# Patient Record
Sex: FEMALE | Race: WHITE | NOT HISPANIC OR LATINO | Employment: FULL TIME | URBAN - NONMETROPOLITAN AREA
[De-identification: names, ages, dates, MRNs, and addresses within clinical notes are randomized per-mention and may not be internally consistent; named-entity substitution may affect disease eponyms.]

---

## 2021-01-11 LAB — PAP RECOMMENDATION EXT: NORMAL

## 2022-08-22 LAB
ABO GROUP: NORMAL
ANTIBODY SCREEN: NEGATIVE
CHLAMYDIA: NEGATIVE
HCG SERPL-ACNC: NORMAL MIU/ML
N. GONORRHOEAE (GC) BY PCR: NEGATIVE
PROGEST SERPL-MCNC: 25.1 NG/ML
RH TYPE: NEGATIVE ML
TRICHOMONAS VAGINALIS: NEGATIVE

## 2022-09-21 LAB
ABO + RH BLD: NORMAL
EXT MATERNIT21: NORMAL
HBV SURFACE AG SERPL QL IA: NEGATIVE
HCT VFR BLD AUTO: 36.3 % (ref 36–46)
HCV AB SERPL QL IA: NEGATIVE
HGB BLD-MCNC: 12.3 G/DL (ref 12–16)
HIV 1+2 AB+HIV1 P24 AG SERPL QL IA: NONREACTIVE
INDIRECT COOMBS: NEGATIVE
MCV RBC AUTO: 88.8 FL (ref 82–108)
PLATELET # BLD AUTO: 230 K/UL (ref 150–399)
RPR: NONREACTIVE
RUBELLA IMMUNE STATUS: NORMAL

## 2022-11-23 LAB
FREE T4: 0.91
HCT VFR BLD AUTO: 35 % (ref 36–46)
HGB BLD-MCNC: 12.1 G/DL (ref 12–16)
MCV RBC AUTO: 88.6 FL (ref 82–108)
PLATELET # BLD AUTO: 230 K/UL (ref 150–399)
TSH SERPL DL<=0.005 MIU/L-ACNC: 0.66 UIU/ML (ref 0.41–5.9)

## 2023-01-04 VITALS — DIASTOLIC BLOOD PRESSURE: 64 MMHG | WEIGHT: 145.5 LBS | SYSTOLIC BLOOD PRESSURE: 118 MMHG

## 2023-01-04 RX ORDER — CITALOPRAM 20 MG/1
20 TABLET, FILM COATED ORAL DAILY
COMMUNITY
End: 2023-08-08 | Stop reason: SDUPTHER

## 2023-01-04 RX ORDER — DOCUSATE SODIUM 100 MG/1
100 CAPSULE, LIQUID FILLED ORAL 2 TIMES DAILY
COMMUNITY
End: 2023-06-06

## 2023-01-04 RX ORDER — ONDANSETRON 4 MG/1
8 TABLET, FILM COATED ORAL EVERY 6 HOURS PRN
COMMUNITY

## 2023-01-04 RX ORDER — CETIRIZINE HYDROCHLORIDE 10 MG/1
10 TABLET ORAL DAILY
COMMUNITY

## 2023-01-04 RX ORDER — HYDROXYZINE PAMOATE 50 MG/1
50 CAPSULE ORAL NIGHTLY PRN
Status: ON HOLD | COMMUNITY
End: 2023-09-21 | Stop reason: SDUPTHER

## 2023-01-04 RX ORDER — BUSPIRONE HYDROCHLORIDE 10 MG/1
10 TABLET ORAL 3 TIMES DAILY PRN
COMMUNITY
End: 2024-02-20 | Stop reason: SDUPTHER

## 2023-01-27 ENCOUNTER — LAB VISIT (OUTPATIENT)
Dept: LAB | Facility: HOSPITAL | Age: 33
End: 2023-01-27
Attending: OBSTETRICS & GYNECOLOGY
Payer: COMMERCIAL

## 2023-01-27 ENCOUNTER — INFUSION (OUTPATIENT)
Dept: INFUSION THERAPY | Facility: HOSPITAL | Age: 33
End: 2023-01-27
Attending: OBSTETRICS & GYNECOLOGY
Payer: COMMERCIAL

## 2023-01-27 DIAGNOSIS — Z3A.28 28 WEEKS GESTATION OF PREGNANCY: Primary | ICD-10-CM

## 2023-01-27 DIAGNOSIS — Z34.90 PREGNANCY, UNSPECIFIED GESTATIONAL AGE: Primary | ICD-10-CM

## 2023-01-27 DIAGNOSIS — Z34.90 PREGNANCY, UNSPECIFIED GESTATIONAL AGE: ICD-10-CM

## 2023-01-27 LAB
ABO AND RH: NORMAL
ANTIBODY SCREEN: NORMAL
BASOPHILS # BLD AUTO: 0.03 X10(3)/MCL (ref 0.01–0.08)
BASOPHILS NFR BLD AUTO: 0.2 % (ref 0.1–1.2)
EOSINOPHIL # BLD AUTO: 0.11 X10(3)/MCL (ref 0.04–0.36)
EOSINOPHIL NFR BLD AUTO: 0.9 % (ref 0.7–7)
ERYTHROCYTE [DISTWIDTH] IN BLOOD BY AUTOMATED COUNT: 12.5 % (ref 11–14.5)
HCT VFR BLD AUTO: 38.8 % (ref 36–48)
HGB BLD-MCNC: 13.2 GM/DL (ref 11.8–16)
IMM GRANULOCYTES # BLD AUTO: 0.1 X10(3)/MCL (ref 0–0.03)
IMM GRANULOCYTES NFR BLD AUTO: 0.8 % (ref 0–0.5)
LYMPHOCYTES # BLD AUTO: 2.06 X10(3)/MCL (ref 1.16–3.74)
LYMPHOCYTES NFR BLD AUTO: 16.7 % (ref 20–55)
MCH RBC QN AUTO: 29.7 PG (ref 27–34)
MCV RBC AUTO: 87.4 FL (ref 79–99)
MEAN CELL HEMOGLOBIN CONCENTRATION (OHS) G/DL: 34 G/DL (ref 31–37)
MONOCYTES # BLD AUTO: 1 X10(3)/MCL (ref 0.24–0.36)
MONOCYTES NFR BLD AUTO: 8.1 % (ref 4.7–12.5)
NEUTROPHILS # BLD AUTO: 9.06 X10(3)/MCL (ref 1.56–6.13)
NEUTROPHILS NFR BLD AUTO: 73.3 % (ref 37–73)
NRBC BLD AUTO-RTO: 0 % (ref 0–1)
PLATELET # BLD AUTO: 209 X10(3)/MCL (ref 140–371)
PMV BLD AUTO: 10.9 FL (ref 9.4–12.4)
RBC # BLD AUTO: 4.44 X10(6)/MCL (ref 4–5.1)
RH IMMUNE GLOBULIN: NORMAL
RH IMMUNE GLOBULIN: NORMAL
WBC # SPEC AUTO: 12.4 X10(3)/MCL (ref 4–11.5)

## 2023-01-27 PROCEDURE — 85025 COMPLETE CBC W/AUTO DIFF WBC: CPT

## 2023-01-27 PROCEDURE — 36415 COLL VENOUS BLD VENIPUNCTURE: CPT

## 2023-01-27 PROCEDURE — 96372 THER/PROPH/DIAG INJ SC/IM: CPT

## 2023-01-27 PROCEDURE — 63600519 RHOGAM PHARM REV CODE 636 ALT 250 W HCPCS: Performed by: OBSTETRICS & GYNECOLOGY

## 2023-01-27 PROCEDURE — 86900 BLOOD TYPING SEROLOGIC ABO: CPT | Performed by: OBSTETRICS & GYNECOLOGY

## 2023-01-27 RX ADMIN — HUMAN RHO(D) IMMUNE GLOBULIN 300 MCG: 50 INJECTION, SOLUTION INTRAMUSCULAR at 04:01

## 2023-01-27 NOTE — PROGRESS NOTES
PT RECEIVED RHOGAM INJECTION IN LEFT DELTOID, PT TOLERATED WELL, INSTRUCTED PATIENT TO CALL MD IF NEEDED

## 2023-02-06 ENCOUNTER — DOCUMENTATION ONLY (OUTPATIENT)
Dept: ADMINISTRATIVE | Facility: HOSPITAL | Age: 33
End: 2023-02-06
Payer: COMMERCIAL

## 2023-02-28 ENCOUNTER — TELEPHONE (OUTPATIENT)
Dept: OBSTETRICS AND GYNECOLOGY | Facility: CLINIC | Age: 33
End: 2023-02-28
Payer: COMMERCIAL

## 2023-02-28 DIAGNOSIS — O35.12X0: Primary | ICD-10-CM

## 2023-02-28 DIAGNOSIS — O26.893 RH NEGATIVE STATE IN ANTEPARTUM PERIOD, THIRD TRIMESTER: ICD-10-CM

## 2023-02-28 DIAGNOSIS — Z67.91 RH NEGATIVE STATE IN ANTEPARTUM PERIOD, THIRD TRIMESTER: ICD-10-CM

## 2023-02-28 DIAGNOSIS — O36.4XX1 FETAL DEMISE, GREATER THAN 22 WEEKS, ANTEPARTUM, FETUS 1: ICD-10-CM

## 2023-02-28 RX ORDER — ONDANSETRON 8 MG/1
8 TABLET, ORALLY DISINTEGRATING ORAL EVERY 6 HOURS PRN
Status: CANCELLED | OUTPATIENT
Start: 2023-03-01

## 2023-02-28 RX ORDER — HYDROMORPHONE HYDROCHLORIDE 2 MG/ML
1 INJECTION, SOLUTION INTRAMUSCULAR; INTRAVENOUS; SUBCUTANEOUS EVERY 6 HOURS PRN
Status: CANCELLED | OUTPATIENT
Start: 2023-03-01

## 2023-02-28 RX ORDER — ACETAMINOPHEN 500 MG
500 TABLET ORAL EVERY 4 HOURS PRN
Status: CANCELLED | OUTPATIENT
Start: 2023-03-01

## 2023-02-28 RX ORDER — MISOPROSTOL 100 MCG
50 TABLET ORAL EVERY 4 HOURS PRN
Status: CANCELLED | OUTPATIENT
Start: 2023-03-01

## 2023-02-28 NOTE — TELEPHONE ENCOUNTER
Returned patient call . Patient currently at [facility] on labor and delivery unit for assessment . Dr. Gibson notified----- Message from Lacy Lejeune, MA sent at 2/28/2023  8:17 AM CST -----  PT IS CURRENTLY PREGNANT AND HAS A NEGATIVE BLOOD TYPE, SHE IS BLEEDING AND WOULD LIKE A NURSE TO CALL HER BACK,

## 2023-03-01 ENCOUNTER — HOSPITAL ENCOUNTER (INPATIENT)
Facility: HOSPITAL | Age: 33
LOS: 1 days | Discharge: HOME | End: 2023-03-02
Attending: OBSTETRICS & GYNECOLOGY | Admitting: OBSTETRICS & GYNECOLOGY
Payer: COMMERCIAL

## 2023-03-01 DIAGNOSIS — O36.4XX1 FETAL DEMISE, GREATER THAN 22 WEEKS, ANTEPARTUM, FETUS 1: ICD-10-CM

## 2023-03-01 DIAGNOSIS — O35.12X0: ICD-10-CM

## 2023-03-01 PROCEDURE — 63600175 PHARM REV CODE 636 W HCPCS: Performed by: OBSTETRICS & GYNECOLOGY

## 2023-03-01 PROCEDURE — 59400 PR FULL ROUT OBSTE CARE,VAGINAL DELIV: ICD-10-PCS | Mod: AT,,, | Performed by: OBSTETRICS & GYNECOLOGY

## 2023-03-01 PROCEDURE — 72200005 HC VAGINAL DELIVERY LEVEL II

## 2023-03-01 PROCEDURE — 36415 COLL VENOUS BLD VENIPUNCTURE: CPT | Performed by: OBSTETRICS & GYNECOLOGY

## 2023-03-01 PROCEDURE — 85461 HEMOGLOBIN FETAL: CPT | Performed by: OBSTETRICS & GYNECOLOGY

## 2023-03-01 PROCEDURE — 25000003 PHARM REV CODE 250: Performed by: OBSTETRICS & GYNECOLOGY

## 2023-03-01 PROCEDURE — 59400 OBSTETRICAL CARE: CPT | Mod: AT,,, | Performed by: OBSTETRICS & GYNECOLOGY

## 2023-03-01 PROCEDURE — 11000001 HC ACUTE MED/SURG PRIVATE ROOM

## 2023-03-01 RX ORDER — OXYTOCIN/RINGER'S LACTATE 30/500 ML
334 PLASTIC BAG, INJECTION (ML) INTRAVENOUS ONCE AS NEEDED
Status: COMPLETED | OUTPATIENT
Start: 2023-03-01 | End: 2023-03-01

## 2023-03-01 RX ORDER — MISOPROSTOL 100 UG/1
800 TABLET ORAL ONCE AS NEEDED
Status: DISCONTINUED | OUTPATIENT
Start: 2023-03-01 | End: 2023-03-02 | Stop reason: HOSPADM

## 2023-03-01 RX ORDER — OXYTOCIN/RINGER'S LACTATE 30/500 ML
PLASTIC BAG, INJECTION (ML) INTRAVENOUS
Status: COMPLETED
Start: 2023-03-01 | End: 2023-03-01

## 2023-03-01 RX ORDER — METHYLERGONOVINE MALEATE 0.2 MG/ML
200 INJECTION INTRAVENOUS
Status: DISCONTINUED | OUTPATIENT
Start: 2023-03-01 | End: 2023-03-02 | Stop reason: HOSPADM

## 2023-03-01 RX ORDER — SODIUM CHLORIDE 0.9 % (FLUSH) 0.9 %
10 SYRINGE (ML) INJECTION
Status: DISCONTINUED | OUTPATIENT
Start: 2023-03-01 | End: 2023-03-02 | Stop reason: HOSPADM

## 2023-03-01 RX ORDER — ACETAMINOPHEN 500 MG
500 TABLET ORAL EVERY 4 HOURS PRN
Status: DISCONTINUED | OUTPATIENT
Start: 2023-03-01 | End: 2023-03-02 | Stop reason: HOSPADM

## 2023-03-01 RX ORDER — MISOPROSTOL 100 MCG
50 TABLET ORAL EVERY 4 HOURS PRN
Status: DISCONTINUED | OUTPATIENT
Start: 2023-03-01 | End: 2023-03-01

## 2023-03-01 RX ORDER — OXYTOCIN 10 [USP'U]/ML
10 INJECTION, SOLUTION INTRAMUSCULAR; INTRAVENOUS ONCE AS NEEDED
Status: DISCONTINUED | OUTPATIENT
Start: 2023-03-01 | End: 2023-03-02 | Stop reason: HOSPADM

## 2023-03-01 RX ORDER — HYDROMORPHONE HYDROCHLORIDE 1 MG/ML
1 INJECTION, SOLUTION INTRAMUSCULAR; INTRAVENOUS; SUBCUTANEOUS EVERY 4 HOURS PRN
Status: DISCONTINUED | OUTPATIENT
Start: 2023-03-01 | End: 2023-03-02 | Stop reason: HOSPADM

## 2023-03-01 RX ORDER — TRANEXAMIC ACID 10 MG/ML
1000 INJECTION, SOLUTION INTRAVENOUS ONCE AS NEEDED
Status: DISCONTINUED | OUTPATIENT
Start: 2023-03-01 | End: 2023-03-02 | Stop reason: HOSPADM

## 2023-03-01 RX ORDER — CARBOPROST TROMETHAMINE 250 UG/ML
250 INJECTION, SOLUTION INTRAMUSCULAR
Status: DISCONTINUED | OUTPATIENT
Start: 2023-03-01 | End: 2023-03-02 | Stop reason: HOSPADM

## 2023-03-01 RX ORDER — PROCHLORPERAZINE EDISYLATE 5 MG/ML
5 INJECTION INTRAMUSCULAR; INTRAVENOUS EVERY 6 HOURS PRN
Status: DISCONTINUED | OUTPATIENT
Start: 2023-03-01 | End: 2023-03-02 | Stop reason: HOSPADM

## 2023-03-01 RX ORDER — ONDANSETRON 4 MG/1
8 TABLET, ORALLY DISINTEGRATING ORAL EVERY 8 HOURS PRN
Status: DISCONTINUED | OUTPATIENT
Start: 2023-03-01 | End: 2023-03-02 | Stop reason: HOSPADM

## 2023-03-01 RX ORDER — ONDANSETRON 4 MG/1
8 TABLET, ORALLY DISINTEGRATING ORAL EVERY 6 HOURS PRN
Status: DISCONTINUED | OUTPATIENT
Start: 2023-03-01 | End: 2023-03-02 | Stop reason: HOSPADM

## 2023-03-01 RX ORDER — OXYTOCIN/RINGER'S LACTATE 30/500 ML
95 PLASTIC BAG, INJECTION (ML) INTRAVENOUS ONCE AS NEEDED
Status: DISCONTINUED | OUTPATIENT
Start: 2023-03-01 | End: 2023-03-02 | Stop reason: HOSPADM

## 2023-03-01 RX ORDER — MISOPROSTOL 100 UG/1
100 TABLET ORAL EVERY 4 HOURS PRN
Status: DISCONTINUED | OUTPATIENT
Start: 2023-03-01 | End: 2023-03-02 | Stop reason: HOSPADM

## 2023-03-01 RX ORDER — HYDROMORPHONE HYDROCHLORIDE 1 MG/ML
1 INJECTION, SOLUTION INTRAMUSCULAR; INTRAVENOUS; SUBCUTANEOUS EVERY 6 HOURS PRN
Status: DISCONTINUED | OUTPATIENT
Start: 2023-03-01 | End: 2023-03-01

## 2023-03-01 RX ADMIN — MISOPROSTOL 50 MCG: 100 TABLET ORAL at 06:03

## 2023-03-01 RX ADMIN — MISOPROSTOL 100 MCG: 100 TABLET ORAL at 07:03

## 2023-03-01 RX ADMIN — Medication 334 MILLI-UNITS/MIN: at 10:03

## 2023-03-01 RX ADMIN — ACETAMINOPHEN 500 MG: 500 TABLET, FILM COATED ORAL at 09:03

## 2023-03-01 RX ADMIN — HYDROMORPHONE HYDROCHLORIDE 1 MG: 1 INJECTION, SOLUTION INTRAMUSCULAR; INTRAVENOUS; SUBCUTANEOUS at 05:03

## 2023-03-01 RX ADMIN — MISOPROSTOL 50 MCG: 100 TABLET ORAL at 10:03

## 2023-03-01 RX ADMIN — MISOPROSTOL 50 MCG: 100 TABLET ORAL at 02:03

## 2023-03-01 RX ADMIN — HYDROMORPHONE HYDROCHLORIDE 1 MG: 1 INJECTION, SOLUTION INTRAMUSCULAR; INTRAVENOUS; SUBCUTANEOUS at 09:03

## 2023-03-01 RX ADMIN — ACETAMINOPHEN 500 MG: 500 TABLET, FILM COATED ORAL at 02:03

## 2023-03-02 VITALS
TEMPERATURE: 98 F | DIASTOLIC BLOOD PRESSURE: 84 MMHG | SYSTOLIC BLOOD PRESSURE: 134 MMHG | RESPIRATION RATE: 16 BRPM | BODY MASS INDEX: 24.84 KG/M2 | HEIGHT: 64 IN | HEART RATE: 62 BPM | OXYGEN SATURATION: 99 % | WEIGHT: 145.5 LBS

## 2023-03-02 LAB
BASOPHILS # BLD AUTO: 0.03 X10(3)/MCL (ref 0.01–0.08)
BASOPHILS NFR BLD AUTO: 0.2 % (ref 0.1–1.2)
EOSINOPHIL # BLD AUTO: 0.09 X10(3)/MCL (ref 0.04–0.36)
EOSINOPHIL NFR BLD AUTO: 0.7 % (ref 0.7–7)
ERYTHROCYTE [DISTWIDTH] IN BLOOD BY AUTOMATED COUNT: 13.4 % (ref 11–14.5)
HCT VFR BLD AUTO: 37.3 % (ref 36–48)
HGB BLD-MCNC: 12.7 G/DL (ref 11.8–16)
IMM GRANULOCYTES # BLD AUTO: 0.09 X10(3)/MCL (ref 0–0.03)
IMM GRANULOCYTES NFR BLD AUTO: 0.7 % (ref 0–0.5)
LYMPHOCYTES # BLD AUTO: 1.81 X10(3)/MCL (ref 1.16–3.74)
LYMPHOCYTES NFR BLD AUTO: 13.5 % (ref 20–55)
MCH RBC QN AUTO: 29.9 PG (ref 27–34)
MCV RBC AUTO: 87.8 FL (ref 79–99)
MEAN CELL HEMOGLOBIN CONCENTRATION (OHS) G/DL: 34 G/DL (ref 31–37)
MONOCYTES # BLD AUTO: 1.2 X10(3)/MCL (ref 0.24–0.36)
MONOCYTES NFR BLD AUTO: 8.9 % (ref 4.7–12.5)
NEUTROPHILS # BLD AUTO: 10.23 X10(3)/MCL (ref 1.56–6.13)
NEUTROPHILS NFR BLD AUTO: 76 % (ref 37–73)
NRBC BLD AUTO-RTO: 0 % (ref 0–1)
PLATELET # BLD AUTO: 217 X10(3)/MCL (ref 140–371)
PMV BLD AUTO: 11.6 FL (ref 9.4–12.4)
RBC # BLD AUTO: 4.25 X10(6)/MCL (ref 4–5.1)
RH IMMUNE GLOBULIN: NORMAL
ROSETTE - FMH (FETAL BLEED SCREEN): NORMAL
WBC # SPEC AUTO: 13.5 X10(3)/MCL (ref 4–11.5)

## 2023-03-02 PROCEDURE — 25000003 PHARM REV CODE 250: Performed by: OBSTETRICS & GYNECOLOGY

## 2023-03-02 PROCEDURE — 63600519 RHOGAM PHARM REV CODE 636 ALT 250 W HCPCS: Performed by: OBSTETRICS & GYNECOLOGY

## 2023-03-02 PROCEDURE — 96374 THER/PROPH/DIAG INJ IV PUSH: CPT

## 2023-03-02 PROCEDURE — 96360 HYDRATION IV INFUSION INIT: CPT

## 2023-03-02 PROCEDURE — 72100003 HC LABOR CARE, EA. ADDL. 8 HRS

## 2023-03-02 PROCEDURE — 85025 COMPLETE CBC W/AUTO DIFF WBC: CPT | Performed by: OBSTETRICS & GYNECOLOGY

## 2023-03-02 PROCEDURE — 36415 COLL VENOUS BLD VENIPUNCTURE: CPT | Performed by: OBSTETRICS & GYNECOLOGY

## 2023-03-02 PROCEDURE — 72100002 HC LABOR CARE, 1ST 8 HOURS

## 2023-03-02 RX ORDER — DIPHENHYDRAMINE HCL 25 MG
25 CAPSULE ORAL EVERY 4 HOURS PRN
Status: DISCONTINUED | OUTPATIENT
Start: 2023-03-02 | End: 2023-03-02 | Stop reason: HOSPADM

## 2023-03-02 RX ORDER — IBUPROFEN 600 MG/1
600 TABLET ORAL EVERY 6 HOURS PRN
Status: DISCONTINUED | OUTPATIENT
Start: 2023-03-02 | End: 2023-03-02 | Stop reason: HOSPADM

## 2023-03-02 RX ORDER — HYDROCODONE BITARTRATE AND ACETAMINOPHEN 10; 325 MG/1; MG/1
1 TABLET ORAL EVERY 4 HOURS PRN
Status: DISCONTINUED | OUTPATIENT
Start: 2023-03-02 | End: 2023-03-02 | Stop reason: HOSPADM

## 2023-03-02 RX ORDER — HYDROCORTISONE 25 MG/G
CREAM TOPICAL 3 TIMES DAILY PRN
Status: DISCONTINUED | OUTPATIENT
Start: 2023-03-02 | End: 2023-03-02 | Stop reason: HOSPADM

## 2023-03-02 RX ORDER — DIPHENHYDRAMINE HYDROCHLORIDE 50 MG/ML
25 INJECTION INTRAMUSCULAR; INTRAVENOUS EVERY 4 HOURS PRN
Status: DISCONTINUED | OUTPATIENT
Start: 2023-03-02 | End: 2023-03-02 | Stop reason: HOSPADM

## 2023-03-02 RX ORDER — DOCUSATE SODIUM 100 MG/1
200 CAPSULE, LIQUID FILLED ORAL 2 TIMES DAILY PRN
Status: DISCONTINUED | OUTPATIENT
Start: 2023-03-02 | End: 2023-03-02 | Stop reason: HOSPADM

## 2023-03-02 RX ORDER — KETOROLAC TROMETHAMINE 10 MG/1
10 TABLET, FILM COATED ORAL EVERY 6 HOURS PRN
Qty: 12 TABLET | Refills: 0 | Status: SHIPPED | OUTPATIENT
Start: 2023-03-02 | End: 2023-06-06

## 2023-03-02 RX ORDER — ACETAMINOPHEN 325 MG/1
650 TABLET ORAL EVERY 6 HOURS PRN
Status: DISCONTINUED | OUTPATIENT
Start: 2023-03-02 | End: 2023-03-02 | Stop reason: HOSPADM

## 2023-03-02 RX ORDER — HYDROCODONE BITARTRATE AND ACETAMINOPHEN 5; 325 MG/1; MG/1
1 TABLET ORAL EVERY 4 HOURS PRN
Qty: 12 TABLET | Refills: 0 | Status: SHIPPED | OUTPATIENT
Start: 2023-03-02 | End: 2023-06-06

## 2023-03-02 RX ORDER — SIMETHICONE 80 MG
1 TABLET,CHEWABLE ORAL EVERY 6 HOURS PRN
Status: DISCONTINUED | OUTPATIENT
Start: 2023-03-02 | End: 2023-03-02 | Stop reason: HOSPADM

## 2023-03-02 RX ORDER — PRENATAL WITH FERROUS FUM AND FOLIC ACID 3080; 920; 120; 400; 22; 1.84; 3; 20; 10; 1; 12; 200; 27; 25; 2 [IU]/1; [IU]/1; MG/1; [IU]/1; MG/1; MG/1; MG/1; MG/1; MG/1; MG/1; UG/1; MG/1; MG/1; MG/1; MG/1
1 TABLET ORAL DAILY
Status: DISCONTINUED | OUTPATIENT
Start: 2023-03-02 | End: 2023-03-02 | Stop reason: HOSPADM

## 2023-03-02 RX ORDER — HYDROCODONE BITARTRATE AND ACETAMINOPHEN 5; 325 MG/1; MG/1
1 TABLET ORAL EVERY 4 HOURS PRN
Status: DISCONTINUED | OUTPATIENT
Start: 2023-03-02 | End: 2023-03-02 | Stop reason: HOSPADM

## 2023-03-02 RX ORDER — OXYTOCIN/RINGER'S LACTATE 30/500 ML
95 PLASTIC BAG, INJECTION (ML) INTRAVENOUS ONCE
Status: DISCONTINUED | OUTPATIENT
Start: 2023-03-02 | End: 2023-03-02 | Stop reason: HOSPADM

## 2023-03-02 RX ADMIN — IBUPROFEN 600 MG: 600 TABLET, FILM COATED ORAL at 02:03

## 2023-03-02 RX ADMIN — HUMAN RHO(D) IMMUNE GLOBULIN 300 MCG: 300 INJECTION, SOLUTION INTRAMUSCULAR at 11:03

## 2023-03-02 RX ADMIN — BENZOCAINE AND LEVOMENTHOL: 200; 5 SPRAY TOPICAL at 09:03

## 2023-03-02 NOTE — PLAN OF CARE
03/02/23 0955   Discharge Planning   Assessment Type Discharge Planning Assessment   Resource/Environmental Concerns none   Support Systems Spouse/significant other;Parent   Assistance Needed none   Equipment Currently Used at Home none   Current Living Arrangements home   Patient/Family Anticipates Transition to home   Patient/Family Anticipated Services at Transition community agency  ([facility])   DME Needed Upon Discharge  none   Discharge Plan A Home with family   Discharge Plan B Home with family   Physical Activity   On average, how many days per week do you engage in moderate to strenuous exercise (like a brisk walk)? 0 days   On average, how many minutes do you engage in exercise at this level? 0 min   Financial Resource Strain   How hard is it for you to pay for the very basics like food, housing, medical care, and heating? Not hard   Housing Stability   In the last 12 months, was there a time when you were not able to pay the mortgage or rent on time? N   In the last 12 months, how many places have you lived? 1   In the last 12 months, was there a time when you did not have a steady place to sleep or slept in a shelter (including now)? N   Transportation Needs   In the past 12 months, has lack of transportation kept you from medical appointments or from getting medications? no   In the past 12 months, has lack of transportation kept you from meetings, work, or from getting things needed for daily living? No   Food Insecurity   Within the past 12 months, you worried that your food would run out before you got the money to buy more. Never true   Within the past 12 months, the food you bought just didn't last and you didn't have money to get more. Never true   Stress   Do you feel stress - tense, restless, nervous, or anxious, or unable to sleep at night because your mind is troubled all the time - these days? To some exte   Social Connections   In a typical week, how many times do you talk on  the phone with family, friends, or neighbors? More than 3   How often do you get together with friends or relatives? More than 3   Do you belong to any clubs or organizations such as Druze groups, unions, fraternal or athletic groups, or school groups? No   How often do you attend meetings of the clubs or organizations you belong to? Never   Are you , , , , never , or living with a partner?    Alcohol Use   Q1: How often do you have a drink containing alcohol? Never   Q2: How many drinks containing alcohol do you have on a typical day when you are drinking? None   Q3: How often do you have six or more drinks on one occasion? Never

## 2023-03-02 NOTE — H&P
[facility]  History & Physical    Patient Name: Rocky Burch  MRN: [id number]  Admission Date: 3/1/2023  Primary Care Provider: Primary Doctor No    Subjective:     Principal Problem: INTRA-UTERINE FETAL DEMISE AT 33W2D    History of Present Illness:      AT 33W2D WITH FETAL DEMISE DIAGNOSED YESTERDAY ON THE LABOR UNIT. SHE INITIALLY PRESENTED WITH SPOTTING AND DECREASED FETAL MOVEMENTS. PATIENT'S FETUS IS KNOWN TO HAVE TRISOMY 18. SHE WAS EVALUATED WITH ULTRASOUND, THE FETAL HEART TONES WERE IN THE 30'S. FETUS WAS VERTEX WITH DIANE OF 7CM. LAB EVALUATION WAS NORMAL, COAGS WERE NORMAL. SHORTLY AFTER THE ULTRASOUND, FETAL HEART TONES COULD NOT AUSCULTATED, ULTRASOUND WAS PERFORMED AGAIN AND VERIFIED THE DEMISE. THE PATIENT DESIRED TO BE DISCHARGED HOME AFTER AND PRESENT TO TODAY FOR INDUCTION.     This pregnancy has been complicated by FETAL TRISOMY 18 AND MATERNAL RH NEGATIVE STATUS    OB History    Para Term  AB Living   1 0 0 0 0 0   SAB IAB Ectopic Multiple Live Births   0 0 0 0 0      # Outcome Date GA Lbr José Miguel/2nd Weight Sex Delivery Anes PTL Lv   1 Current              History reviewed. No pertinent past medical history.  Past Surgical History:   Procedure Laterality Date    DENTAL SURGERY         PTA Medications   Medication Sig    busPIRone (BUSPAR) 10 MG tablet Take 10 mg by mouth 3 (three) times daily as needed.    cetirizine (ZYRTEC) 10 MG tablet Take 10 mg by mouth once daily.    citalopram (CELEXA) 20 MG tablet Take 20 mg by mouth once daily.    docusate sodium (COLACE) 100 MG capsule Take 100 mg by mouth 2 (two) times daily.    hydrOXYzine pamoate (VISTARIL) 50 MG Cap Take 50 mg by mouth nightly as needed.    ondansetron (ZOFRAN) 4 MG tablet Take 8 mg by mouth every 6 (six) hours as needed for Nausea.    prenatal vit no.124/iron/folic (PRENATAL VITAMIN ORAL) Take 1 tablet by mouth once daily.       Review of patient's allergies indicates:  No Known  Allergies     Family History       Problem Relation (Age of Onset)    Depression Father    Diabetes type II Mother    Gestational diabetes Mother    Hypertension Father          Tobacco Use    Smoking status: Never    Smokeless tobacco: Never   Substance and Sexual Activity    Alcohol use: Not Currently    Drug use: Never    Sexual activity: Yes     Review of Systems   Objective:     Vital Signs (Most Recent):  Temp: 99.4 °F (37.4 °C) (23 1637)  Pulse: 83 (23 1802)  Resp: 18 (23 1745)  BP: 131/64 (23 1802)  SpO2: 99 % (23 0930) Vital Signs (24h Range):  Temp:  [97.5 °F (36.4 °C)-99.4 °F (37.4 °C)] 99.4 °F (37.4 °C)  Pulse:  [74-86] 83  Resp:  [18-20] 18  SpO2:  [99 %] 99 %  BP: (122-131)/(63-78) 131/64     Weight: 66 kg (145 lb 8.1 oz)  Body mass index is 24.98 kg/m².    Physical Exam    Cervix:  Dilation:  0  Effacement:  0%  Station: -3  Presentation: Vertex     Significant Labs:  Lab Results   Component Value Date    GROUPTRH O NEG 2022    HEPBSAG Negative 2022       Assessment/Plan:     32 y.o. female  at 33w2d for:    Active Diagnoses:    Diagnosis Date Noted POA    Maternal care for (suspected) chromosomal abnormality in fetus, trisomy 18, not applicable or unspecified [O35.12X0] 2023 Yes    Fetal demise, greater than 22 weeks, antepartum, fetus 1 [O36.4XX1] 2023 Yes    Rh negative state in antepartum period, third trimester [O26.893, Z67.91] 2023 Yes      Problems Resolved During this Admission:     PLAN FOR CYTOTEC INDUCTION, CONSENTS SIGNED      SURAJ COWAN MD  Obstetrics  [facility]

## 2023-03-02 NOTE — PLAN OF CARE
PT DELIVERED STILLBORN INFANT, DISPLAYING POSITIVE GRIEVING, FAMILY SUPPORT THROUGHOUT THE NIGHT.

## 2023-03-02 NOTE — L&D DELIVERY NOTE
"[facility]  Vaginal Delivery   Operative Note    SUMMARY     Normal spontaneous vaginal delivery of demised infant, infant placed on mother's abdomen.  Infant delivered position ARUNA over intact perineum.  Nuchal cord: No.  Spontaneous delivery of placenta with 25% placental abruption noted and IV pitocin given noting good uterine tone.  1st degree laceration noted, right periurethral and vaginal introitus.  Patient tolerated delivery well.   Sponge needle and lap counted correctly x2.    Indications: Cytotec induction for fetal demise    Pregnancy complicated by:   Patient Active Problem List   Diagnosis    Maternal care for (suspected) chromosomal abnormality in fetus, trisomy 18, not applicable or unspecified    Fetal demise, greater than 22 weeks, antepartum, fetus 1    Rh negative state in antepartum period, third trimester    Spontaneous vaginal delivery     Admitting GA: 33w2d    Delivery Information for Unknown Rocky Burch    Birth information:  YOB: 2021   Time of birth: 9:58 PM   Sex: female   Head Delivery Date/Time: 3/1/2023  9:58 PM   Delivery type: Vaginal, Spontaneous   Gestational Age: 33w2d    Delivery Providers    Delivering clinician: Fidel Gibson MD           Measurements    Weight: 959 g  Weight (lbs): 2 lb 1.8 oz  Length: 33 cm  Length (in): 13"  Head circumference: 22.9 cm         Apgars    Living status: Fetal Demise  Apgars:  1 min.:  5 min.:  10 min.:  15 min.:  20 min.:    Skin color:  0  0  0  0  0    Heart rate:  0  0  0  0  0    Reflex irritability:  0  0  0  0  0    Muscle tone:  0  0  0  0  0    Respiratory effort:  0  0  0  0  0    Total:  0  0  0  0  0             Operative Delivery    Forceps attempted?: No  Vacuum extractor attempted?: No         Shoulder Dystocia    Shoulder dystocia present?: No           Presentation    Presentation: Vertex  Position: Left Occiput Anterior           Interventions/Resuscitation    Method: None   "     Cord    Vessels: 3 vessels  Complications: None  Delayed Cord Clamping?: No  Cord Clamped Date/Time: 3/1/2023  9:58 PM  Cord Blood Disposition: Lab  Gases Sent?: No  Stem Cell Collection (by MD): No       Placenta    Placenta delivery date/time: 3/1/2023 2204  Placenta removal: Spontaneous  Placenta appearance: Intact  Placenta disposition: Pathology           Labor Events:       labor: No     Labor Onset Date/Time: 2023 19:15     Dilation Complete Date/Time: 2023 21:40     Start Pushing Date/Time: 2023 21:55     Rupture Date/Time: 23         Rupture type: ARM (Artificial Rupture)           Fluid Amount:         Fluid Color: Green, Meconium Moderate         steroids: None     Antibiotics given for GBS: No     Induction: misoprostol     Indications for induction:  Fetal Demise     Augmentation:       Indications for augmentation:       Labor complications: None     Additional complications:          Cervical ripening:                     Delivery:      Episiotomy: None     Indication for Episiotomy:       Perineal Lacerations: None Repaired:      Periurethral Laceration:   Repaired:     Labial Laceration:   Repaired:     Sulcus Laceration:   Repaired:     Vaginal Laceration:   Repaired:     Cervical Laceration:   Repaired:     Repair suture: None     Repair # of packets:       Last Value - EBL - Nursing (mL):       Sum - EBL - Nursing (mL): 0     Last Value - EBL - Anesthesia (mL):        Calculated QBL (mL):         Vaginal Sweep Performed:       Surgicount Correct:         Other providers:       Anesthesia    Method: None

## 2023-03-02 NOTE — DISCHARGE SUMMARY
[facility]  Obstetrics  Discharge Summary      Patient Name: Rocky Burch  MRN: [id number]  Admission Date: 3/1/2023  Hospital Length of Stay: 1 days  Discharge Date and Time: 3/2/2023  1:09 PM  Attending Physician: Suraj Gibson MD   Discharging Provider: SURAJ GIBSON MD  Primary Care Provider: Primary Doctor No    HPI: G1 NOW P1, DELIVERED OF FEMALE FETAL DEMISE 3/1/23    * No surgery found *     Hospital Course: PATIENT HAS DONE WELL SINCE DELIVERY, AMBULATING, VOIDING WITHOUT DIFFICULTY, PAIN WELL CONTROLLED WITH ORAL PAIN MEDS. SHE HAS RESUMED HER ANXIETY MEDICATIONS.     Consults (From admission, onward)          Status Ordering Provider     Inpatient consult to Social Work/Case Management  Once        Provider:  (Not yet assigned)    SURAJ White            Final Active Diagnoses:    Diagnosis Date Noted POA    Spontaneous vaginal delivery [O80] 03/01/2023 Not Applicable    Maternal care for (suspected) chromosomal abnormality in fetus, trisomy 18, not applicable or unspecified [O35.12X0] 02/28/2023 Yes    Fetal demise, greater than 22 weeks, antepartum, fetus 1 [O36.4XX1] 02/28/2023 Yes    Rh negative state in antepartum period, third trimester [O26.893, Z67.91] 02/28/2023 Yes      Problems Resolved During this Admission:        Labs: CBC   Recent Labs   Lab 02/28/23  1054 03/02/23  0603   WBC 10.9 13.5*   HGB 14.0 12.7   HCT 41.0 37.3    217       Immunizations       Date Immunization Status Dose Route/Site Given by    03/01/23 2222 Rho (D) Immune Globulin - IM Incomplete 300 mcg Intramuscular/             Delivery:    Episiotomy: None   Lacerations: None   Repair suture: None   Repair # of packets:     Blood loss (ml):       Birth information:  YOB: 2021   Time of birth: 9:58 PM   Sex: female   Delivery type: Vaginal, Spontaneous   Gestational Age: 33w2d    Delivery Clinician:  SURAJ GIBSON MD         Living?: NO          APGARS  One  minute Five minutes Ten minutes   Skin color:         Heart rate:         Grimace:         Muscle tone:         Breathing:         Totals: 0  0  0      Placenta: Delivered:  SPONTANEOUSLY  appearance: SMALL WITH PARTIAL ABRUPTION (25%)  Pending Diagnostic Studies:       Procedure Component Value Units Date/Time    Specimen to Pathology Gynecology and Obstetrics [[id number]] Collected: 03/02/23 0108    Order Status: Sent Lab Status: No result     Specimen: Tissue             Discharged Condition: good    Disposition:     Follow Up:   Follow-up Information       SURAJ COWAN MD Follow up in 4 week(s).    Specialty: Obstetrics and Gynecology  Contact information:  [address] [place] [id number]  [phone]                           Patient Instructions:   No discharge procedures on file.  Medications:  Current Discharge Medication List        START taking these medications    Details   HYDROcodone-acetaminophen (NORCO) 5-325 mg per tablet Take 1 tablet by mouth every 4 (four) hours as needed for Pain.  Qty: 12 tablet, Refills: 0    Comments: Quantity prescribed more than 7 day supply? No      ketorolac (TORADOL) 10 mg tablet Take 1 tablet (10 mg total) by mouth every 6 (six) hours as needed for Pain.  Qty: 12 tablet, Refills: 0           CONTINUE these medications which have NOT CHANGED    Details   busPIRone (BUSPAR) 10 MG tablet Take 10 mg by mouth 3 (three) times daily as needed.      cetirizine (ZYRTEC) 10 MG tablet Take 10 mg by mouth once daily.      citalopram (CELEXA) 20 MG tablet Take 20 mg by mouth once daily.      docusate sodium (COLACE) 100 MG capsule Take 100 mg by mouth 2 (two) times daily.      hydrOXYzine pamoate (VISTARIL) 50 MG Cap Take 50 mg by mouth nightly as needed.      ondansetron (ZOFRAN) 4 MG tablet Take 8 mg by mouth every 6 (six) hours as needed for Nausea.      prenatal vit no.124/iron/folic (PRENATAL VITAMIN ORAL) Take 1 tablet by mouth once daily.             SURAJ COWAN  MD  Obstetrics  [facility]

## 2023-03-02 NOTE — PLAN OF CARE
received consult for fetal demise. Spoke with patient  family regarding [facility]. They have already been in contact with [facility] and on line application has been completed. They have chosen [facility] who will be contacted when family is ready to release infant. Patient and family state they do not have any other needs at this time

## 2023-03-13 ENCOUNTER — DOCUMENTATION ONLY (OUTPATIENT)
Dept: OBSTETRICS AND GYNECOLOGY | Facility: CLINIC | Age: 33
End: 2023-03-13
Payer: COMMERCIAL

## 2023-03-30 ENCOUNTER — POSTPARTUM VISIT (OUTPATIENT)
Dept: OBSTETRICS AND GYNECOLOGY | Facility: CLINIC | Age: 33
End: 2023-03-30
Payer: COMMERCIAL

## 2023-03-30 ENCOUNTER — DOCUMENTATION ONLY (OUTPATIENT)
Dept: OBSTETRICS AND GYNECOLOGY | Facility: CLINIC | Age: 33
End: 2023-03-30

## 2023-03-30 VITALS
HEART RATE: 74 BPM | WEIGHT: 152.31 LBS | DIASTOLIC BLOOD PRESSURE: 68 MMHG | OXYGEN SATURATION: 99 % | SYSTOLIC BLOOD PRESSURE: 108 MMHG | BODY MASS INDEX: 29.9 KG/M2 | HEIGHT: 60 IN

## 2023-03-30 PROCEDURE — 0503F POSTPARTUM CARE VISIT: CPT | Mod: CPTII,,, | Performed by: OBSTETRICS & GYNECOLOGY

## 2023-03-30 PROCEDURE — 0503F PR POSTPARTUM CARE VISIT: ICD-10-PCS | Mod: CPTII,,, | Performed by: OBSTETRICS & GYNECOLOGY

## 2023-03-30 NOTE — PROGRESS NOTES
Subjective:      Rocky Burch is a 32 y.o.  who presents for a postpartum visit.  She is status post  uncomplicated vaginal delivery 4 weeks ago.  Her hospitalization was not complicated. She desires no method for contraception.  She denies signs and symptoms of postpartum depression. Her last pap was on 23. PATIENT STATES NO NEW CONCERNS FOR TODAY.      Past Medical History:   Diagnosis Date    Anxiety disorder, unspecified      Past Surgical History:   Procedure Laterality Date    DENTAL SURGERY       Social History     Tobacco Use    Smoking status: Never    Smokeless tobacco: Never   Substance Use Topics    Alcohol use: Not Currently    Drug use: Never     Family History   Problem Relation Age of Onset    Depression Father     Hypertension Father     Diabetes type II Mother     Gestational diabetes Mother      OB History    Para Term  AB Living   1 1   1       SAB IAB Ectopic Multiple Live Births         0        # Outcome Date GA Lbr José Miguel/2nd Weight Sex Delivery Anes PTL Lv   1  23 33w2d 02:25 / 00:18 0.959 kg (2 lb 1.8 oz) F Vag-Spont None N FD        Review the Delivery Report for details.     ROS:   Review of Systems  A comprehensive review of symptoms was completed and negative except as noted above.      Objective:     /68 (BP Location: Left arm)   Pulse 74   Ht 5' (1.524 m)   Wt 69.1 kg (152 lb 4.8 oz)   LMP 2022   SpO2 99%   Breastfeeding No   BMI 29.74 kg/m²   Constitutional:  General Appearance : alert, in no acute distress, normal, well nourished.  Genitourinary:  External Genitalia: normal, no lesions.  Vagina: normal appearance, no abnormal discharge, no lesions.  Bladder: no mass, nontender.  Urethra: no erythema or lesions present.  Cervix: no lesions, non tender. Pap  PATIENT DUE MAY 30, 2023  (HAD LAST PAP DONE BY DR. ALEXANDRE DICKSON @ [facility] IN [place].)   Uterus: nontender, normal contour, normal mobility, normal  size.   Adnexa: no masses, no tenderness.  Anus and Perineum: visually normal.   Chaperone Present  Assessment:     Postpartum exam        Plan:     Return to clinic For annual exam. In addition, the patient has also been instructed to follow up on as needed basis. All questions were answered and the patient voiced understanding of the above issues.

## 2023-04-03 ENCOUNTER — DOCUMENTATION ONLY (OUTPATIENT)
Dept: OBSTETRICS AND GYNECOLOGY | Facility: CLINIC | Age: 33
End: 2023-04-03
Payer: COMMERCIAL

## 2023-04-03 LAB — PAP SMEAR: NORMAL

## 2023-06-06 ENCOUNTER — OFFICE VISIT (OUTPATIENT)
Dept: OBSTETRICS AND GYNECOLOGY | Facility: CLINIC | Age: 33
End: 2023-06-06
Payer: COMMERCIAL

## 2023-06-06 VITALS
SYSTOLIC BLOOD PRESSURE: 118 MMHG | HEART RATE: 90 BPM | HEIGHT: 60 IN | BODY MASS INDEX: 29.58 KG/M2 | WEIGHT: 150.69 LBS | DIASTOLIC BLOOD PRESSURE: 60 MMHG

## 2023-06-06 DIAGNOSIS — Z01.419 ROUTINE GYNECOLOGICAL EXAMINATION: Primary | ICD-10-CM

## 2023-06-06 DIAGNOSIS — N97.0 ANOVULATION: ICD-10-CM

## 2023-06-06 PROCEDURE — 3078F PR MOST RECENT DIASTOLIC BLOOD PRESSURE < 80 MM HG: ICD-10-PCS | Mod: CPTII,,, | Performed by: OBSTETRICS & GYNECOLOGY

## 2023-06-06 PROCEDURE — 1159F MED LIST DOCD IN RCRD: CPT | Mod: CPTII,,, | Performed by: OBSTETRICS & GYNECOLOGY

## 2023-06-06 PROCEDURE — 3008F PR BODY MASS INDEX (BMI) DOCUMENTED: ICD-10-PCS | Mod: CPTII,,, | Performed by: OBSTETRICS & GYNECOLOGY

## 2023-06-06 PROCEDURE — 3008F BODY MASS INDEX DOCD: CPT | Mod: CPTII,,, | Performed by: OBSTETRICS & GYNECOLOGY

## 2023-06-06 PROCEDURE — 99395 PR PREVENTIVE VISIT,EST,18-39: ICD-10-PCS | Mod: ,,, | Performed by: OBSTETRICS & GYNECOLOGY

## 2023-06-06 PROCEDURE — 1160F RVW MEDS BY RX/DR IN RCRD: CPT | Mod: CPTII,,, | Performed by: OBSTETRICS & GYNECOLOGY

## 2023-06-06 PROCEDURE — 3074F PR MOST RECENT SYSTOLIC BLOOD PRESSURE < 130 MM HG: ICD-10-PCS | Mod: CPTII,,, | Performed by: OBSTETRICS & GYNECOLOGY

## 2023-06-06 PROCEDURE — 1159F PR MEDICATION LIST DOCUMENTED IN MEDICAL RECORD: ICD-10-PCS | Mod: CPTII,,, | Performed by: OBSTETRICS & GYNECOLOGY

## 2023-06-06 PROCEDURE — 99395 PREV VISIT EST AGE 18-39: CPT | Mod: ,,, | Performed by: OBSTETRICS & GYNECOLOGY

## 2023-06-06 PROCEDURE — 3078F DIAST BP <80 MM HG: CPT | Mod: CPTII,,, | Performed by: OBSTETRICS & GYNECOLOGY

## 2023-06-06 PROCEDURE — 3074F SYST BP LT 130 MM HG: CPT | Mod: CPTII,,, | Performed by: OBSTETRICS & GYNECOLOGY

## 2023-06-06 PROCEDURE — 1160F PR REVIEW ALL MEDS BY PRESCRIBER/CLIN PHARMACIST DOCUMENTED: ICD-10-PCS | Mod: CPTII,,, | Performed by: OBSTETRICS & GYNECOLOGY

## 2023-06-06 NOTE — PROGRESS NOTES
Patient ID: [id number]   Chief Complaint: Annual exam  Chief Complaint   Patient presents with    Annual Exam     PT PRESENTS TO CLINIC FOR ANNUAL EXAM, OTHERWISE NO COMPLAINTS.     HPI:   Rocky Burch is a 32 y.o. year old  here for her Annual Exam. Patient's last menstrual period was 2023. She is doing well. Denies any health changes. NO COMPLAINTS. WOULD LIKE TO CHECK LABS TO SEE IF OVULATING, ACCORDING TO TEM CHART, SHE HASN'T ALTHOUGH SHE HAS HAD TWO CYCLES.     Subjective:     Past Medical History:   Diagnosis Date    Anxiety disorder, unspecified      Past Surgical History:   Procedure Laterality Date    DENTAL SURGERY       Social History     Tobacco Use    Smoking status: Never    Smokeless tobacco: Never   Substance Use Topics    Alcohol use: Not Currently    Drug use: Never     Family History   Problem Relation Age of Onset    Depression Father     Hypertension Father     Diabetes type II Mother     Gestational diabetes Mother      OB History    Para Term  AB Living   1 1   1       SAB IAB Ectopic Multiple Live Births         0        # Outcome Date GA Lbr José Miguel/2nd Weight Sex Delivery Anes PTL Lv   1  23 33w2d 02:25 / 00:18 0.959 kg (2 lb 1.8 oz) F Vag-Spont None N FD       Current Outpatient Medications:     busPIRone (BUSPAR) 10 MG tablet, Take 10 mg by mouth 3 (three) times daily as needed., Disp: , Rfl:     cetirizine (ZYRTEC) 10 MG tablet, Take 10 mg by mouth once daily., Disp: , Rfl:     citalopram (CELEXA) 20 MG tablet, Take 20 mg by mouth once daily., Disp: , Rfl:     hydrOXYzine pamoate (VISTARIL) 50 MG Cap, Take 50 mg by mouth nightly as needed., Disp: , Rfl:     ondansetron (ZOFRAN) 4 MG tablet, Take 8 mg by mouth every 6 (six) hours as needed for Nausea., Disp: , Rfl:     prenatal vit no.124/iron/folic (PRENATAL VITAMIN ORAL), Take 1 tablet by mouth once daily., Disp: , Rfl:     MENARCHEAL:  Cycle Length: 5 days   Flow: normal  Dysmenorrhea: No  If yes:  Mild  Intermenstrual Bleeding: No  PAP:  Last PAP:01/11/2021    History of Abnormal PAP Smear: NO  Treated: N/A  HPV Vaccine: NO  INTERCOURSE:  Dyspareunia: No  Postcoital Bleeding: No  History of STI: No   If yes, then: No   Current Birth Control Method: none  Sexually Active: yes  Review of Systems 12 point review of systems conducted, negative except as stated in the history of present illness. See HPI for details.  Objective:   Visit Vitals  /60   Pulse 90   Ht 5' (1.524 m)   Wt 68.3 kg (150 lb 11 oz)   LMP 05/16/2023   BMI 29.43 kg/m²     Physical Exam:  Physical Exam  Constitutional:  General Appearance : alert, in no acute distress, normal, well nourished.  Respiratory:  Respiratory Effort: normal.  Breast:  Right: Inspection/palpation: no discharge, no masses present, no nipple retraction, no skin changes, no skin dimpling, no tenderness, no lymphadenopathy, no axillary mass, no axillary tenderness.  Left: Inspection/palpation: no discharge, no masses present, no nipple retraction, no skin changes, no skin dimpling, no tenderness, no lymphadenopathy, no axillary mass, no axillary tenderness.  Gastrointestinal:  Abdomen: no masses. no tender, nondistended.  Liver and spleen: normal  Hernias: no hernias present, no inguinal adenopathy.  Genitourinary:  External Genitalia: normal, no lesions.  Vagina: normal appearance, no abnormal discharge, no lesions.  Bladder: no mass, nontender.  Urethra: no erythema or lesions present.  Cervix: no lesions, non tender. Pap Done/CXS  Uterus: nontender, normal contour, normal mobility, normal size.   Adnexa: no masses, no tenderness.  Anus and Perineum: visually normal.   Chaperone Present  No results found for this or any previous visit (from the past 24 hour(s)).    Assessment/Plan:   Assessment:   Routine gynecological examination  -     Cancel: Liquid-Based Pap Smear, Screening Screening  -     Liquid-Based Pap Smear, Screening Screening    Anovulation  -      Progesterone; Future; Expected date: 07/06/2023      Follow up in about 1 year (around 6/6/2024) for [employer]. In addition to their scheduled FU, the patient has also been instructed to follow up on as needed basis. All questions were answered and the patient voiced understanding of the above issues.

## 2023-06-12 ENCOUNTER — DOCUMENTATION ONLY (OUTPATIENT)
Dept: OBSTETRICS AND GYNECOLOGY | Facility: CLINIC | Age: 33
End: 2023-06-12
Payer: COMMERCIAL

## 2023-06-12 LAB — PSYCHE PATHOLOGY RESULT: NORMAL

## 2023-08-08 DIAGNOSIS — F32.A DEPRESSION, UNSPECIFIED DEPRESSION TYPE: Primary | ICD-10-CM

## 2023-08-08 RX ORDER — CITALOPRAM 20 MG/1
20 TABLET, FILM COATED ORAL DAILY
Qty: 30 TABLET | Refills: 5 | Status: SHIPPED | OUTPATIENT
Start: 2023-08-08 | End: 2024-03-25 | Stop reason: SDUPTHER

## 2023-09-18 ENCOUNTER — TELEPHONE (OUTPATIENT)
Dept: OBSTETRICS AND GYNECOLOGY | Facility: CLINIC | Age: 33
End: 2023-09-18
Payer: COMMERCIAL

## 2023-09-18 NOTE — TELEPHONE ENCOUNTER
----- Message from Bety Ireland sent at 9/18/2023  2:37 PM CDT -----  Regarding: CALL BACK  Pt needs a nurse to call her back. She only said that it's in regards to the rhogam shot. Pt has an appt for her new ob visit next week.

## 2023-09-18 NOTE — TELEPHONE ENCOUNTER
RETURNED PT. CALL.  CONFIRMED.  HAS HAD POSITIVE PREG TEST WITH LMP 23. STATES STARTED SPOTTING BROWNISH DISCHARGE FRIDAY AFTER WIPING UPON URINATION. STATES HAS BEEN HAPPENING APROX. TWICE A DAY SINCE FRIDAY AND I AM RH NEG AND LAST RHOGAM WAS 3-23-23 AND DON'T KNOW IF I NEED ANOTHER RHOGAM INJECTION. STATES LAST INTERCOURSE ONE WEEK AGO. I SPOKE WITH DR. COWAN. APPOINT. SCHEDULED FOR TOMORROW 23. PT. AWARE.

## 2023-09-19 ENCOUNTER — INITIAL PRENATAL (OUTPATIENT)
Dept: OBSTETRICS AND GYNECOLOGY | Facility: CLINIC | Age: 33
End: 2023-09-19
Payer: COMMERCIAL

## 2023-09-19 ENCOUNTER — HOSPITAL ENCOUNTER (OUTPATIENT)
Facility: HOSPITAL | Age: 33
Discharge: HOME | End: 2023-09-19
Attending: OBSTETRICS & GYNECOLOGY | Admitting: OBSTETRICS & GYNECOLOGY
Payer: COMMERCIAL

## 2023-09-19 ENCOUNTER — TELEPHONE (OUTPATIENT)
Dept: OBSTETRICS AND GYNECOLOGY | Facility: CLINIC | Age: 33
End: 2023-09-19

## 2023-09-19 ENCOUNTER — HOSPITAL ENCOUNTER (OUTPATIENT)
Dept: RADIOLOGY | Facility: HOSPITAL | Age: 33
Discharge: HOME | End: 2023-09-19
Attending: OBSTETRICS & GYNECOLOGY
Payer: COMMERCIAL

## 2023-09-19 VITALS
DIASTOLIC BLOOD PRESSURE: 64 MMHG | WEIGHT: 143.75 LBS | HEART RATE: 86 BPM | BODY MASS INDEX: 28.07 KG/M2 | SYSTOLIC BLOOD PRESSURE: 120 MMHG

## 2023-09-19 DIAGNOSIS — O26.891 RH NEGATIVE STATUS DURING PREGNANCY IN FIRST TRIMESTER: ICD-10-CM

## 2023-09-19 DIAGNOSIS — O20.0 THREATENED ABORTION: Primary | ICD-10-CM

## 2023-09-19 DIAGNOSIS — G47.00 INSOMNIA, UNSPECIFIED TYPE: Primary | ICD-10-CM

## 2023-09-19 DIAGNOSIS — Z34.90 PREGNANCY, UNSPECIFIED GESTATIONAL AGE: ICD-10-CM

## 2023-09-19 DIAGNOSIS — Z11.3 ENCOUNTER FOR SCREENING FOR INFECTIONS WITH PREDOMINANTLY SEXUAL MODE OF TRANSMISSION: ICD-10-CM

## 2023-09-19 DIAGNOSIS — O20.0 THREATENED ABORTION: ICD-10-CM

## 2023-09-19 DIAGNOSIS — Z67.91 RH NEGATIVE STATUS DURING PREGNANCY IN FIRST TRIMESTER: ICD-10-CM

## 2023-09-19 DIAGNOSIS — N91.2 AMENORRHEA: ICD-10-CM

## 2023-09-19 DIAGNOSIS — O20.9 VAGINAL BLEEDING AFFECTING EARLY PREGNANCY: ICD-10-CM

## 2023-09-19 DIAGNOSIS — O20.9 VAGINAL BLEEDING AFFECTING EARLY PREGNANCY: Primary | ICD-10-CM

## 2023-09-19 DIAGNOSIS — O20.0 MISCARRIAGE, THREATENED, EARLY PREGNANCY: Primary | ICD-10-CM

## 2023-09-19 LAB
AMPHET UR QL SCN: NEGATIVE
B-HCG FREE SERPL-ACNC: NORMAL MIU/ML
B-HCG UR QL: POSITIVE
BARBITURATE SCN PRESENT UR: NEGATIVE
BENZODIAZ UR QL SCN: NEGATIVE
BILIRUB UR QL STRIP: NEGATIVE
CANNABINOIDS UR QL SCN: NEGATIVE
COCAINE UR QL SCN: NEGATIVE
CTP QC/QA: YES
GLUCOSE UR QL STRIP: NEGATIVE
KETONES UR QL STRIP: NEGATIVE
LEUKOCYTE ESTERASE UR QL STRIP: NEGATIVE
METHADONE UR QL SCN: NEGATIVE
OPIATES UR QL SCN: NEGATIVE
PCP UR QL: NEGATIVE
PH UR: 6 [PH] (ref 3–11)
PH, POC UA: 6.5
POC BLOOD, URINE: NEGATIVE
POC NITRATES, URINE: NEGATIVE
PROT UR QL STRIP: NEGATIVE
RH IMMUNE GLOBULIN: NORMAL
SP GR UR STRIP: 1.02 (ref 1–1.03)
UROBILINOGEN UR STRIP-ACNC: 0.2 (ref 0.1–1.1)

## 2023-09-19 PROCEDURE — 76817 TRANSVAGINAL US OBSTETRIC: CPT | Mod: 26,,, | Performed by: OBSTETRICS & GYNECOLOGY

## 2023-09-19 PROCEDURE — 76817 US OB/GYN EXTENDED PROCEDURE (VIEWPOINT): ICD-10-PCS | Mod: 26,,, | Performed by: OBSTETRICS & GYNECOLOGY

## 2023-09-19 PROCEDURE — 84702 CHORIONIC GONADOTROPIN TEST: CPT | Performed by: OBSTETRICS & GYNECOLOGY

## 2023-09-19 PROCEDURE — 81025 POCT URINE PREGNANCY: ICD-10-PCS | Mod: ,,, | Performed by: OBSTETRICS & GYNECOLOGY

## 2023-09-19 PROCEDURE — 0500F INITIAL PRENATAL CARE VISIT: CPT | Mod: ,,, | Performed by: OBSTETRICS & GYNECOLOGY

## 2023-09-19 PROCEDURE — 63600519 RHOGAM PHARM REV CODE 636 ALT 250 W HCPCS: Performed by: OBSTETRICS & GYNECOLOGY

## 2023-09-19 PROCEDURE — 0500F PR INITIAL PRENATAL CARE VISIT: ICD-10-PCS | Mod: ,,, | Performed by: OBSTETRICS & GYNECOLOGY

## 2023-09-19 PROCEDURE — 84144 ASSAY OF PROGESTERONE: CPT | Performed by: OBSTETRICS & GYNECOLOGY

## 2023-09-19 PROCEDURE — 36415 COLL VENOUS BLD VENIPUNCTURE: CPT | Performed by: OBSTETRICS & GYNECOLOGY

## 2023-09-19 PROCEDURE — 81025 URINE PREGNANCY TEST: CPT | Mod: ,,, | Performed by: OBSTETRICS & GYNECOLOGY

## 2023-09-19 PROCEDURE — 76817 TRANSVAGINAL US OBSTETRIC: CPT | Mod: TC

## 2023-09-19 RX ADMIN — HUMAN RHO(D) IMMUNE GLOBULIN 300 MCG: 300 INJECTION, SOLUTION INTRAMUSCULAR at 05:09

## 2023-09-19 NOTE — TELEPHONE ENCOUNTER
----- Message from Bety Ireland sent at 9/19/2023  3:41 PM CDT -----  Regarding: CALL BACK  Pt called to see if we got any results from her us and labs that she did today.

## 2023-09-19 NOTE — NURSING
1755--PT TO OB FOR RHOGAM INJECTION, LABS VERIFIED DRAWN PER DR COWAN ORDERS. VS WNL. PT GIVEN OPPORTUNITY TO ANSWER QUESTIONS. ORDERS TO FOLLOWUP AT 0800 AM AT OFFICE .1812-PT DC TO HOME WITH INSTRUCTIONS TO RETURN FOR INCREASE IN PAIN OR BLEEDING .

## 2023-09-19 NOTE — PROGRESS NOTES
Chief Complaint: New OB     HPI:      Rocky Burch is a 33 y.o.  who presents to clinic for new ob. Initial Prenatal Visit   C/O SPOTTING SINCE LAST FRIDAY, ONLY NOTICED WITH WIPING. DENIES INTERCOURSE PRIOR TO THIS OR ANY HEAVY LIFTING.     Patient's last menstrual period was 2023.   Last pap 2023    Past Medical History:   Diagnosis Date    Anxiety disorder, unspecified     Stillborn, abnormal      Past Surgical History:   Procedure Laterality Date    DENTAL SURGERY       Social History     Tobacco Use    Smoking status: Never    Smokeless tobacco: Never   Substance Use Topics    Alcohol use: Not Currently    Drug use: Never     Family History   Problem Relation Age of Onset    Depression Father     Hypertension Father     Diabetes type II Mother     Gestational diabetes Mother      OB History    Para Term  AB Living   2 1   1       SAB IAB Ectopic Multiple Live Births         0        # Outcome Date GA Lbr José Miguel/2nd Weight Sex Delivery Anes PTL Lv   2 Current            1  23 33w2d 02:25 / 00:18 0.959 kg (2 lb 1.8 oz) F Vag-Spont None N FD       Current Outpatient Medications:     citalopram (CELEXA) 20 MG tablet, Take 1 tablet (20 mg total) by mouth once daily., Disp: 30 tablet, Rfl: 5    hydrOXYzine pamoate (VISTARIL) 50 MG Cap, Take 50 mg by mouth nightly as needed., Disp: , Rfl:     prenatal vit no.124/iron/folic (PRENATAL VITAMIN ORAL), Take 1 tablet by mouth once daily., Disp: , Rfl:     busPIRone (BUSPAR) 10 MG tablet, Take 10 mg by mouth 3 (three) times daily as needed., Disp: , Rfl:     cetirizine (ZYRTEC) 10 MG tablet, Take 10 mg by mouth once daily., Disp: , Rfl:     ondansetron (ZOFRAN) 4 MG tablet, Take 8 mg by mouth every 6 (six) hours as needed for Nausea., Disp: , Rfl:       ROS:     Review of Systems   General/Constitutional:  Hot flash denies . Chills denies. Fatigue/weakness denies . Fever denies . Night sweats denies  .  Respiratory:  Cough denies . Hemoptysis denies . SOB denies . Sputum production denies . Wheezing denies .  Breast:  Changes in skin of nipple or breast denies . Breast lump denies. Breast pain denies. Nipple discharge denies .  Cardiovascular:  Chest pain denies . Dizziness denies . Palpitations denies . Swelling in hands/feet denies .   Gastrointestinal:  Abdominal pain denies . Blood in stool denies . Constipation denies . Diarrhea denies . Heartburn denies . Nausea denies . Vomiting denies .  Women Only:  Vaginal bleeding denies . Vaginal discharge/ Odor denies . Vaginal lesion/pain denies Breakthrough bleeding denies . Pelvic pain denies . Decreased libido denies. Vulvar lesion/ulcer denies . Prolapse of genital organs denies . Heavy bleeding during menses denies. Irregular menses denies . Painful intercourse denies . Painful menses denies .  Genitourinary:  Incontinence denies . Blood in urine denies. Frequest urination denies . Painful urination denies.  Musculoskeletal:  Joint/harrison pain denies. Decreased ROM denies. Muscle aches denies. Swollen joints denies . Weakness denies.  Neurologic:  Confusion denies. Trouble walking denies. Trouble with balance denies Balance difficulty denies. Gait abnormalities denies. Headache denies. Tingling/Numbness denies.  Psychiatric:  Brinda denies. Homicidal thoughts denies. Mood lability denies. Personality changes denies. Anxiety or panic attacks denies. Auditory/visual hallucinations denies. Delusions denies. Depressed mood denies. Suicidal thoughts denies.    Physical Exam:   /64   Pulse 86   Wt 65.2 kg (143 lb 11.8 oz)   [facility] 07/25/2023   BMI 28.07 kg/m²   Body mass index is 28.07 kg/m².   PHYSICAL EXAM:  Constitutional:  General Appearance : alert, in no acute distress, normal, well nourished.  Neck/Thyroid:  Inspection/Palpation: normal. Thyroid: normal size and shape.  Respiratory:  Auscultation: clear to auscultation bilaterally. Respiratory Effort:  normal.  Gastrointestinal:  Abdomen: no masses. no tender, nondistended.  Liver and spleen: normal  Hernias: no hernias present, no inguinal adenopathy.  Genitourinary:  External Genitalia: normal, no lesions.  Vagina: normal appearance, no abnormal discharge, no lesions.  Bladder: no mass, nontender.  Urethra: no erythema or lesions present.  Cervix: no lesions, non tender. CLOSED, ONE SWAB COLLECTED.  Uterus: nontender, normal contour, normal mobility, normal size.  Adnexa: no masses, no tenderness.  Anus and Perineum: visually normal.   Chaperone Present    Assessment/Plan:       Vaginal bleeding affecting early pregnancy  -     US OB Transvaginal; Future  -     CBC Without Differential; Future  -     Comprehensive Metabolic Panel; Future  -     Progesterone; Future  -     Type & Screen; Future  -     HCG, Quantitative; Future  -     Prepare Rh Immune Globulin; Future    Pregnancy, unspecified gestational age  -     US OB/GYN Procedure ([facility]) - Extended List - Today  -     POCT Urinalysis, Dipstick, Automated, W/O Scope  -     Cancel: POCT RAPID DRUG SCREEN  -     MDL Sendout Test  -     Urine Culture High Risk  -     Drug Screen, Urine    Amenorrhea  -     POCT Urine Pregnancy    Encounter for screening for infections with predominantly sexual mode of transmission  -     MDL Sendout Test    Rh negative status during pregnancy in first trimester      No follow-ups on file.    Counselin. Encouraged compliance with prenatal vitamins.  2. Discussed PNL and genetic testing.   3. Safety of exercise discussed with patient, and continued active lifestyle encouraged.  4. COVID-19 virus precautions for both patient and her spouse discussed, and available data on COVID vaccination reviewed.  5. Normal course of prenatal care reviewed.  6. Medications safe in pregnancy discussed and list provided.    Use of the [facility] Patient Portal discussed and encouraged during today's visit.

## 2023-09-20 ENCOUNTER — HOSPITAL ENCOUNTER (OUTPATIENT)
Dept: PREADMISSION TESTING | Facility: HOSPITAL | Age: 33
Discharge: HOME | End: 2023-09-20
Attending: OBSTETRICS & GYNECOLOGY
Payer: COMMERCIAL

## 2023-09-20 ENCOUNTER — ROUTINE PRENATAL (OUTPATIENT)
Dept: OBSTETRICS AND GYNECOLOGY | Facility: CLINIC | Age: 33
End: 2023-09-20
Payer: COMMERCIAL

## 2023-09-20 ENCOUNTER — ANESTHESIA EVENT (OUTPATIENT)
Dept: SURGERY | Facility: HOSPITAL | Age: 33
End: 2023-09-20
Payer: COMMERCIAL

## 2023-09-20 VITALS
SYSTOLIC BLOOD PRESSURE: 122 MMHG | WEIGHT: 140.56 LBS | HEART RATE: 90 BPM | BODY MASS INDEX: 27.45 KG/M2 | DIASTOLIC BLOOD PRESSURE: 60 MMHG

## 2023-09-20 VITALS — HEIGHT: 60 IN | BODY MASS INDEX: 27.48 KG/M2 | WEIGHT: 140 LBS

## 2023-09-20 DIAGNOSIS — O02.0 ANEMBRYONIC PREGNANCY: Primary | ICD-10-CM

## 2023-09-20 DIAGNOSIS — O02.0 ANEMBRYONIC PREGNANCY: ICD-10-CM

## 2023-09-20 DIAGNOSIS — O20.9 VAGINAL BLEEDING AFFECTING EARLY PREGNANCY: ICD-10-CM

## 2023-09-20 LAB
APPEARANCE UR: CLEAR
APTT PPP: 28.5 SECONDS (ref 23–29.4)
B-HCG FREE SERPL-ACNC: NORMAL MIU/ML
BILIRUB UR QL STRIP.AUTO: NEGATIVE
COLOR UR: YELLOW
GLUCOSE UR QL STRIP.AUTO: NEGATIVE
INR PPP: 1
KETONES UR QL STRIP.AUTO: NEGATIVE
LEUKOCYTE ESTERASE UR QL STRIP.AUTO: NEGATIVE
NITRITE UR QL STRIP.AUTO: NEGATIVE
PH UR STRIP.AUTO: 7 [PH]
PROGEST SERPL-MCNC: 14.1 NG/ML
PROT UR QL STRIP.AUTO: NEGATIVE
PROTHROMBIN TIME: 10.2 SECONDS (ref 9.3–11.9)
RBC UR QL AUTO: NEGATIVE
SP GR UR STRIP.AUTO: <=1.005 (ref 1–1.03)
UROBILINOGEN UR STRIP-ACNC: 0.2

## 2023-09-20 PROCEDURE — 99213 OFFICE O/P EST LOW 20 MIN: CPT | Mod: ,,, | Performed by: OBSTETRICS & GYNECOLOGY

## 2023-09-20 PROCEDURE — 99213 PR OFFICE/OUTPT VISIT, EST, LEVL III, 20-29 MIN: ICD-10-PCS | Mod: ,,, | Performed by: OBSTETRICS & GYNECOLOGY

## 2023-09-20 PROCEDURE — 36415 COLL VENOUS BLD VENIPUNCTURE: CPT | Performed by: OBSTETRICS & GYNECOLOGY

## 2023-09-20 PROCEDURE — 81003 URINALYSIS AUTO W/O SCOPE: CPT | Performed by: OBSTETRICS & GYNECOLOGY

## 2023-09-20 RX ORDER — SODIUM CHLORIDE, SODIUM LACTATE, POTASSIUM CHLORIDE, CALCIUM CHLORIDE 600; 310; 30; 20 MG/100ML; MG/100ML; MG/100ML; MG/100ML
INJECTION, SOLUTION INTRAVENOUS CONTINUOUS
Status: CANCELLED | OUTPATIENT
Start: 2023-09-21

## 2023-09-20 RX ORDER — FAMOTIDINE 20 MG/1
20 TABLET, FILM COATED ORAL
Status: SHIPPED | OUTPATIENT
Start: 2023-09-20

## 2023-09-20 NOTE — DISCHARGE INSTRUCTIONS

## 2023-09-20 NOTE — PROGRESS NOTES
HPI  Rocky Burch is a 33 y.o.  8w1d here for Routine Prenatal Visit   C/O VAGINAL BLEEDING, INCREASED FROM YESTERDAY.  LABS AND U/S REVIEWED WITH PATIENT.  U/S REVEALS ANEMBRYONIC PREGNANCY WITH APPEARANCE OF THE ENDOMETRIUM SUSPICIOUS FOR MOLAR PREGNANCY.  QBETA GREATER THAN 150K  PT DID RECEIVE RHOGAM YESTERDAY.    Rocky's allergies, medications, history, and problem list were updated as appropriate.    ROS:  A comprehensive review of symptoms was completed and negative except as noted above.    Physical Exam:  /60   Pulse 90   Wt 63.7 kg (140 lb 8.7 oz)   LMP 2023   BMI 27.45 kg/m²   Gen: No distress  Abdomen: Gravid, non-tender  Extremities: No edema     Recent Results (from the past 24 hour(s))   POCT Urine Pregnancy    Collection Time: 23 11:45 AM   Result Value Ref Range    POC Preg Test, Ur Positive (A) Negative     Acceptable Yes    POCT Urinalysis, Dipstick, Automated, W/O Scope    Collection Time: 23 11:46 AM   Result Value Ref Range    POC Blood, Urine Negative Negative    POC Bilirubin, Urine Negative Negative    POC Urobilinogen, Urine 0.2 0.1 - 1.1    POC Ketones, Urine Negative Negative    POC Protein, Urine Negative Negative    POC Nitrates, Urine Negative Negative    POC Glucose, Urine Negative Negative    pH, UA 6.5     POC Specific Gravity, Urine 1.025 1.003 - 1.029    POC Leukocytes, Urine Negative Negative   Drug Screen, Urine    Collection Time: 23 11:54 AM   Result Value Ref Range    Amphetamines, Urine Negative Negative    Barbituates, Urine Negative Negative    Benzodiazepine, Urine Negative Negative    Cannabinoids, Urine Negative Negative    Cocaine, Urine Negative Negative    Opiates, Urine Negative Negative    Phencyclidine, Urine Negative Negative    pH, Urine 6.0 3.0 - 11.0    Methadone, Urine Negative Negative   CBC Without Differential    Collection Time: 23 12:50 PM   Result Value Ref Range    WBC 9.71 4.00 - 11.50  x10(3)/mcL    RBC 4.41 4.00 - 5.10 x10(6)/mcL    Hgb 13.3 11.8 - 16.0 g/dL    Hct 38.3 36.0 - 48.0 %    MCV 86.8 79.0 - 99.0 fL    MCH 30.2 27.0 - 34.0 pg    MCHC 34.7 31.0 - 37.0 g/dL    RDW 12.8 11.0 - 14.5 %    Platelet 288 140 - 371 x10(3)/mcL    MPV 11.1 9.4 - 12.4 fL    NRBC% 0.0 <=1 %   Comprehensive Metabolic Panel    Collection Time: 09/19/23 12:50 PM   Result Value Ref Range    Sodium Level 135 135 - 145 mmol/L    Potassium Level 3.9 3.5 - 5.1 mmol/L    Chloride 103 98 - 110 mmol/L    Carbon Dioxide 21 21 - 32 mmol/L    Glucose Level 99 70 - 115 mg/dL    Blood Urea Nitrogen 10.0 7.0 - 20.0 mg/dL    Creatinine 0.65 (L) 0.66 - 1.25 mg/dL    Calcium Level Total 9.8 8.4 - 10.2 mg/dL    Protein Total 7.9 6.3 - 8.2 gm/dL    Albumin Level 4.6 3.4 - 5.0 g/dL    Globulin 3.3 2.0 - 3.9 gm/dL    Albumin/Globulin Ratio 1.4 ratio    Bilirubin Total 0.5 0.0 - 1.0 mg/dL    Alkaline Phosphatase 55 50 - 144 unit/L    Alanine Aminotransferase 19 1 - 45 unit/L    Aspartate Aminotransferase 29 14 - 36 unit/L    eGFR >90 mls/min/1.73/m2    Anion Gap 11.0 2.0 - 13.0 mEq/L    BUN/Creatinine Ratio 15 12 - 20   Type & Screen    Collection Time: 09/19/23 12:50 PM   Result Value Ref Range    Specimen Outdate 09/22/2023 23:59     ABO and RH O NEG     Antibody Screen NEG    Progesterone    Collection Time: 09/19/23  4:47 PM   Result Value Ref Range    Progesterone Level 14.1 ng/mL   HCG, Quantitative    Collection Time: 09/19/23  4:47 PM   Result Value Ref Range    Beta Human Chorionic Gonadotropin Quantitative 153,780.00 mIU/mL   Prepare Rh Immune Globulin    Collection Time: 09/19/23  4:59 PM   Result Value Ref Range    Rh Immune Glogulin HQ02A42 Issued      Chaperone Present   ASSESSMENT/PLAN:  1. Vaginal bleeding affecting early pregnancy    2. Anembryonic pregnancy    OPTIONS FOR THERAPY DISCUSSED WITH PATIENT. WITH HIGH SUSPICION OF MOLE, D&C IS INDICATED.  DISCUSSED DOING MICROARRAY, GIVEN THE PATIENT'S PAST HISTORY OF TRISOMIC  FETUS.    Prec given    Follow Up:  Follow up in about 2 weeks (around 10/4/2023) for POST OP.

## 2023-09-20 NOTE — ANESTHESIA PREPROCEDURE EVALUATION
09/20/2023  Rocky Burch is a 33 y.o., female.      Pre-op Assessment    I have reviewed the Patient Summary Reports.     I have reviewed the Nursing Notes. I have reviewed the NPO Status.   I have reviewed the Medications.     Review of Systems  Anesthesia Hx:  No problems with previous Anesthesia  Denies Family Hx of Anesthesia complications.   Denies Personal Hx of Anesthesia complications.   Hematology/Oncology:  Hematology Normal   Oncology Normal     EENT/Dental:EENT/Dental Normal   Cardiovascular:  Cardiovascular Normal Exercise tolerance: good     Pulmonary:  Pulmonary Normal    Renal/:  Renal/ Normal     Hepatic/GI:  Hepatic/GI Normal    Musculoskeletal:  Musculoskeletal Normal    Neurological:  Neurology Normal    Endocrine:  Endocrine Normal    Dermatological:  Skin Normal    Psych:   Psychiatric History          Physical Exam  General: Well nourished, Cooperative, Alert and Oriented    Airway:  Mallampati: II / II  Mouth Opening: Normal  TM Distance: Normal  Tongue: Normal  Neck ROM: Normal ROM    Dental:  Intact        Anesthesia Plan  Type of Anesthesia, risks & benefits discussed:    Anesthesia Type: Gen ETT, Gen Supraglottic Airway  Intra-op Monitoring Plan: Standard ASA Monitors  Post Op Pain Control Plan: multimodal analgesia  Induction:  IV  Airway Plan: Direct  Informed Consent: Informed consent signed with the Patient and all parties understand the risks and agree with anesthesia plan.  All questions answered. Patient consented to blood products? Yes  ASA Score: 2  Day of Surgery Review of History & Physical: H&P Update referred to the surgeon/provider.I have interviewed and examined the patient. I have reviewed the patient's H&P dated: There are no significant changes.     Ready For Surgery From Anesthesia Perspective.     .

## 2023-09-21 ENCOUNTER — ANESTHESIA (OUTPATIENT)
Dept: SURGERY | Facility: HOSPITAL | Age: 33
End: 2023-09-21
Payer: COMMERCIAL

## 2023-09-21 ENCOUNTER — HOSPITAL ENCOUNTER (OUTPATIENT)
Facility: HOSPITAL | Age: 33
Discharge: HOME | End: 2023-09-21
Attending: OBSTETRICS & GYNECOLOGY | Admitting: OBSTETRICS & GYNECOLOGY
Payer: COMMERCIAL

## 2023-09-21 VITALS
HEIGHT: 60 IN | TEMPERATURE: 98 F | RESPIRATION RATE: 18 BRPM | OXYGEN SATURATION: 100 % | WEIGHT: 140 LBS | DIASTOLIC BLOOD PRESSURE: 53 MMHG | SYSTOLIC BLOOD PRESSURE: 106 MMHG | HEART RATE: 71 BPM | BODY MASS INDEX: 27.48 KG/M2

## 2023-09-21 DIAGNOSIS — O20.9 VAGINAL BLEEDING AFFECTING EARLY PREGNANCY: ICD-10-CM

## 2023-09-21 DIAGNOSIS — O02.0 ANEMBRYONIC PREGNANCY: ICD-10-CM

## 2023-09-21 PROBLEM — O36.4XX1 FETAL DEMISE, GREATER THAN 22 WEEKS, ANTEPARTUM, FETUS 1: Status: RESOLVED | Noted: 2023-02-28 | Resolved: 2023-09-21

## 2023-09-21 PROBLEM — O35.12X0: Status: RESOLVED | Noted: 2023-02-28 | Resolved: 2023-09-21

## 2023-09-21 PROBLEM — Z67.91 RH NEGATIVE STATE IN ANTEPARTUM PERIOD, THIRD TRIMESTER: Status: RESOLVED | Noted: 2023-02-28 | Resolved: 2023-09-21

## 2023-09-21 PROBLEM — O26.893 RH NEGATIVE STATE IN ANTEPARTUM PERIOD, THIRD TRIMESTER: Status: RESOLVED | Noted: 2023-02-28 | Resolved: 2023-09-21

## 2023-09-21 PROCEDURE — 36000704 HC OR TIME LEV I 1ST 15 MIN: Performed by: OBSTETRICS & GYNECOLOGY

## 2023-09-21 PROCEDURE — 71000015 HC POSTOP RECOV 1ST HR: Performed by: OBSTETRICS & GYNECOLOGY

## 2023-09-21 PROCEDURE — 71000016 HC POSTOP RECOV ADDL HR: Performed by: OBSTETRICS & GYNECOLOGY

## 2023-09-21 PROCEDURE — 25000003 PHARM REV CODE 250: Performed by: OBSTETRICS & GYNECOLOGY

## 2023-09-21 PROCEDURE — 37000008 HC ANESTHESIA 1ST 15 MINUTES: Performed by: OBSTETRICS & GYNECOLOGY

## 2023-09-21 PROCEDURE — 36000705 HC OR TIME LEV I EA ADD 15 MIN: Performed by: OBSTETRICS & GYNECOLOGY

## 2023-09-21 PROCEDURE — 63600175 PHARM REV CODE 636 W HCPCS: Performed by: OBSTETRICS & GYNECOLOGY

## 2023-09-21 PROCEDURE — 25000003 PHARM REV CODE 250: Performed by: NURSE ANESTHETIST, CERTIFIED REGISTERED

## 2023-09-21 PROCEDURE — 59840 PR INDUCED ABORTN BY D&C: ICD-10-PCS | Mod: ,,, | Performed by: OBSTETRICS & GYNECOLOGY

## 2023-09-21 PROCEDURE — 37000009 HC ANESTHESIA EA ADD 15 MINS: Performed by: OBSTETRICS & GYNECOLOGY

## 2023-09-21 PROCEDURE — 63600175 PHARM REV CODE 636 W HCPCS: Performed by: NURSE ANESTHETIST, CERTIFIED REGISTERED

## 2023-09-21 PROCEDURE — 71000033 HC RECOVERY, INTIAL HOUR: Performed by: OBSTETRICS & GYNECOLOGY

## 2023-09-21 PROCEDURE — D9220A PRA ANESTHESIA: ICD-10-PCS | Mod: ,,, | Performed by: NURSE ANESTHETIST, CERTIFIED REGISTERED

## 2023-09-21 PROCEDURE — D9220A PRA ANESTHESIA: Mod: ,,, | Performed by: NURSE ANESTHETIST, CERTIFIED REGISTERED

## 2023-09-21 PROCEDURE — 59840 INDUCED ABORTION D&C: CPT | Mod: ,,, | Performed by: OBSTETRICS & GYNECOLOGY

## 2023-09-21 RX ORDER — ONDANSETRON 4 MG/1
8 TABLET, ORALLY DISINTEGRATING ORAL EVERY 8 HOURS PRN
Status: DISCONTINUED | OUTPATIENT
Start: 2023-09-21 | End: 2023-09-21 | Stop reason: HOSPADM

## 2023-09-21 RX ORDER — DIPHENHYDRAMINE HCL 25 MG
25 CAPSULE ORAL EVERY 4 HOURS PRN
Status: DISCONTINUED | OUTPATIENT
Start: 2023-09-21 | End: 2023-09-21 | Stop reason: HOSPADM

## 2023-09-21 RX ORDER — HYDROCODONE BITARTRATE AND ACETAMINOPHEN 5; 325 MG/1; MG/1
1 TABLET ORAL EVERY 4 HOURS PRN
Status: DISCONTINUED | OUTPATIENT
Start: 2023-09-21 | End: 2023-09-21 | Stop reason: HOSPADM

## 2023-09-21 RX ORDER — KETOROLAC TROMETHAMINE 30 MG/ML
INJECTION, SOLUTION INTRAMUSCULAR; INTRAVENOUS
Status: DISCONTINUED | OUTPATIENT
Start: 2023-09-21 | End: 2023-09-21

## 2023-09-21 RX ORDER — SODIUM CHLORIDE, SODIUM LACTATE, POTASSIUM CHLORIDE, CALCIUM CHLORIDE 600; 310; 30; 20 MG/100ML; MG/100ML; MG/100ML; MG/100ML
INJECTION, SOLUTION INTRAVENOUS CONTINUOUS
Status: DISCONTINUED | OUTPATIENT
Start: 2023-09-21 | End: 2023-09-21 | Stop reason: HOSPADM

## 2023-09-21 RX ORDER — MIDAZOLAM HYDROCHLORIDE 1 MG/ML
2 INJECTION INTRAMUSCULAR; INTRAVENOUS
Status: COMPLETED | OUTPATIENT
Start: 2023-09-21 | End: 2023-09-21

## 2023-09-21 RX ORDER — PROPOFOL 10 MG/ML
INJECTION, EMULSION INTRAVENOUS
Status: DISCONTINUED | OUTPATIENT
Start: 2023-09-21 | End: 2023-09-21

## 2023-09-21 RX ORDER — FAMOTIDINE 20 MG/1
20 TABLET, FILM COATED ORAL
Status: COMPLETED | OUTPATIENT
Start: 2023-09-21 | End: 2023-09-21

## 2023-09-21 RX ORDER — DEXAMETHASONE SODIUM PHOSPHATE 4 MG/ML
INJECTION, SOLUTION INTRA-ARTICULAR; INTRALESIONAL; INTRAMUSCULAR; INTRAVENOUS; SOFT TISSUE
Status: DISCONTINUED | OUTPATIENT
Start: 2023-09-21 | End: 2023-09-21

## 2023-09-21 RX ORDER — FENTANYL CITRATE 50 UG/ML
INJECTION, SOLUTION INTRAMUSCULAR; INTRAVENOUS
Status: DISCONTINUED | OUTPATIENT
Start: 2023-09-21 | End: 2023-09-21

## 2023-09-21 RX ORDER — HYDROXYZINE PAMOATE 50 MG/1
50 CAPSULE ORAL NIGHTLY PRN
Qty: 30 CAPSULE | Refills: 2 | Status: SHIPPED | OUTPATIENT
Start: 2023-09-21 | End: 2024-01-11 | Stop reason: SDUPTHER

## 2023-09-21 RX ORDER — HYDROCODONE BITARTRATE AND ACETAMINOPHEN 5; 325 MG/1; MG/1
1 TABLET ORAL EVERY 6 HOURS PRN
Qty: 10 TABLET | Refills: 0 | Status: SHIPPED | OUTPATIENT
Start: 2023-09-21 | End: 2023-11-15

## 2023-09-21 RX ORDER — DIPHENHYDRAMINE HYDROCHLORIDE 50 MG/ML
25 INJECTION INTRAMUSCULAR; INTRAVENOUS EVERY 4 HOURS PRN
Status: DISCONTINUED | OUTPATIENT
Start: 2023-09-21 | End: 2023-09-21 | Stop reason: HOSPADM

## 2023-09-21 RX ORDER — HYDROMORPHONE HYDROCHLORIDE 1 MG/ML
1 INJECTION, SOLUTION INTRAMUSCULAR; INTRAVENOUS; SUBCUTANEOUS EVERY 4 HOURS PRN
Status: DISCONTINUED | OUTPATIENT
Start: 2023-09-21 | End: 2023-09-21 | Stop reason: HOSPADM

## 2023-09-21 RX ORDER — LIDOCAINE HYDROCHLORIDE 20 MG/ML
INJECTION INTRAVENOUS
Status: DISCONTINUED | OUTPATIENT
Start: 2023-09-21 | End: 2023-09-21

## 2023-09-21 RX ORDER — ONDANSETRON 2 MG/ML
INJECTION INTRAMUSCULAR; INTRAVENOUS
Status: DISCONTINUED | OUTPATIENT
Start: 2023-09-21 | End: 2023-09-21

## 2023-09-21 RX ORDER — KETOROLAC TROMETHAMINE 10 MG/1
10 TABLET, FILM COATED ORAL EVERY 6 HOURS PRN
Qty: 10 TABLET | Refills: 0 | Status: SHIPPED | OUTPATIENT
Start: 2023-09-21 | End: 2023-11-15

## 2023-09-21 RX ADMIN — FENTANYL CITRATE 50 MCG: 50 INJECTION, SOLUTION INTRAMUSCULAR; INTRAVENOUS at 12:09

## 2023-09-21 RX ADMIN — CEFAZOLIN 2 G: 1 INJECTION, POWDER, FOR SOLUTION INTRAMUSCULAR; INTRAVENOUS at 12:09

## 2023-09-21 RX ADMIN — PROPOFOL 140 MG: 10 INJECTION, EMULSION INTRAVENOUS at 12:09

## 2023-09-21 RX ADMIN — LIDOCAINE HYDROCHLORIDE 50 MG: 20 INJECTION, SOLUTION INTRAVENOUS at 12:09

## 2023-09-21 RX ADMIN — GLYCOPYRROLATE 0.2 MG: 0.2 INJECTION, SOLUTION INTRAMUSCULAR; INTRAVITREAL at 10:09

## 2023-09-21 RX ADMIN — FENTANYL CITRATE 100 MCG: 50 INJECTION, SOLUTION INTRAMUSCULAR; INTRAVENOUS at 12:09

## 2023-09-21 RX ADMIN — ONDANSETRON 4 MG: 2 INJECTION INTRAMUSCULAR; INTRAVENOUS at 12:09

## 2023-09-21 RX ADMIN — SODIUM CHLORIDE, POTASSIUM CHLORIDE, SODIUM LACTATE AND CALCIUM CHLORIDE: 600; 310; 30; 20 INJECTION, SOLUTION INTRAVENOUS at 10:09

## 2023-09-21 RX ADMIN — DEXAMETHASONE SODIUM PHOSPHATE 4 MG: 4 INJECTION, SOLUTION INTRA-ARTICULAR; INTRALESIONAL; INTRAMUSCULAR; INTRAVENOUS; SOFT TISSUE at 12:09

## 2023-09-21 RX ADMIN — KETOROLAC TROMETHAMINE 30 MG: 30 INJECTION, SOLUTION INTRAMUSCULAR; INTRAVENOUS at 01:09

## 2023-09-21 RX ADMIN — FAMOTIDINE 20 MG: 20 TABLET ORAL at 10:09

## 2023-09-21 RX ADMIN — MIDAZOLAM HYDROCHLORIDE 2 MG: 1 INJECTION, SOLUTION INTRAMUSCULAR; INTRAVENOUS at 12:09

## 2023-09-21 NOTE — H&P (VIEW-ONLY)
HPI  Rocky Burch is a 33 y.o.  8w1d here for Routine Prenatal Visit   FOR RESULTS OF U/S AND QBETA.  PT DID ULTIMATELY RECEIVE RHOGAM YESTERDAY  C/O VAGINAL BLEEDING.  U/S IS HIGHLY SUSPICIOUS FOR MOLAR PREGNANCY WITH NO EMBRYO NOTED. QBETA IS OVER 150K.  DISCUSSED THERAPY OPTIONS AND THAT D&C IS NECESSARY TO EVACUATED THIS ABNORMAL PREGNANCY.  DISCUSSED DOING MICROARRY TO BETTER UNDERSTAND THE GENETICS OF THIS PREGNANCY, GIVEN THE PATIENT HAS HAD A TRISOMIC FETUS.     Rocky's allergies, medications, history, and problem list were updated as appropriate.    ROS:  A comprehensive review of symptoms was completed and negative except as noted above.    Physical Exam:  /60   Pulse 90   Wt 63.7 kg (140 lb 8.7 oz)   LMP 2023   BMI 27.45 kg/m²   Gen: No distress  Abdomen: Gravid, non-tender  Extremities: No edema     Chaperone Present   ASSESSMENT/PLAN:  1. Anembryonic pregnancy  U/S APPEARANCE AND MARKEDLY ELEVATED BETA HCG VERY SUSPICIOUS FOR MOLAR PREGNANCY  PLAN FOR D&C TOMORROW AT [facility]    -     Case Request Operating Room: DILATION AND CURETTAGE, UTERUS, USING SUCTION  -     Place in Outpatient; Standing  -     Full code; Standing  -     Vital signs; Standing  -     Pulse Oximetry Q Shift; Standing  -     Bed rest with bathroom privileges; Standing  -     Insert peripheral IV; Standing  -     Murphy to Gravity; Standing  -     Notify Physician/Vital Signs Parameters Urine output less than 0.5mL/kg/hr (with indwelling catheter) or 30 mL/hr (without indwelling catheter) or blood glucose greater than 200 mg/dL; Standing  -     Notify physician; Standing  -     Notify Physician - Potential Need of Opioid Reversal; Standing  -     Cancel: Urinalysis, Reflex to Urine Culture; Standing  -     Diet NPO; Standing  -     Place sequential compression device; Standing  -     HCG, Quantitative; Standing  -     Protime-INR; Standing  -     APTT; Standing  -     Cancel: Type & Screen Pre Op; Standing    2.  Vaginal bleeding affecting early pregnancy  -     Case Request Operating Room: DILATION AND CURETTAGE, UTERUS, USING SUCTION  -     Place in Outpatient; Standing  -     Full code; Standing  -     Vital signs; Standing  -     Pulse Oximetry Q Shift; Standing  -     Bed rest with bathroom privileges; Standing  -     Insert peripheral IV; Standing  -     Murphy to Gravity; Standing  -     Notify Physician/Vital Signs Parameters Urine output less than 0.5mL/kg/hr (with indwelling catheter) or 30 mL/hr (without indwelling catheter) or blood glucose greater than 200 mg/dL; Standing  -     Notify physician; Standing  -     Notify Physician - Potential Need of Opioid Reversal; Standing  -     Cancel: Urinalysis, Reflex to Urine Culture; Standing  -     Diet NPO; Standing  -     Place sequential compression device; Standing  -     HCG, Quantitative; Standing  -     Protime-INR; Standing  -     APTT; Standing  -     Cancel: Type & Screen Pre Op; Standing    Other orders  -     lactated ringers infusion  -     IP VTE LOW RISK PATIENT; Standing  -     famotidine tablet 20 mg  -     ceFAZolin (ANCEF) 2 g in dextrose 5 % (D5W) 50 mL IVPB      Prec given    Follow Up:  Follow up in about 2 weeks (around 10/4/2023) for POST OP.

## 2023-09-21 NOTE — DISCHARGE INSTRUCTIONS
DECREASED ACTIVITY UNTIL AFTER APPOINTMENT   NO HEAVY LIFTING OR STRAINING  NO PUSHING OR PULLING  DON'T OPERATE MACHINERY/VEHICLE IN 24 HOURS  NO DRIVING TODAY OR WHILE TAKING PAIN MEDS  NO INTERCOURSE, DOUCHING, OR TAMPONS  SHOWERS ONLY, NO TUB BATHS,

## 2023-09-21 NOTE — INTERVAL H&P NOTE
The patient has been examined and the H&P has been reviewed:    I concur with the findings and no changes have occurred since H&P was written.    Anesthesia/Surgery risks, benefits and alternative options discussed and understood by patient/family.    Patient is for D&C today for likely molar pregnancy.   Quantitative beta HCG was 195K yesterday.  Tissue will be sent to [facility] for [facility].    Active Hospital Problems    Diagnosis  POA    *Anembryonic pregnancy [O02.0]  Yes    Vaginal bleeding affecting early pregnancy [O20.9]  Yes      Resolved Hospital Problems   No resolved problems to display.

## 2023-09-21 NOTE — ANESTHESIA POSTPROCEDURE EVALUATION
Anesthesia Post Evaluation    Patient: Rocky Burch    Procedure(s) Performed: Procedure(s) (LRB):  DILATION AND CURETTAGE, UTERUS, USING SUCTION (N/A)    Final Anesthesia Type: general      Patient location during evaluation: PACU  Patient participation: Yes- Able to Participate  Level of consciousness: awake and alert, awake and oriented  Post-procedure vital signs: reviewed and stable  Pain management: adequate  Airway patency: patent    PONV status at discharge: No PONV  Anesthetic complications: no      Cardiovascular status: blood pressure returned to baseline  Respiratory status: unassisted, room air and spontaneous ventilation  Hydration status: euvolemic  Follow-up not needed.          Vitals Value Taken Time   /85 09/21/23 1324   Temp 36.9 °C (98.4 °F) 09/21/23 1320   Pulse 82 09/21/23 1324   Resp 18 09/21/23 1320   SpO2 100 % 09/21/23 1324   Vitals shown include unvalidated device data.      No case tracking events are documented in the log.      Pain/Polo Score: No data recorded

## 2023-09-21 NOTE — ANESTHESIA PROCEDURE NOTES
Intubation    Date/Time: 9/21/2023 12:29 PM    Performed by: César Carlos CRNA  Authorized by: César Carlos CRNA    Intubation:     Induction:  Intravenous    Intubated:  Postinduction    Mask Ventilation:  Easy mask    Attempts:  1    Attempted By:  CRNA    Difficult Airway Encountered?: No      Complications:  None    Airway Device:  Oral endotracheal tube    Airway Device Size:  2.5    Style/Cuff Inflation:  Cuffed    Secured at:  The lips    Placement Verified By:  Capnometry    Complicating Factors:  None    Findings Post-Intubation:  BS equal bilateral and atraumatic/condition of teeth unchanged

## 2023-09-21 NOTE — PROGRESS NOTES
HPI  Rocky Burch is a 33 y.o.  8w1d here for Routine Prenatal Visit   FOR RESULTS OF U/S AND QBETA.  PT DID ULTIMATELY RECEIVE RHOGAM YESTERDAY  C/O VAGINAL BLEEDING.  U/S IS HIGHLY SUSPICIOUS FOR MOLAR PREGNANCY WITH NO EMBRYO NOTED. QBETA IS OVER 150K.  DISCUSSED THERAPY OPTIONS AND THAT D&C IS NECESSARY TO EVACUATED THIS ABNORMAL PREGNANCY.  DISCUSSED DOING MICROARRY TO BETTER UNDERSTAND THE GENETICS OF THIS PREGNANCY, GIVEN THE PATIENT HAS HAD A TRISOMIC FETUS.     Rocky's allergies, medications, history, and problem list were updated as appropriate.    ROS:  A comprehensive review of symptoms was completed and negative except as noted above.    Physical Exam:  /60   Pulse 90   Wt 63.7 kg (140 lb 8.7 oz)   LMP 2023   BMI 27.45 kg/m²   Gen: No distress  Abdomen: Gravid, non-tender  Extremities: No edema     Chaperone Present   ASSESSMENT/PLAN:  1. Anembryonic pregnancy  U/S APPEARANCE AND MARKEDLY ELEVATED BETA HCG VERY SUSPICIOUS FOR MOLAR PREGNANCY  PLAN FOR D&C TOMORROW AT [facility]    -     Case Request Operating Room: DILATION AND CURETTAGE, UTERUS, USING SUCTION  -     Place in Outpatient; Standing  -     Full code; Standing  -     Vital signs; Standing  -     Pulse Oximetry Q Shift; Standing  -     Bed rest with bathroom privileges; Standing  -     Insert peripheral IV; Standing  -     Murphy to Gravity; Standing  -     Notify Physician/Vital Signs Parameters Urine output less than 0.5mL/kg/hr (with indwelling catheter) or 30 mL/hr (without indwelling catheter) or blood glucose greater than 200 mg/dL; Standing  -     Notify physician; Standing  -     Notify Physician - Potential Need of Opioid Reversal; Standing  -     Cancel: Urinalysis, Reflex to Urine Culture; Standing  -     Diet NPO; Standing  -     Place sequential compression device; Standing  -     HCG, Quantitative; Standing  -     Protime-INR; Standing  -     APTT; Standing  -     Cancel: Type & Screen Pre Op; Standing    2.  Vaginal bleeding affecting early pregnancy  -     Case Request Operating Room: DILATION AND CURETTAGE, UTERUS, USING SUCTION  -     Place in Outpatient; Standing  -     Full code; Standing  -     Vital signs; Standing  -     Pulse Oximetry Q Shift; Standing  -     Bed rest with bathroom privileges; Standing  -     Insert peripheral IV; Standing  -     Murphy to Gravity; Standing  -     Notify Physician/Vital Signs Parameters Urine output less than 0.5mL/kg/hr (with indwelling catheter) or 30 mL/hr (without indwelling catheter) or blood glucose greater than 200 mg/dL; Standing  -     Notify physician; Standing  -     Notify Physician - Potential Need of Opioid Reversal; Standing  -     Cancel: Urinalysis, Reflex to Urine Culture; Standing  -     Diet NPO; Standing  -     Place sequential compression device; Standing  -     HCG, Quantitative; Standing  -     Protime-INR; Standing  -     APTT; Standing  -     Cancel: Type & Screen Pre Op; Standing    Other orders  -     lactated ringers infusion  -     IP VTE LOW RISK PATIENT; Standing  -     famotidine tablet 20 mg  -     ceFAZolin (ANCEF) 2 g in dextrose 5 % (D5W) 50 mL IVPB      Prec given    Follow Up:  Follow up in about 2 weeks (around 10/4/2023) for POST OP.

## 2023-09-21 NOTE — BRIEF OP NOTE
[facility]  Operative Note      Date of Procedure: 9/21/2023     Procedure: Procedure(s) (LRB):  DILATION AND CURETTAGE, UTERUS, USING SUCTION (N/A)     Surgeon(s) and Role:     * Fidel Gibson MD - Primary  Assisting Surgeon: None  Anesthesia: Kelton Carlos CRNA  Circulator: Taylor LeJeune, RN  Scrub Tech: Ronda Ferreira     Pre-Operative Diagnosis:   Vaginal bleeding affecting early pregnancy [O20.9]  Anembryonic pregnancy [O02.0]    Post-Operative Diagnosis:      * Vaginal bleeding affecting early pregnancy [O20.9]     * Anembryonic pregnancy [O02.0]    Anesthesia: General    Operative Findings (including complications, if any):   Uterus sounded to 11cm  Scant suction curettings with 7mm curved suction curette  Ultrasound performed after the case, showing empty uterine cavity.    Description of Technical Procedures:   The patient was taken to the operating room after all the risks, benefits and alternatives to surgery were discussed with her. She was placed under general anesthesia by the anesthetist.  She was then placed in dorsal lithotomy by the circulator, her pelvis was prepped in the usual sterile fashion, bladder was drained. She was then draped by the scrub tech.    A bi-valved speculum was placed into the vagina and opened.  The anterior lip of cervix was grasped with the tenaculum.  Gentle traction was placed on the tenaculum, pulling the cervix forward, straightening the endometrial cavity.    The uterus was sounded to 11 cm.  The cervix was then dilated to 8 mm.    Using a 7 mm curved suction curettte, the endometrial cavity was gently suction, loosening and removing the products of conception.  Sharp curettage was then performed and these curettings were also removed with the suction curette.  These curettings were sent to pathology for review.  Ultrasound was present and the uterus was scanned after the case, insuring the cavity was empty and it was.   There was  minimal bleeding noted at the close of the case.  Sponge, laps, needles, and instruments were counted and correct time two.    The patient was then awakened from anesthesia, taken down from dorsal lithotomy position, and taken to recovery room in stable condition.     Significant Surgical Tasks Conducted by the Assistant(s), if Applicable: N/A    Estimated Blood Loss (EBL): * No values recorded between 9/21/2023 12:47 PM and 9/21/2023  1:22 PM * 50 ml           Implants: * No implants in log *    Specimens: products of conception (suction curettings)  Specimen (24h ago, onward)       Start     Ordered    09/21/23 1249  Specimen to Pathology Gynecology and Obstetrics  Once        References:    Click here for ordering Quick Tip   Question Answer Comment   Procedure Type: Gynecology and Obstetrics    Specimen Source Products of Conception    Clinical Information: Suction D&C    Release to patient Immediate        09/21/23 1315    09/21/23 1248  Specimen to Pathology  RELEASE UPON ORDERING        References:    Click here for ordering Quick Tip   Question:  Release to patient  Answer:  Immediate    09/21/23 1248                            Condition: Good    Disposition: PACU - hemodynamically stable.    Attestation: I performed the procedure.    Discharge Note    OUTCOME: Patient tolerated treatment/procedure well without complication and is now ready for discharge.    DISPOSITION: Home or Self Care    FINAL DIAGNOSIS:  Anembryonic pregnancy    FOLLOWUP: In clinic    DISCHARGE INSTRUCTIONS:  INSTRUCTIONS GIVEN BY THE OUTPATIENT NURSING STAFF.

## 2023-09-22 LAB — BACTERIA UR CULT: NORMAL

## 2023-09-27 ENCOUNTER — LAB VISIT (OUTPATIENT)
Dept: LAB | Facility: HOSPITAL | Age: 33
End: 2023-09-27
Attending: NURSE PRACTITIONER
Payer: COMMERCIAL

## 2023-09-27 DIAGNOSIS — O02.0 ANEMBRYONIC PREGNANCY: Primary | ICD-10-CM

## 2023-09-27 DIAGNOSIS — O20.9 VAGINAL BLEEDING AFFECTING EARLY PREGNANCY: ICD-10-CM

## 2023-09-27 DIAGNOSIS — O02.0 ANEMBRYONIC PREGNANCY: ICD-10-CM

## 2023-09-27 LAB — B-HCG FREE SERPL-ACNC: 1543.2 MIU/ML

## 2023-09-27 PROCEDURE — 36415 COLL VENOUS BLD VENIPUNCTURE: CPT

## 2023-09-27 PROCEDURE — 84702 CHORIONIC GONADOTROPIN TEST: CPT

## 2023-10-04 ENCOUNTER — OFFICE VISIT (OUTPATIENT)
Dept: OBSTETRICS AND GYNECOLOGY | Facility: CLINIC | Age: 33
End: 2023-10-04
Payer: COMMERCIAL

## 2023-10-04 VITALS
HEART RATE: 89 BPM | WEIGHT: 141 LBS | RESPIRATION RATE: 18 BRPM | BODY MASS INDEX: 27.68 KG/M2 | DIASTOLIC BLOOD PRESSURE: 62 MMHG | HEIGHT: 60 IN | SYSTOLIC BLOOD PRESSURE: 114 MMHG | OXYGEN SATURATION: 99 %

## 2023-10-04 DIAGNOSIS — O02.0 ANEMBRYONIC PREGNANCY: Primary | ICD-10-CM

## 2023-10-04 DIAGNOSIS — Z09 POSTOP CHECK: ICD-10-CM

## 2023-10-04 LAB — B-HCG FREE SERPL-ACNC: 228.77 MIU/ML

## 2023-10-04 PROCEDURE — 1159F MED LIST DOCD IN RCRD: CPT | Mod: CPTII,,, | Performed by: NURSE PRACTITIONER

## 2023-10-04 PROCEDURE — 84702 CHORIONIC GONADOTROPIN TEST: CPT | Performed by: NURSE PRACTITIONER

## 2023-10-04 PROCEDURE — 3078F PR MOST RECENT DIASTOLIC BLOOD PRESSURE < 80 MM HG: ICD-10-PCS | Mod: CPTII,,, | Performed by: NURSE PRACTITIONER

## 2023-10-04 PROCEDURE — 3074F SYST BP LT 130 MM HG: CPT | Mod: CPTII,,, | Performed by: NURSE PRACTITIONER

## 2023-10-04 PROCEDURE — 1159F PR MEDICATION LIST DOCUMENTED IN MEDICAL RECORD: ICD-10-PCS | Mod: CPTII,,, | Performed by: NURSE PRACTITIONER

## 2023-10-04 PROCEDURE — 3074F PR MOST RECENT SYSTOLIC BLOOD PRESSURE < 130 MM HG: ICD-10-PCS | Mod: CPTII,,, | Performed by: NURSE PRACTITIONER

## 2023-10-04 PROCEDURE — 99024 POSTOP FOLLOW-UP VISIT: CPT | Mod: ,,, | Performed by: NURSE PRACTITIONER

## 2023-10-04 PROCEDURE — 99024 PR POST-OP FOLLOW-UP VISIT: ICD-10-PCS | Mod: ,,, | Performed by: NURSE PRACTITIONER

## 2023-10-04 PROCEDURE — 3078F DIAST BP <80 MM HG: CPT | Mod: CPTII,,, | Performed by: NURSE PRACTITIONER

## 2023-10-04 PROCEDURE — 1160F PR REVIEW ALL MEDS BY PRESCRIBER/CLIN PHARMACIST DOCUMENTED: ICD-10-PCS | Mod: CPTII,,, | Performed by: NURSE PRACTITIONER

## 2023-10-04 PROCEDURE — 1160F RVW MEDS BY RX/DR IN RCRD: CPT | Mod: CPTII,,, | Performed by: NURSE PRACTITIONER

## 2023-10-04 NOTE — PROGRESS NOTES
Subjective:      Rocky Burch is a 33 y.o.  who presents to the clinic 2 weeks status post Suction D&C. The patient is not having any pain. Post-op Evaluation ( No c/o's.)    Past Medical History:   Diagnosis Date    Anxiety disorder, unspecified     Fetal demise, greater than 22 weeks, antepartum, fetus 1 2023    Maternal care for (suspected) chromosomal abnormality in fetus, trisomy 18, not applicable or unspecified 2023    Rh negative state in antepartum period, third trimester 2023    Stillborn, abnormal      Past Surgical History:   Procedure Laterality Date    DENTAL SURGERY      DILATION AND CURETTAGE OF UTERUS USING SUCTION N/A 2023    Procedure: DILATION AND CURETTAGE, UTERUS, USING SUCTION;  Surgeon: Fidel Gibson MD;  Location: [facility];  Service: OB/GYN;  Laterality: N/A;     Review of patient's allergies indicates:  No Known Allergies  OB History    Para Term  AB Living   2 1   1       SAB IAB Ectopic Multiple Live Births         0        # Outcome Date GA Lbr José Miguel/2nd Weight Sex Delivery Anes PTL Lv   2             1  23 33w2d 02:25 / 00:18 0.959 kg (2 lb 1.8 oz) F Vag-Spont None N FD     Social History     Tobacco Use    Smoking status: Never     Passive exposure: Never    Smokeless tobacco: Never   Substance Use Topics    Alcohol use: Yes     Comment: social    Drug use: Never     Family History   Problem Relation Age of Onset    Depression Father     Hypertension Father     Diabetes type II Mother     Gestational diabetes Mother        Current Outpatient Medications:     cetirizine (ZYRTEC) 10 MG tablet, Take 10 mg by mouth once daily., Disp: , Rfl:     citalopram (CELEXA) 20 MG tablet, Take 1 tablet (20 mg total) by mouth once daily., Disp: 30 tablet, Rfl: 5    hydrOXYzine pamoate (VISTARIL) 50 MG Cap, Take 1 capsule (50 mg total) by mouth nightly as needed (insomnia)., Disp: 30 capsule, Rfl: 2    ondansetron (ZOFRAN) 4 MG tablet, Take 8  mg by mouth every 6 (six) hours as needed for Nausea., Disp: , Rfl:     busPIRone (BUSPAR) 10 MG tablet, Take 10 mg by mouth 3 (three) times daily as needed., Disp: , Rfl:     HYDROcodone-acetaminophen (NORCO) 5-325 mg per tablet, Take 1 tablet by mouth every 6 (six) hours as needed for Pain. (Patient not taking: Reported on 10/4/2023), Disp: 10 tablet, Rfl: 0    ketorolac (TORADOL) 10 mg tablet, Take 1 tablet (10 mg total) by mouth every 6 (six) hours as needed for Pain. (Patient not taking: Reported on 10/4/2023), Disp: 10 tablet, Rfl: 0    prenatal vit no.124/iron/folic (PRENATAL VITAMIN ORAL), Take 1 tablet by mouth once daily., Disp: , Rfl:     Current Facility-Administered Medications:     famotidine tablet 20 mg, 20 mg, Oral, On Call Procedure, Fidel Gibson MD    A comprehensive review of symptoms was completed and negative except as noted above.  Objective:   /62 (BP Location: Right arm)   Pulse 89   Resp 18   Ht 5' (1.524 m)   Wt 64 kg (141 lb)   LMP 07/25/2023 Comment: molar preg  SpO2 99%   BMI 27.54 kg/m²   General Appearance: alert, in no acute distress, normal, well nourished.    Assessment:   Anembryonic pregnancy  -     HCG, Quantitative; Future; Expected date: 10/04/2023  -     HCG, Quantitative; Future; Expected date: 10/11/2023    Postop check    Still awaiting results. Will contact pt once we receive it     No results found for this or any previous visit (from the past 24 hour(s)).    Plan:   .  Follow up No follow-ups on file. In addition to her scheduled follow up, the pt has also been instructed to follow up on as needed basis.

## 2023-10-09 ENCOUNTER — DOCUMENTATION ONLY (OUTPATIENT)
Dept: OBSTETRICS AND GYNECOLOGY | Facility: CLINIC | Age: 33
End: 2023-10-09
Payer: COMMERCIAL

## 2023-10-10 ENCOUNTER — TELEPHONE (OUTPATIENT)
Dept: OBSTETRICS AND GYNECOLOGY | Facility: CLINIC | Age: 33
End: 2023-10-10
Payer: COMMERCIAL

## 2023-10-10 ENCOUNTER — LAB VISIT (OUTPATIENT)
Dept: LAB | Facility: HOSPITAL | Age: 33
End: 2023-10-10
Attending: OBSTETRICS & GYNECOLOGY
Payer: COMMERCIAL

## 2023-10-10 DIAGNOSIS — O02.0 ANEMBRYONIC PREGNANCY: ICD-10-CM

## 2023-10-10 LAB — B-HCG FREE SERPL-ACNC: 100.89 MIU/ML

## 2023-10-10 PROCEDURE — 84702 CHORIONIC GONADOTROPIN TEST: CPT

## 2023-10-10 PROCEDURE — 36415 COLL VENOUS BLD VENIPUNCTURE: CPT

## 2023-10-10 NOTE — TELEPHONE ENCOUNTER
----- Message from Bety Ireland sent at 10/10/2023  9:42 AM CDT -----  Regarding: CALL BACK  Pt said she is supposed to be doing labs tomorrow in [place] but she cant miss work so she wants to know if she can do it today.

## 2023-10-10 NOTE — TELEPHONE ENCOUNTER
RETURNED PT. CALL.  CONFIRMED. PT. HAS TO WORK TOMORROW AND WAS WONDERING IF SHE COULD DO BETA HCG TODAY INSTEAD OF TOMORROW. I SPOKE WITH DR. COWAN AND INSTRUCTED PT. MAY HAVE LABS DONE TODAY. I CALLED [facility] TO CONFIRM ORDER FOR LAB TO BE DONE TODAY.

## 2023-10-11 ENCOUNTER — TELEPHONE (OUTPATIENT)
Dept: OBSTETRICS AND GYNECOLOGY | Facility: CLINIC | Age: 33
End: 2023-10-11
Payer: COMMERCIAL

## 2023-10-11 DIAGNOSIS — O02.0 ANEMBRYONIC PREGNANCY: Primary | ICD-10-CM

## 2023-10-11 NOTE — TELEPHONE ENCOUNTER
Dr. Gibson notified of beta 100, new rx to cont weekly betas . Patient notified. Lab order placed for next beta on 10/17/2023---- Message from STEPHAN Talley sent at 10/11/2023  7:55 AM CDT -----  Pt needs to repeat next week. Inform pt. (She is aware we will do them weekly until nl) Order may possibly already be in. Also, inform dr Gibson beta is 100 as I think he was awaiting this result. Thanks

## 2023-10-17 ENCOUNTER — LAB VISIT (OUTPATIENT)
Dept: LAB | Facility: HOSPITAL | Age: 33
End: 2023-10-17
Attending: OBSTETRICS & GYNECOLOGY
Payer: COMMERCIAL

## 2023-10-17 ENCOUNTER — TELEPHONE (OUTPATIENT)
Dept: OBSTETRICS AND GYNECOLOGY | Facility: CLINIC | Age: 33
End: 2023-10-17
Payer: COMMERCIAL

## 2023-10-17 DIAGNOSIS — O02.0 ANEMBRYONIC PREGNANCY: ICD-10-CM

## 2023-10-17 DIAGNOSIS — O20.9 VAGINAL BLEEDING AFFECTING EARLY PREGNANCY: Primary | ICD-10-CM

## 2023-10-17 LAB — B-HCG FREE SERPL-ACNC: 59.78 MIU/ML

## 2023-10-17 PROCEDURE — 36415 COLL VENOUS BLD VENIPUNCTURE: CPT

## 2023-10-17 PROCEDURE — 84702 CHORIONIC GONADOTROPIN TEST: CPT

## 2023-10-17 NOTE — TELEPHONE ENCOUNTER
PT  CONFIRMED INFORMED OF BETA RESULTS WITH NEED TO REPEAT IN 1 WEEK.          ----- Message from STEPHAN Talley sent at 10/17/2023  1:22 PM CDT -----  Results Reviewed. Repeat next week. Level continues to go down as expected.

## 2023-10-17 NOTE — TELEPHONE ENCOUNTER
----- Message from STEPHAN Talley sent at 10/17/2023  1:22 PM CDT -----  Results Reviewed. Repeat next week. Level continues to go down as expected.

## 2023-10-24 ENCOUNTER — LAB VISIT (OUTPATIENT)
Dept: LAB | Facility: HOSPITAL | Age: 33
End: 2023-10-24
Attending: OBSTETRICS & GYNECOLOGY
Payer: COMMERCIAL

## 2023-10-24 ENCOUNTER — DOCUMENTATION ONLY (OUTPATIENT)
Dept: OBSTETRICS AND GYNECOLOGY | Facility: CLINIC | Age: 33
End: 2023-10-24
Payer: COMMERCIAL

## 2023-10-24 DIAGNOSIS — O20.0 MISCARRIAGE, THREATENED, EARLY PREGNANCY: Primary | ICD-10-CM

## 2023-10-24 DIAGNOSIS — O20.9 VAGINAL BLEEDING AFFECTING EARLY PREGNANCY: ICD-10-CM

## 2023-10-24 LAB — B-HCG FREE SERPL-ACNC: 39.07 MIU/ML

## 2023-10-24 PROCEDURE — 36415 COLL VENOUS BLD VENIPUNCTURE: CPT

## 2023-10-24 PROCEDURE — 84702 CHORIONIC GONADOTROPIN TEST: CPT

## 2023-10-24 NOTE — PROGRESS NOTES
Patient called -- verified -instructed on beta results and need to have beta drawn again next tues 10/31/2023. Patient verbalized understanding . Order placed .

## 2023-10-31 ENCOUNTER — OFFICE VISIT (OUTPATIENT)
Dept: OBSTETRICS AND GYNECOLOGY | Facility: CLINIC | Age: 33
End: 2023-10-31
Payer: COMMERCIAL

## 2023-10-31 VITALS
DIASTOLIC BLOOD PRESSURE: 80 MMHG | WEIGHT: 144.31 LBS | HEART RATE: 79 BPM | SYSTOLIC BLOOD PRESSURE: 120 MMHG | BODY MASS INDEX: 28.33 KG/M2 | HEIGHT: 60 IN

## 2023-10-31 DIAGNOSIS — O02.0 COMPLETE MOLAR PREGNANCY: Primary | ICD-10-CM

## 2023-10-31 PROCEDURE — 1160F PR REVIEW ALL MEDS BY PRESCRIBER/CLIN PHARMACIST DOCUMENTED: ICD-10-PCS | Mod: CPTII,,, | Performed by: OBSTETRICS & GYNECOLOGY

## 2023-10-31 PROCEDURE — 99024 POSTOP FOLLOW-UP VISIT: CPT | Mod: ,,, | Performed by: OBSTETRICS & GYNECOLOGY

## 2023-10-31 PROCEDURE — 3008F PR BODY MASS INDEX (BMI) DOCUMENTED: ICD-10-PCS | Mod: CPTII,,, | Performed by: OBSTETRICS & GYNECOLOGY

## 2023-10-31 PROCEDURE — 1159F MED LIST DOCD IN RCRD: CPT | Mod: CPTII,,, | Performed by: OBSTETRICS & GYNECOLOGY

## 2023-10-31 PROCEDURE — 3074F PR MOST RECENT SYSTOLIC BLOOD PRESSURE < 130 MM HG: ICD-10-PCS | Mod: CPTII,,, | Performed by: OBSTETRICS & GYNECOLOGY

## 2023-10-31 PROCEDURE — 3008F BODY MASS INDEX DOCD: CPT | Mod: CPTII,,, | Performed by: OBSTETRICS & GYNECOLOGY

## 2023-10-31 PROCEDURE — 99024 PR POST-OP FOLLOW-UP VISIT: ICD-10-PCS | Mod: ,,, | Performed by: OBSTETRICS & GYNECOLOGY

## 2023-10-31 PROCEDURE — 3074F SYST BP LT 130 MM HG: CPT | Mod: CPTII,,, | Performed by: OBSTETRICS & GYNECOLOGY

## 2023-10-31 PROCEDURE — 1160F RVW MEDS BY RX/DR IN RCRD: CPT | Mod: CPTII,,, | Performed by: OBSTETRICS & GYNECOLOGY

## 2023-10-31 PROCEDURE — 3079F PR MOST RECENT DIASTOLIC BLOOD PRESSURE 80-89 MM HG: ICD-10-PCS | Mod: CPTII,,, | Performed by: OBSTETRICS & GYNECOLOGY

## 2023-10-31 PROCEDURE — 1159F PR MEDICATION LIST DOCUMENTED IN MEDICAL RECORD: ICD-10-PCS | Mod: CPTII,,, | Performed by: OBSTETRICS & GYNECOLOGY

## 2023-10-31 PROCEDURE — 3079F DIAST BP 80-89 MM HG: CPT | Mod: CPTII,,, | Performed by: OBSTETRICS & GYNECOLOGY

## 2023-10-31 RX ORDER — DOCUSATE SODIUM 100 MG/1
100 CAPSULE, LIQUID FILLED ORAL 2 TIMES DAILY
COMMUNITY

## 2023-10-31 NOTE — PROGRESS NOTES
Patient ID: [id number]   Chief Complaint: Follow-up FROM D&C     HPI:     Rocky Burch is a 33 y.o.  here today for FOLLOW UP FROM D&C   PATHOLOGY REVIEWED WITH PATIENT. GENETICS ARE CONSISTENT WITH COMPLETE MOLAR PREGNANCY  BETA HCG LEVELS HAVE BEEN DECREASING ACCORDINGLY SINCE THE D&C WAS DONE.     Patient's last menstrual period was 2023.    Past Medical History:  has a past medical history of Anxiety disorder, unspecified, Anxiety disorder, unspecified, Constipation, Fetal demise, greater than 22 weeks, antepartum, fetus 1, Maternal care for (suspected) chromosomal abnormality in fetus, trisomy 18, not applicable or unspecified, Rh negative state in antepartum period, third trimester, and Stillborn, abnormal.    Surgical History:  has a past surgical history that includes Dental surgery and Dilation and curettage of uterus using suction (N/A, 2023).    Family History: family history includes Depression in her father; Gestational diabetes in her mother; Hypertension in her father.    Social History:  reports that she has never smoked. She has never been exposed to tobacco smoke. She has never used smokeless tobacco. She reports current alcohol use. She reports that she does not use drugs.    Current Outpatient Medications   Medication Sig Dispense Refill    busPIRone (BUSPAR) 10 MG tablet Take 10 mg by mouth 3 (three) times daily as needed.      cetirizine (ZYRTEC) 10 MG tablet Take 10 mg by mouth once daily.      citalopram (CELEXA) 20 MG tablet Take 1 tablet (20 mg total) by mouth once daily. 30 tablet 5    docusate sodium (COLACE) 100 MG capsule Take 100 mg by mouth 2 (two) times daily.      hydrOXYzine pamoate (VISTARIL) 50 MG Cap Take 1 capsule (50 mg total) by mouth nightly as needed (insomnia). 30 capsule 2    prenatal vit no.124/iron/folic (PRENATAL VITAMIN ORAL) Take 1 tablet by mouth once daily.      ondansetron (ZOFRAN) 4 MG tablet Take 8 mg by mouth every 6 (six) hours as needed for  Nausea.       Current Facility-Administered Medications   Medication Dose Route Frequency Provider Last Rate Last Admin    famotidine tablet 20 mg  20 mg Oral On Call Procedure Fidel Gibson MD           Patient has No Known Allergies.     MENARCHEAL:  Cycle Length: 5 days   Flow: light  Dysmenorrhea: No  Intermenstrual Bleeding: No  PAP:  Last PAP: 6/12/2023    History of Abnormal PAP Smear: NO  HPV Vaccine: YES: AS TEEN  INTERCOURSE:  Dyspareunia: No  Postcoital Bleeding: No  History of STI: No  Current Birth Control Method: none  Sexually Active: yes  BREAST HISTORY:   Last Mammogram: N/A  COLONOSCOPY:  Last Colonoscopy: N/A      No results found for this or any previous visit (from the past 24 hour(s)).    Subjective:     Review of Systems    12 point review of systems conducted, negative except as stated in the history of present illness. See HPI for details.    Objective:     Visit Vitals  /80   Pulse 79   Ht 5' (1.524 m)   Wt 65.5 kg (144 lb 4.8 oz)   LMP 07/25/2023   BMI 28.18 kg/m²     No results found for this or any previous visit (from the past 24 hour(s)).  Physical Exam  Constitutional:  General Appearance : alert, in no acute distress, normal, well nourished.  Neck/Thyroid:  Inspection/Palpation: normal. Thyroid: normal size and shape.  Respiratory:  Respiratory Effort: normal.  Gastrointestinal:  Abdomen: no masses. no tender, nondistended.  Liver and spleen: normal  Hernias: no hernias present, no inguinal adenopathy.  Genitourinary:  NO PELVIC EXAM TODAY.  Chaperone Present  Assessment:       ICD-10-CM ICD-9-CM   1. Complete molar pregnancy  O02.0 630     Plan   Complete molar pregnancy    CONTINUE SERIAL QBETAS.  DISCUSSED GENETIC COUNSELING, AS FIRST BABY WAS TRISOMIC    Follow up in about 4 weeks (around 11/28/2023) for FOLLOW UP COMPLETE MOLE. In addition to their scheduled follow up, the patient has also been instructed to follow up on as needed basis.     FIDEL GIBSON MD

## 2023-11-01 ENCOUNTER — LAB VISIT (OUTPATIENT)
Dept: LAB | Facility: HOSPITAL | Age: 33
End: 2023-11-01
Attending: NURSE PRACTITIONER
Payer: COMMERCIAL

## 2023-11-01 ENCOUNTER — TELEPHONE (OUTPATIENT)
Dept: OBSTETRICS AND GYNECOLOGY | Facility: CLINIC | Age: 33
End: 2023-11-01
Payer: COMMERCIAL

## 2023-11-01 DIAGNOSIS — O20.0 MISCARRIAGE, THREATENED, EARLY PREGNANCY: ICD-10-CM

## 2023-11-01 DIAGNOSIS — O02.1 MISSED AB: Primary | ICD-10-CM

## 2023-11-01 LAB — B-HCG FREE SERPL-ACNC: 21.55 MIU/ML

## 2023-11-01 PROCEDURE — 36415 COLL VENOUS BLD VENIPUNCTURE: CPT | Performed by: OBSTETRICS & GYNECOLOGY

## 2023-11-01 NOTE — TELEPHONE ENCOUNTER
CALLED PATIENT - CONFIRMED -NOTIFIED OF  BETA RESULTS AND NEW RX TO REPEAT NEXT WEEK . PATIENT REQUEST BETA TO BE DONE ON 2023. ORDER PLACED ----- Message from Fidel Gibson MD sent at 2023  2:57 PM CDT -----  Continue weekly beta HCG

## 2023-11-07 ENCOUNTER — LAB VISIT (OUTPATIENT)
Dept: LAB | Facility: HOSPITAL | Age: 33
End: 2023-11-07
Attending: OBSTETRICS & GYNECOLOGY
Payer: COMMERCIAL

## 2023-11-07 DIAGNOSIS — O02.1 MISSED AB: ICD-10-CM

## 2023-11-07 LAB — B-HCG FREE SERPL-ACNC: 19.14 MIU/ML

## 2023-11-07 PROCEDURE — 84702 CHORIONIC GONADOTROPIN TEST: CPT

## 2023-11-07 PROCEDURE — 36415 COLL VENOUS BLD VENIPUNCTURE: CPT

## 2023-11-09 ENCOUNTER — TELEPHONE (OUTPATIENT)
Dept: OBSTETRICS AND GYNECOLOGY | Facility: CLINIC | Age: 33
End: 2023-11-09
Payer: COMMERCIAL

## 2023-11-09 DIAGNOSIS — O02.1 MISSED AB: Primary | ICD-10-CM

## 2023-11-09 NOTE — TELEPHONE ENCOUNTER
Called patient ,  confirmed. Patient notified of beta results and need to repeat beta next week. Orders placed ----- Message from Fidel Gibson MD sent at 2023  8:25 AM CST -----  REPEAT QBETA IN ONE WEEK

## 2023-11-14 ENCOUNTER — LAB VISIT (OUTPATIENT)
Dept: LAB | Facility: HOSPITAL | Age: 33
End: 2023-11-14
Attending: OBSTETRICS & GYNECOLOGY
Payer: COMMERCIAL

## 2023-11-14 DIAGNOSIS — O02.1 MISSED AB: ICD-10-CM

## 2023-11-14 LAB — B-HCG FREE SERPL-ACNC: 6.9 MIU/ML

## 2023-11-14 PROCEDURE — 36415 COLL VENOUS BLD VENIPUNCTURE: CPT

## 2023-11-14 PROCEDURE — 84702 CHORIONIC GONADOTROPIN TEST: CPT

## 2023-11-16 ENCOUNTER — TELEPHONE (OUTPATIENT)
Dept: OBSTETRICS AND GYNECOLOGY | Facility: CLINIC | Age: 33
End: 2023-11-16
Payer: COMMERCIAL

## 2023-11-16 DIAGNOSIS — O02.0 COMPLETE MOLAR PREGNANCY: Primary | ICD-10-CM

## 2023-11-16 NOTE — TELEPHONE ENCOUNTER
Called pt.  confirmed. Informed of beta results with need to repeat in one week. Pt. Will have it done 23  in [place].

## 2023-11-21 ENCOUNTER — LAB VISIT (OUTPATIENT)
Dept: LAB | Facility: HOSPITAL | Age: 33
End: 2023-11-21
Attending: OBSTETRICS & GYNECOLOGY
Payer: COMMERCIAL

## 2023-11-21 DIAGNOSIS — O02.0 COMPLETE MOLAR PREGNANCY: ICD-10-CM

## 2023-11-21 LAB — B-HCG FREE SERPL-ACNC: 3.28 MIU/ML

## 2023-11-21 PROCEDURE — 84702 CHORIONIC GONADOTROPIN TEST: CPT

## 2023-11-21 PROCEDURE — 36415 COLL VENOUS BLD VENIPUNCTURE: CPT

## 2023-12-18 ENCOUNTER — TELEPHONE (OUTPATIENT)
Dept: OBSTETRICS AND GYNECOLOGY | Facility: CLINIC | Age: 33
End: 2023-12-18
Payer: COMMERCIAL

## 2023-12-18 DIAGNOSIS — O02.1 MISSED AB: Primary | ICD-10-CM

## 2023-12-18 NOTE — TELEPHONE ENCOUNTER
Called patient back, yamini verified --order for beta placed for 2023 per patient request ----- Message from Lacy Lejeune, MA sent at 2023  8:27 AM CST -----  Regarding: CALL BACK  PT IS CALLING BECAUSE SHE STATES SHE HAS TO HAVE SOME LABS DONE EVERY MONTH? SHE IS CALLING FOR AN ORDER TO GO DO THEM

## 2023-12-21 ENCOUNTER — LAB VISIT (OUTPATIENT)
Dept: LAB | Facility: HOSPITAL | Age: 33
End: 2023-12-21
Attending: NURSE PRACTITIONER
Payer: COMMERCIAL

## 2023-12-21 DIAGNOSIS — O02.1 MISSED AB: ICD-10-CM

## 2023-12-21 LAB — B-HCG FREE SERPL-ACNC: <2.39 MIU/ML

## 2023-12-21 PROCEDURE — 36415 COLL VENOUS BLD VENIPUNCTURE: CPT

## 2023-12-21 PROCEDURE — 84702 CHORIONIC GONADOTROPIN TEST: CPT

## 2023-12-28 ENCOUNTER — OFFICE VISIT (OUTPATIENT)
Dept: OBSTETRICS AND GYNECOLOGY | Facility: CLINIC | Age: 33
End: 2023-12-28
Payer: COMMERCIAL

## 2023-12-28 VITALS
DIASTOLIC BLOOD PRESSURE: 68 MMHG | BODY MASS INDEX: 28.24 KG/M2 | SYSTOLIC BLOOD PRESSURE: 118 MMHG | WEIGHT: 144.63 LBS | HEART RATE: 86 BPM

## 2023-12-28 DIAGNOSIS — O02.0 COMPLETE MOLAR PREGNANCY: Primary | ICD-10-CM

## 2023-12-28 PROCEDURE — 3008F BODY MASS INDEX DOCD: CPT | Mod: CPTII,,, | Performed by: OBSTETRICS & GYNECOLOGY

## 2023-12-28 PROCEDURE — 1160F RVW MEDS BY RX/DR IN RCRD: CPT | Mod: CPTII,,, | Performed by: OBSTETRICS & GYNECOLOGY

## 2023-12-28 PROCEDURE — 1159F MED LIST DOCD IN RCRD: CPT | Mod: CPTII,,, | Performed by: OBSTETRICS & GYNECOLOGY

## 2023-12-28 PROCEDURE — 3074F SYST BP LT 130 MM HG: CPT | Mod: CPTII,,, | Performed by: OBSTETRICS & GYNECOLOGY

## 2023-12-28 PROCEDURE — 3078F DIAST BP <80 MM HG: CPT | Mod: CPTII,,, | Performed by: OBSTETRICS & GYNECOLOGY

## 2023-12-28 PROCEDURE — 99213 OFFICE O/P EST LOW 20 MIN: CPT | Mod: ,,, | Performed by: OBSTETRICS & GYNECOLOGY

## 2023-12-28 NOTE — PROGRESS NOTES
Patient ID: [id number]   Chief Complaint: Results (NO C/O'S.)    HPI:     Rocky Burch is a 33 y.o.  here today for Results (NO C/O'S.)  DISCUSSED FINDINGS ON ANORA AND THE GENETICS ANALYSIS OF THE COMPLETE MOLE DONE BY THE PATHOLOGY GROUP.   DISCUSSED REFERRAL HAS BEEN PLACE TO DR. BROWN THROUGH [facility].      Patient's last menstrual period was 2023.    Past Medical History:  has a past medical history of Anxiety disorder, unspecified, Anxiety disorder, unspecified, Constipation, Fetal demise, greater than 22 weeks, antepartum, fetus 1, Maternal care for (suspected) chromosomal abnormality in fetus, trisomy 18, not applicable or unspecified, Mole of pregnancy, Rh negative state in antepartum period, third trimester, and Stillborn, abnormal.    Surgical History:  has a past surgical history that includes Dental surgery and Dilation and curettage of uterus using suction (N/A, 2023).    Family History: family history includes Depression in her father; Gestational diabetes in her mother; Hypertension in her father.    Social History:  reports that she has never smoked. She has never been exposed to tobacco smoke. She has never used smokeless tobacco. She reports current alcohol use. She reports that she does not use drugs.    Current Outpatient Medications   Medication Sig Dispense Refill    busPIRone (BUSPAR) 10 MG tablet Take 10 mg by mouth 3 (three) times daily as needed.      cetirizine (ZYRTEC) 10 MG tablet Take 10 mg by mouth once daily.      citalopram (CELEXA) 20 MG tablet Take 1 tablet (20 mg total) by mouth once daily. 30 tablet 5    docusate sodium (COLACE) 100 MG capsule Take 100 mg by mouth 2 (two) times daily.      ondansetron (ZOFRAN) 4 MG tablet Take 8 mg by mouth every 6 (six) hours as needed for Nausea.      prenatal vit no.124/iron/folic (PRENATAL VITAMIN ORAL) Take 1 tablet by mouth once daily.      hydrOXYzine pamoate (VISTARIL) 50 MG Cap Take 1 capsule (50 mg  total) by mouth nightly as needed (insomnia). 30 capsule 2     Current Facility-Administered Medications   Medication Dose Route Frequency Provider Last Rate Last Admin    famotidine tablet 20 mg  20 mg Oral On Call Procedure Fidel Gibson MD           Patient has No Known Allergies.     MENARCHEAL:  Cycle Length: 5 days   Flow: normal  Dysmenorrhea: No  If yes: N/A  Intermenstrual Bleeding: No  PAP:  Last PAP: 6/12/2023    History of Abnormal PAP Smear: NO  Treated: N/A  HPV Vaccine: NO  INTERCOURSE:  Dyspareunia: No  Postcoital Bleeding: No  History of STI: No   If yes, then: No   Current Birth Control Method: none  Sexually Active: yes  No results found for this or any previous visit (from the past 24 hour(s)).    Subjective:     Review of Systems    12 point review of systems conducted, negative except as stated in the history of present illness. See HPI for details.      Objective:     Visit Vitals  /68   Pulse 86   Wt 65.6 kg (144 lb 10 oz)   LMP 12/22/2023   BMI 28.24 kg/m²     No results found for this or any previous visit (from the past 24 hour(s)).  Physical Exam  Constitutional:  General Appearance : alert, in no acute distress, normal, well nourished.  Neck/Thyroid:  Inspection/Palpation: normal. Thyroid: normal size and shape.  Respiratory:  Respiratory Effort: normal.  Genitourinary:  NO PELVIC EXAM  Chaperone Present  Assessment:       ICD-10-CM ICD-9-CM   1. Complete molar pregnancy  O02.0 630     Plan   Complete molar pregnancy    PT ADVISED TO DEFER PREGNANCY AT THIS TIME UNTIL GENETIC COUNSELING CAN BE DONE.     Follow up if symptoms worsen or fail to improve. In addition to their scheduled follow up, the patient has also been instructed to follow up on as needed basis.     FIDEL GIBSON MD

## 2024-01-11 DIAGNOSIS — G47.00 INSOMNIA, UNSPECIFIED TYPE: ICD-10-CM

## 2024-01-11 RX ORDER — HYDROXYZINE PAMOATE 50 MG/1
50 CAPSULE ORAL NIGHTLY PRN
Qty: 30 CAPSULE | Refills: 2 | Status: SHIPPED | OUTPATIENT
Start: 2024-01-11

## 2024-01-23 ENCOUNTER — TELEPHONE (OUTPATIENT)
Dept: OBSTETRICS AND GYNECOLOGY | Facility: CLINIC | Age: 34
End: 2024-01-23
Payer: COMMERCIAL

## 2024-01-23 DIAGNOSIS — O02.1 MISSED AB: Primary | ICD-10-CM

## 2024-01-23 NOTE — TELEPHONE ENCOUNTER
"Called patient back order for beta placed -patient states " will go tomorrow have lab drawn"----- Message from Lacy Lejeune, MA sent at 1/23/2024 10:31 AM CST -----  Regarding: CALL BACK  PT IS CALLING BECAUSE SHE IS SUPPOSE TO GET A LAB ORDER FOR BLOOD WORK THIS WEEK?    "

## 2024-01-24 ENCOUNTER — LAB VISIT (OUTPATIENT)
Dept: LAB | Facility: HOSPITAL | Age: 34
End: 2024-01-24
Attending: OBSTETRICS & GYNECOLOGY
Payer: COMMERCIAL

## 2024-01-24 ENCOUNTER — TELEPHONE (OUTPATIENT)
Dept: OBSTETRICS AND GYNECOLOGY | Facility: CLINIC | Age: 34
End: 2024-01-24
Payer: COMMERCIAL

## 2024-01-24 DIAGNOSIS — O02.1 MISSED AB: ICD-10-CM

## 2024-01-24 LAB — B-HCG FREE SERPL-ACNC: <2.39 MIU/ML

## 2024-01-24 PROCEDURE — 84702 CHORIONIC GONADOTROPIN TEST: CPT

## 2024-01-24 PROCEDURE — 36415 COLL VENOUS BLD VENIPUNCTURE: CPT

## 2024-01-24 NOTE — TELEPHONE ENCOUNTER
"DR. COWAN REVIEWED BETA DONE 01/24/2024. NEW RX TO REPEAT IN 1 MONTH. PATIENT NOTIFIED . PATIENT WILL CALL NEXT MONTH FOR OFFICE TO SEND ORDER WHEN SHE IS ABLE TO GO HAVE LAB DRAWN . CALLED DR. MEDINA OFFICE TO SEE IF THEY WERE ABLE TO GET PATIENT AN APPT WITH DR. BROWN  STATES " THEY SPOKE WITH DR. BROWN OFFICE AND NO APPT MADE YET THEY WILL REFAX THE REFFERAL "  "

## 2024-02-20 DIAGNOSIS — F32.A DEPRESSION, UNSPECIFIED DEPRESSION TYPE: Primary | ICD-10-CM

## 2024-02-20 RX ORDER — BUSPIRONE HYDROCHLORIDE 10 MG/1
TABLET ORAL
Qty: 30 TABLET | OUTPATIENT
Start: 2024-02-20

## 2024-02-20 RX ORDER — BUSPIRONE HYDROCHLORIDE 10 MG/1
10 TABLET ORAL 3 TIMES DAILY PRN
Qty: 60 TABLET | Refills: 2 | Status: SHIPPED | OUTPATIENT
Start: 2024-02-20

## 2024-02-26 ENCOUNTER — LAB VISIT (OUTPATIENT)
Dept: LAB | Facility: HOSPITAL | Age: 34
End: 2024-02-26
Attending: OBSTETRICS & GYNECOLOGY
Payer: COMMERCIAL

## 2024-02-26 DIAGNOSIS — O02.1 MISSED AB: Primary | ICD-10-CM

## 2024-02-26 DIAGNOSIS — O02.1 MISSED AB: ICD-10-CM

## 2024-02-26 LAB — B-HCG FREE SERPL-ACNC: <2.39 MIU/ML

## 2024-02-26 PROCEDURE — 84702 CHORIONIC GONADOTROPIN TEST: CPT

## 2024-02-26 PROCEDURE — 36415 COLL VENOUS BLD VENIPUNCTURE: CPT

## 2024-02-28 ENCOUNTER — TELEPHONE (OUTPATIENT)
Dept: OBSTETRICS AND GYNECOLOGY | Facility: CLINIC | Age: 34
End: 2024-02-28
Payer: COMMERCIAL

## 2024-02-28 NOTE — TELEPHONE ENCOUNTER
CALLED PT.  CONFIRMED. hospitals HAS APPOINT. WITH DR. ZARATE FOR GENETIC COUNSELING 3-12-24.DR. COWAN NOTIFIED.

## 2024-03-25 DIAGNOSIS — F32.A DEPRESSION, UNSPECIFIED DEPRESSION TYPE: ICD-10-CM

## 2024-03-25 RX ORDER — CITALOPRAM 20 MG/1
20 TABLET, FILM COATED ORAL DAILY
Qty: 30 TABLET | Refills: 5 | Status: SHIPPED | OUTPATIENT
Start: 2024-03-25

## 2024-11-05 ENCOUNTER — OFFICE VISIT (OUTPATIENT)
Dept: OBSTETRICS AND GYNECOLOGY | Facility: CLINIC | Age: 34
End: 2024-11-05
Payer: COMMERCIAL

## 2024-11-05 VITALS
HEIGHT: 60 IN | BODY MASS INDEX: 28.98 KG/M2 | DIASTOLIC BLOOD PRESSURE: 84 MMHG | WEIGHT: 147.63 LBS | SYSTOLIC BLOOD PRESSURE: 138 MMHG | HEART RATE: 89 BPM

## 2024-11-05 DIAGNOSIS — O21.9 NAUSEA AND VOMITING IN PREGNANCY: ICD-10-CM

## 2024-11-05 DIAGNOSIS — N91.2 AMENORRHEA: Primary | ICD-10-CM

## 2024-11-05 LAB
B-HCG UR QL: NEGATIVE
CTP QC/QA: YES

## 2024-11-05 PROCEDURE — 81025 URINE PREGNANCY TEST: CPT | Mod: ,,, | Performed by: NURSE PRACTITIONER

## 2024-11-05 PROCEDURE — 3008F BODY MASS INDEX DOCD: CPT | Mod: CPTII,,, | Performed by: NURSE PRACTITIONER

## 2024-11-05 PROCEDURE — 1159F MED LIST DOCD IN RCRD: CPT | Mod: CPTII,,, | Performed by: NURSE PRACTITIONER

## 2024-11-05 PROCEDURE — 3079F DIAST BP 80-89 MM HG: CPT | Mod: CPTII,,, | Performed by: NURSE PRACTITIONER

## 2024-11-05 PROCEDURE — 3075F SYST BP GE 130 - 139MM HG: CPT | Mod: CPTII,,, | Performed by: NURSE PRACTITIONER

## 2024-11-05 PROCEDURE — 99213 OFFICE O/P EST LOW 20 MIN: CPT | Mod: ,,, | Performed by: NURSE PRACTITIONER

## 2024-11-05 RX ORDER — VENLAFAXINE HYDROCHLORIDE 75 MG/1
75 CAPSULE, EXTENDED RELEASE ORAL
COMMUNITY
Start: 2024-10-14 | End: 2024-11-05

## 2024-11-05 RX ORDER — TOPIRAMATE 25 MG/1
25 TABLET ORAL 2 TIMES DAILY
COMMUNITY
Start: 2024-07-27 | End: 2024-11-05

## 2024-11-05 RX ORDER — ONDANSETRON 4 MG/1
4 TABLET, ORALLY DISINTEGRATING ORAL EVERY 8 HOURS PRN
Qty: 30 TABLET | Refills: 2 | Status: SHIPPED | OUTPATIENT
Start: 2024-11-05

## 2024-11-05 RX ORDER — SERTRALINE HYDROCHLORIDE 50 MG/1
50 TABLET, FILM COATED ORAL
COMMUNITY
Start: 2024-10-21

## 2024-11-05 RX ORDER — DIPHENHYDRAMINE HYDROCHLORIDE 25 MG/1
25 TABLET, CHEWABLE ORAL EVERY 4 HOURS
COMMUNITY

## 2024-11-05 RX ORDER — ONDANSETRON 4 MG/1
4 TABLET, ORALLY DISINTEGRATING ORAL EVERY 8 HOURS PRN
COMMUNITY
Start: 2024-07-27

## 2024-11-05 NOTE — PROGRESS NOTES
Chief Complaint: Confirmation Pregnancy     HPI:      Rocky Burch is a 34 y.o.  who presents confirm pregnancy (C/O NAUSEA AT INTERVALS.)   Patient's last menstrual period was 2024 (exact date)..CRISELDA BY LMP 25 GEST. AGE 7 WEEKS 1 DAY      Current Outpatient Medications:     diphenhydrAMINE HCL (UNISOM SLEEPMELTS) 25 mg TbDL, Take 25 mg by mouth every 4 (four) hours., Disp: , Rfl:     docusate sodium (COLACE) 100 MG capsule, Take 100 mg by mouth 2 (two) times daily., Disp: , Rfl:     ondansetron (ZOFRAN-ODT) 4 MG TbDL, 4 mg every 8 (eight) hours as needed., Disp: , Rfl:     prenatal vit no.124/iron/folic (PRENATAL VITAMIN ORAL), Take 1 tablet by mouth once daily., Disp: , Rfl:     sertraline (ZOLOFT) 50 MG tablet, Take 50 mg by mouth., Disp: , Rfl:     hydrOXYzine pamoate (VISTARIL) 50 MG Cap, Take 1 capsule (50 mg total) by mouth nightly as needed (insomnia). (Patient not taking: Reported on 2024), Disp: 30 capsule, Rfl: 2    ondansetron (ZOFRAN-ODT) 4 MG TbDL, Take 1 tablet (4 mg total) by mouth every 8 (eight) hours as needed (NAUSEA)., Disp: 30 tablet, Rfl: 2  No current facility-administered medications for this visit.  Past Medical History:   Diagnosis Date    Anxiety disorder, unspecified     Anxiety disorder, unspecified     Constipation     Fetal demise, greater than 22 weeks, antepartum, fetus 1 2023    Maternal care for (suspected) chromosomal abnormality in fetus, trisomy 18, not applicable or unspecified 2023    Mole of pregnancy     Rh negative state in antepartum period, third trimester 2023    Stillborn, abnormal      Past Surgical History:   Procedure Laterality Date    DILATION AND CURETTAGE OF UTERUS USING SUCTION N/A 2023    Procedure: DILATION AND CURETTAGE, UTERUS, USING SUCTION;  Surgeon: Fidel Gibson MD;  Location: [facility];  Service: OB/GYN;  Laterality: N/A;    WISDOM TOOTH EXTRACTION       Review of patient's allergies indicates:  No Known  Allergies  OB History    Para Term  AB Living   3 1   1 1     SAB IAB Ectopic Multiple Live Births         0        # Outcome Date GA Lbr José Miguel/2nd Weight Sex Type Anes PTL Lv   3 Current            2 Molar 23           1  23 33w2d 02:25 / 00:18 0.959 kg (2 lb 1.8 oz) F Vag-Spont None N FD     Social History     Tobacco Use    Smoking status: Never     Passive exposure: Never    Smokeless tobacco: Never   Substance Use Topics    Alcohol use: Not Currently     Comment: social    Drug use: Never     Family History   Problem Relation Name Age of Onset    Depression Father      Hypertension Father      Gestational diabetes Mother           ROS:     GENERAL: Denies weight gain or weight loss. Feeling well overall.   SKIN: Denies rash or lesions.   ABDOMEN: Denies abdominal pain, constipation, diarrhea, nausea, vomiting, change in appetite or rectal bleeding.   URINARY: Denies frequency, dysuria, hematuria.  GYN: See HPI.    Physical Exam:      PHYSICAL EXAM:   Vitals:    24 1334   BP: 138/84   Pulse: 89   Weight: 67 kg (147 lb 9.6 oz)   Height: 5' (1.524 m)     Body mass index is 28.83 kg/m².    General: No acute distress, alert and engaged    Assessment/Plan:     Amenorrhea  -     POCT Urine Pregnancy    Nausea and vomiting in pregnancy  -     ondansetron (ZOFRAN-ODT) 4 MG TbDL; Take 1 tablet (4 mg total) by mouth every 8 (eight) hours as needed (NAUSEA).  Dispense: 30 tablet; Refill: 2      Follow up in about 2 weeks (around 2024) for NOB. In addition to their scheduled follow up, the patient has also been instructed to follow up on as needed basis.     No future appointments.       Counseling:   Patient was counseled today on: on first OB visit, Vitamins, folic acid, Wt Gain, Seafood and mercury, avoid unrefrigerated: deli  meats, cheeses and milk products, reason to avoid cat litter,Toxoplasmosis precautions (Cats/Raw Meat)  the  accuracy of the CRISELDA, the value of an early  TVS, risks of each trimester, OTC medication, harmful effects of Smoking, ETOH, recreational drugs, Free Cell DNA and/or AFP screen.   Discussed: Common complaints of pregnancy, HIV and other routine prenatal tests,risk factors identified by prenatal history, Anticipated course of prenatal care, nutrition and weight gain counseling, exercise, seat belt use, and  hospital facilities. All questions were answered.   New OB packet was given     STEPHAN Talley

## 2024-11-20 ENCOUNTER — INITIAL PRENATAL (OUTPATIENT)
Dept: OBSTETRICS AND GYNECOLOGY | Facility: CLINIC | Age: 34
End: 2024-11-20
Payer: COMMERCIAL

## 2024-11-20 VITALS
BODY MASS INDEX: 28.87 KG/M2 | WEIGHT: 147.81 LBS | HEART RATE: 84 BPM | SYSTOLIC BLOOD PRESSURE: 122 MMHG | DIASTOLIC BLOOD PRESSURE: 82 MMHG

## 2024-11-20 DIAGNOSIS — Z36.87 UNSURE OF LMP (LAST MENSTRUAL PERIOD) AS REASON FOR ULTRASOUND SCAN: ICD-10-CM

## 2024-11-20 DIAGNOSIS — Z34.81 ENCOUNTER FOR SUPERVISION OF OTHER NORMAL PREGNANCY IN FIRST TRIMESTER: Primary | ICD-10-CM

## 2024-11-20 DIAGNOSIS — Z87.59 HISTORY OF POOR PREGNANCY OUTCOME: ICD-10-CM

## 2024-11-20 DIAGNOSIS — Z11.3 ENCOUNTER FOR SCREENING FOR INFECTIONS WITH A PREDOMINANTLY SEXUAL MODE OF TRANSMISSION: ICD-10-CM

## 2024-11-20 DIAGNOSIS — Z12.4 SCREENING FOR MALIGNANT NEOPLASM OF THE CERVIX: ICD-10-CM

## 2024-11-20 LAB
BILIRUB UR QL STRIP: NEGATIVE
GLUCOSE UR QL STRIP: NEGATIVE
KETONES UR QL STRIP: NEGATIVE
LEUKOCYTE ESTERASE UR QL STRIP: NEGATIVE
PH, POC UA: 6.5
POC BLOOD, URINE: NEGATIVE
POC NITRATES, URINE: NEGATIVE
PROT UR QL STRIP: NEGATIVE
SP GR UR STRIP: 1.01 (ref 1–1.03)
UROBILINOGEN UR STRIP-ACNC: 0.2 (ref 0.1–1.1)

## 2024-11-20 PROCEDURE — 87086 URINE CULTURE/COLONY COUNT: CPT | Performed by: OBSTETRICS & GYNECOLOGY

## 2024-11-20 NOTE — PROGRESS NOTES
HPI  Rocky Burch is a 34 y.o.   Unknown here for Initial Prenatal Visit   NO C/O'S.Denies any VB, ROM, and PIH sxs.    Rocky's allergies were reviewed and updated as appropriate.    Past Medical History:   Diagnosis Date    Anxiety disorder, unspecified     Constipation     Fetal demise, greater than 22 weeks, antepartum, fetus 1 2023    Maternal care for (suspected) chromosomal abnormality in fetus, trisomy 18, not applicable or unspecified 2023    Molar pregnancy     Rh negative state in antepartum period, third trimester 2023    Stillborn, abnormal      Family History   Problem Relation Name Age of Onset    Depression Father      Hypertension Father      Gestational diabetes Mother       Social History     Tobacco Use    Smoking status: Never     Passive exposure: Never    Smokeless tobacco: Never   Substance Use Topics    Alcohol use: Not Currently     Comment: social    Drug use: Never     OB History    Para Term  AB Living   3 0   0 2     SAB IAB Ectopic Multiple Live Births     1   0        # Outcome Date GA Lbr José Miguel/2nd Weight Sex Type Anes PTL Lv   3 Current            2 Molar 23           1 IAB 23 33w2d 02:25  00:18 0.959 kg (2 lb 1.8 oz) F Vag-Spont None  FD      Complications: Trisomy 13, unspecified     No current outpatient medications on file.    ROS:  A comprehensive review of symptoms was completed and negative except as noted above.    Physical Exam:  Chaperone present for exam.    /82   Pulse 84   Wt 67 kg (147 lb 12.8 oz)   LMP 2024 (Exact Date)   BMI 28.87 kg/m²     Gen: No distress  Abdomen: Gravid, non-tender  Pelvic: PAP COLLECTED.   Extremities: No edema    Fetal Heart Rate: 161 (BABY A)  Fetal Heart Rate: 179 (BABY B)    No results found for this or any previous visit (from the past 24 hours).    Last PAP Date: 2024    ASSESSMENT/PLAN:    1. Encounter for supervision of other normal pregnancy in first trimester  -      POCT Urinalysis, Dipstick, Automated, W/O Scope  -     Urine Culture High Risk    2. Unsure of LMP (last menstrual period) as reason for ultrasound scan  -     US OB/GYN Procedure ([facility]) - Extended List - Future    3. Screening for malignant neoplasm of the cervix  -     Liquid-Based Pap Smear, Screening; Future; Expected date: 11/20/2024  -     Chlamydia trachomatis  -     Neisseria gonorrhoeae  -     Trichomonas vaginalis    4. Encounter for screening for infections with a predominantly sexual mode of transmission  -     Liquid-Based Pap Smear, Screening; Future; Expected date: 11/20/2024  -     Chlamydia trachomatis  -     Neisseria gonorrhoeae  -     Trichomonas vaginalis    5. History of poor pregnancy outcome      Labor unit and pre-eclampsia precautions given.  Fetal kick count instructions given to patient.     Follow Up:  Follow up in about 4 weeks (around 12/18/2024) for OB VS.   .

## 2024-11-23 LAB — BACTERIA UR CULT: NO GROWTH

## 2024-11-25 LAB
CHLAMYDIA TRACHOMATIS: NEGATIVE
NEISSERIA GONORRHOEAE: NEGATIVE
PSYCHE PATHOLOGY RESULT: NORMAL
TRICHOMONAS VAGINALIS: NEGATIVE

## 2024-11-27 ENCOUNTER — LAB VISIT (OUTPATIENT)
Dept: LAB | Facility: CLINIC | Age: 34
End: 2024-11-27
Payer: COMMERCIAL

## 2024-11-27 DIAGNOSIS — Z3A.10 10 WEEKS GESTATION OF PREGNANCY: Primary | ICD-10-CM

## 2024-11-27 LAB
ERYTHROCYTE [DISTWIDTH] IN BLOOD BY AUTOMATED COUNT: 12.8 % (ref 11–14.5)
GLUCOSE 1H P 100 G GLC PO SERPL-MCNC: 50 MG/DL (ref 100–180)
GROUP & RH: NORMAL
HBV SURFACE AG SERPL QL IA: NEGATIVE
HBV SURFACE AG SERPL QL IA: NORMAL
HCT VFR BLD AUTO: 36.7 % (ref 36–48)
HGB BLD-MCNC: 12.5 G/DL (ref 11.8–16)
INDIRECT COOMBS: NORMAL
MCH RBC QN AUTO: 29.2 PG (ref 27–34)
MCHC RBC AUTO-ENTMCNC: 34.1 G/DL (ref 31–37)
MCV RBC AUTO: 85.7 FL (ref 79–99)
NRBC BLD AUTO-RTO: 0 %
PLATELET # BLD AUTO: 241 X10(3)/MCL (ref 140–371)
PMV BLD AUTO: 11.8 FL (ref 9.4–12.4)
RBC # BLD AUTO: 4.28 X10(6)/MCL (ref 4–5.1)
RUBELLA AB, IGG (OLG): >150 IU/ML
SPECIMEN OUTDATE: NORMAL
WBC # BLD AUTO: 6.29 X10(3)/MCL (ref 4–11.5)

## 2024-11-27 PROCEDURE — 86803 HEPATITIS C AB TEST: CPT | Performed by: OBSTETRICS & GYNECOLOGY

## 2024-11-27 PROCEDURE — 85027 COMPLETE CBC AUTOMATED: CPT | Performed by: OBSTETRICS & GYNECOLOGY

## 2024-11-27 PROCEDURE — 82950 GLUCOSE TEST: CPT | Performed by: OBSTETRICS & GYNECOLOGY

## 2024-11-27 PROCEDURE — 87340 HEPATITIS B SURFACE AG IA: CPT | Performed by: OBSTETRICS & GYNECOLOGY

## 2024-11-27 PROCEDURE — 86900 BLOOD TYPING SEROLOGIC ABO: CPT | Performed by: OBSTETRICS & GYNECOLOGY

## 2024-11-27 PROCEDURE — 87389 HIV-1 AG W/HIV-1&-2 AB AG IA: CPT | Performed by: OBSTETRICS & GYNECOLOGY

## 2024-11-27 PROCEDURE — 86762 RUBELLA ANTIBODY: CPT | Performed by: OBSTETRICS & GYNECOLOGY

## 2024-11-27 PROCEDURE — 86780 TREPONEMA PALLIDUM: CPT | Performed by: OBSTETRICS & GYNECOLOGY

## 2024-11-28 LAB
HCV AB SERPL QL IA: NONREACTIVE
HIV 1+2 AB+HIV1 P24 AG SERPL QL IA: NONREACTIVE
T PALLIDUM AB SER QL: NONREACTIVE

## 2024-12-04 ENCOUNTER — ROUTINE PRENATAL (OUTPATIENT)
Dept: OBSTETRICS AND GYNECOLOGY | Facility: CLINIC | Age: 34
End: 2024-12-04
Payer: COMMERCIAL

## 2024-12-04 VITALS
SYSTOLIC BLOOD PRESSURE: 120 MMHG | DIASTOLIC BLOOD PRESSURE: 60 MMHG | WEIGHT: 148 LBS | HEART RATE: 98 BPM | BODY MASS INDEX: 28.9 KG/M2

## 2024-12-04 DIAGNOSIS — G47.00 INSOMNIA, UNSPECIFIED TYPE: ICD-10-CM

## 2024-12-04 DIAGNOSIS — F32.A DEPRESSION, UNSPECIFIED DEPRESSION TYPE: ICD-10-CM

## 2024-12-04 DIAGNOSIS — O30.031 MONOCHORIONIC DIAMNIOTIC TWIN GESTATION IN FIRST TRIMESTER: Primary | ICD-10-CM

## 2024-12-04 DIAGNOSIS — Z3A.11 11 WEEKS GESTATION OF PREGNANCY: ICD-10-CM

## 2024-12-04 DIAGNOSIS — O21.9 NAUSEA AND VOMITING IN PREGNANCY: ICD-10-CM

## 2024-12-04 LAB
BILIRUB UR QL STRIP: NEGATIVE
GLUCOSE UR QL STRIP: NEGATIVE
KETONES UR QL STRIP: NEGATIVE
LEUKOCYTE ESTERASE UR QL STRIP: NEGATIVE
PH, POC UA: 7
POC BLOOD, URINE: NEGATIVE
POC NITRATES, URINE: NEGATIVE
PROT UR QL STRIP: NEGATIVE
SP GR UR STRIP: 1.02 (ref 1–1.03)
UROBILINOGEN UR STRIP-ACNC: 0.2 (ref 0.1–1.1)

## 2024-12-04 NOTE — PROGRESS NOTES
HPI  Rocky Burch is a 34 y.o.  11w2d here for Routine Prenatal Visit (NO C/O'S.)    Denies VB and pelvic pain.    Rocky's allergies were reviewed and updated as appropriate.    Past Medical History:   Diagnosis Date    Anxiety disorder, unspecified     Constipation     Fetal demise, greater than 22 weeks, antepartum, fetus 1 2023    Maternal care for (suspected) chromosomal abnormality in fetus, trisomy 18, not applicable or unspecified 2023    Molar pregnancy     Rh negative state in antepartum period, third trimester 2023    Stillborn, abnormal      Family History   Problem Relation Name Age of Onset    Depression Father      Hypertension Father      Gestational diabetes Mother       Social History     Tobacco Use    Smoking status: Never     Passive exposure: Never    Smokeless tobacco: Never   Substance Use Topics    Alcohol use: Not Currently     Comment: social    Drug use: Never     OB History    Para Term  AB Living   3 1   1 1     SAB IAB Ectopic Multiple Live Births         0        # Outcome Date GA Lbr José Miguel/2nd Weight Sex Type Anes PTL Lv   3 Current            2 Molar 23           1  23 33w2d 02:25 / 00:18 0.959 kg (2 lb 1.8 oz) F Vag-Spont None N FD     No current outpatient medications on file.    ROS:  A comprehensive review of symptoms was completed and negative except as noted above.    Physical Exam:  Chaperone present for exam.    /60   Pulse 98   Wt 67.1 kg (148 lb)   LMP 2024 (Exact Date)   BMI 28.90 kg/m²     Gen: No distress.  Abdomen: Gravid, non-tender.  Pelvic: DEFERRED  Extremities: No edema.    Fetal Heart Rate: 158 (BABY A)  Fetal Heart Rate: 170 (BABY B)    Recent Results (from the past 24 hours)   POCT Urinalysis, Dipstick, Automated, W/O Scope    Collection Time: 24  3:24 PM   Result Value Ref Range    POC Blood, Urine Negative Negative    POC Bilirubin, Urine Negative Negative    POC Urobilinogen, Urine  0.2 0.1 - 1.1    POC Ketones, Urine Negative Negative    POC Protein, Urine Negative Negative    POC Nitrates, Urine Negative Negative    POC Glucose, Urine Negative Negative    pH, UA 7.0     POC Specific Gravity, Urine 1.020 1.003 - 1.029    POC Leukocytes, Urine Negative Negative     PRENATAL LABS:  ABO/Rh:   Lab Results   Component Value Date/Time    GROUPTRH O NEG 11/27/2024 09:24 AM    GROUPTRH O NEG 09/21/2022 12:00 AM    ABORH O NEG 09/19/2023 12:50 PM    ABSCREEN NEG 09/19/2023 12:50 PM    ABSCREEN Negative 08/22/2022 12:00 AM     H&H:  Lab Results   Component Value Date/Time    HGB 12.5 11/27/2024 09:24 AM    HGB 12.1 11/23/2022 12:00 AM    HCT 36.7 11/27/2024 09:24 AM    HCT 35 (A) 11/23/2022 12:00 AM     Platelet:  Lab Results   Component Value Date/Time     11/27/2024 09:24 AM     11/23/2022 12:00 AM     Osulivan:   Lab Results   Component Value Date/Time    YHYV2AI 50 (L) 11/27/2024 09:24 AM     HIV:   Lab Results   Component Value Date/Time    HIV Nonreactive 11/27/2024 09:24 AM    [id number] Nonreactive 09/21/2022 12:00 AM     RPR:  Lab Results   Component Value Date/Time    RPR nonreactive 09/21/2022 12:00 AM    SYPHAB Nonreactive 11/27/2024 09:24 AM     Hepatitis B Surface Antigen:  Lab Results   Component Value Date/Time    HEPBSAG Negative 11/27/2024 09:24 AM    HEPBSAG Negative 09/21/2022 12:00 AM     Hepatitis C:  Lab Results   Component Value Date/Time    HEPCAB Nonreactive 11/27/2024 09:24 AM    HEPCAB Negative 09/21/2022 12:00 AM     Rubella Immune Status:  Lab Results   Component Value Date/Time    RUBELLAIMMUN Immune 09/21/2022 12:00 AM    RUBELLAIGG >150.00 11/27/2024 09:24 AM     Last PAP Date: 11/25/2024    ASSESSMENT/PLAN:  1. Monochorionic diamniotic twin gestation in first trimester  WILL REFER TO THE [facility] (DR. NADJA LIU)    2. 11 weeks gestation of pregnancy  -     POCT Urinalysis, Dipstick, Automated, W/O Scope    3. Nausea and vomiting in  pregnancy  IMPROVING     4. Depression, unspecified depression type  PATIENT DISCONTINUED ALL MEDS FOR NOW.    5. Insomnia, unspecified type      Miscarriage precautions discussed with patient, as well as labor unit precautions.     Follow Up:  Follow up in about 2 weeks (around 12/18/2024) for /S.

## 2024-12-09 ENCOUNTER — TELEPHONE (OUTPATIENT)
Dept: OBSTETRICS AND GYNECOLOGY | Facility: CLINIC | Age: 34
End: 2024-12-09
Payer: COMMERCIAL

## 2024-12-10 ENCOUNTER — PROCEDURE VISIT (OUTPATIENT)
Dept: MATERNAL FETAL MEDICINE | Facility: CLINIC | Age: 34
End: 2024-12-10
Payer: COMMERCIAL

## 2024-12-10 ENCOUNTER — TELEPHONE (OUTPATIENT)
Dept: OBSTETRICS AND GYNECOLOGY | Facility: CLINIC | Age: 34
End: 2024-12-10
Payer: COMMERCIAL

## 2024-12-10 ENCOUNTER — OFFICE VISIT (OUTPATIENT)
Dept: MATERNAL FETAL MEDICINE | Facility: CLINIC | Age: 34
End: 2024-12-10
Payer: COMMERCIAL

## 2024-12-10 VITALS
WEIGHT: 146.5 LBS | BODY MASS INDEX: 28.76 KG/M2 | DIASTOLIC BLOOD PRESSURE: 77 MMHG | HEIGHT: 60 IN | SYSTOLIC BLOOD PRESSURE: 119 MMHG | HEART RATE: 101 BPM

## 2024-12-10 DIAGNOSIS — O30.031 MONOCHORIONIC DIAMNIOTIC TWIN GESTATION IN FIRST TRIMESTER: ICD-10-CM

## 2024-12-10 DIAGNOSIS — O09.299 PRIOR POOR OBSTETRICAL HISTORY, ANTEPARTUM: ICD-10-CM

## 2024-12-10 DIAGNOSIS — Z87.59 HISTORY OF IUFD: Primary | ICD-10-CM

## 2024-12-10 DIAGNOSIS — O30.032 MONOCHORIONIC DIAMNIOTIC TWIN GESTATION IN SECOND TRIMESTER: Primary | ICD-10-CM

## 2024-12-10 DIAGNOSIS — Z87.59 HISTORY OF IUFD: ICD-10-CM

## 2024-12-10 DIAGNOSIS — O28.9 ABNORMAL PRENATAL TEST: ICD-10-CM

## 2024-12-10 PROCEDURE — 76813 OB US NUCHAL MEAS 1 GEST: CPT | Mod: S$GLB,,, | Performed by: OBSTETRICS & GYNECOLOGY

## 2024-12-10 PROCEDURE — 1160F RVW MEDS BY RX/DR IN RCRD: CPT | Mod: CPTII,S$GLB,, | Performed by: OBSTETRICS & GYNECOLOGY

## 2024-12-10 PROCEDURE — 3078F DIAST BP <80 MM HG: CPT | Mod: CPTII,S$GLB,, | Performed by: OBSTETRICS & GYNECOLOGY

## 2024-12-10 PROCEDURE — 99205 OFFICE O/P NEW HI 60 MIN: CPT | Mod: S$GLB,,, | Performed by: OBSTETRICS & GYNECOLOGY

## 2024-12-10 PROCEDURE — 1159F MED LIST DOCD IN RCRD: CPT | Mod: CPTII,S$GLB,, | Performed by: OBSTETRICS & GYNECOLOGY

## 2024-12-10 PROCEDURE — 3074F SYST BP LT 130 MM HG: CPT | Mod: CPTII,S$GLB,, | Performed by: OBSTETRICS & GYNECOLOGY

## 2024-12-10 PROCEDURE — 76814 OB US NUCHAL MEAS ADD-ON: CPT | Mod: S$GLB,,, | Performed by: OBSTETRICS & GYNECOLOGY

## 2024-12-10 PROCEDURE — 3008F BODY MASS INDEX DOCD: CPT | Mod: CPTII,S$GLB,, | Performed by: OBSTETRICS & GYNECOLOGY

## 2024-12-10 NOTE — ASSESSMENT & PLAN NOTE
"Screening for common fetal aneuploidies is typically completed via cell-free DNA testing.  In all people, small pieces of genetic material (DNA) that are not contained within cells are present in the blood stream. During pregnancy, these small pieces of DNA are a mixture of the mother's DNA and DNA shed from the placenta. This test can indicate if there is an abnormal number of chromosomes 21, 18, 13, X, and Y. The approximate detection rate is 99% for Trisomy 21, 98% for Trisomy 18, 91% for Trisomy 13, and 90% for George Syndrome. The false positive rates are low ranging from 1/1000 to 2/1000.     This test may also incidentally determine other sources of aneuploidy which may not be fetal in origin. Inherent in this test is the chance to identify placental mosaicism which can occur about 1-2% of the time.     NIPT results via [employer] concluded fetal free DNA detected is monozygotic. However, no fetal fraction, gender, or risk of chromosome abnormalities reported. 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 This finding could also be due to normal variation and/or confined to placental tissue... Repeat cell-free DNA testing is not recommended..."    We have discussed the uncertainty of this test with multiple possibilities, one of which includes normal variation. We have discussed invasive testing options as well as risks/benefits/alternatives of each. It is possible that she or her partner are carrying a balanced translocation that increases their risk of aneuploidy. She and her  have not yet undergone parental karyotype to evaluate this. Genetic testing result from her baby "Suzanne" with trisomy 18 was not available today (she will try to locate it) but it may be helpful to understand if this is the case based on the type of test it was. Currently the NIPT findings suggest a possible " abnormality but US is reassuring and could be confined to placenta.    At this time, she wishes to hold off on further testing. She will notify our clinic if she changes her mind or would like to proceed with diagnostic genetic testing. She is considering amniocentesis at 15-16 weeks.  She is interested in genetic counseling. We will refer to Madison Patino. Her case was discussed with genetics counselor today.

## 2024-12-10 NOTE — PROGRESS NOTES
"MATERNAL-FETAL MEDICINE   CONSULT NOTE    Provider requesting consultation: Dr Gibson    SUBJECTIVE:     Ms. Rocky Burch is a 34 y.o.  female with IUP at 12w1d who is seen in consultation by MFM for evaluation and management of:  Problem   - Monochorionic diamniotic twin gestation in second trimester   - Prior poor obstetrical history, antepartum   - Abnormal prenatal test     Rocky presents for consultation due to twin gestation (monochorionic/diamniotic). She is not taking a low dose aspirin daily but will start.  She had a prior IUFD at 33 weeks with known Trisomy 18 and multiple anomalies.  NIPT with her current pregnancy with "atypical findings" without further information after contacting [facility].  She has a history of anxiety and depression after her following pregnancy loss and is following with a therapist routinely.          Review of patient's allergies indicates:  No Known Allergies    PMH:  Past Medical History:   Diagnosis Date    Anxiety disorder, unspecified     Constipation     Fetal demise, greater than 22 weeks, antepartum, fetus 1 2023    Maternal care for (suspected) chromosomal abnormality in fetus, trisomy 18, not applicable or unspecified 2023    Molar pregnancy     Rh negative state in antepartum period, third trimester 2023    Stillborn, abnormal        PObHx:  OB History    Para Term  AB Living   3 1   1 1     SAB IAB Ectopic Multiple Live Births   1 0   0        # Outcome Date GA Lbr José Miguel/2nd Weight Sex Type Anes PTL Lv   3 Current            2 SAB 23     SAB         Birth Comments: molar pregnancy   1  23 33w2d 02:25 / 00:18 0.959 kg (2 lb 1.8 oz) F Vag-Spont None  FD      Complications: Trisomy 13, unspecified       PSH:  Past Surgical History:   Procedure Laterality Date    DILATION AND CURETTAGE OF UTERUS USING SUCTION N/A 2023    Procedure: DILATION AND CURETTAGE, UTERUS, USING SUCTION;  Surgeon: Fidel Gibson MD;  " Location: [facility] OR;  Service: OB/GYN;  Laterality: N/A;    WISDOM TOOTH EXTRACTION         Family history:family history includes Depression in her father; Gestational diabetes in her mother; Hypertension in her father.    Social history: reports that she has never smoked. She has never been exposed to tobacco smoke. She has never used smokeless tobacco. She reports that she does not currently use alcohol. She reports that she does not use drugs.    Genetic history: The patient denies any inherited genetic diseases or birth defects in herself or her partner's personal history or family. Her first child had T18 and she also had a molar pregnancy.    Objective:   /77 (BP Location: Right arm, Patient Position: Sitting)   Pulse 101   Ht 5' (1.524 m)   Wt 66.4 kg (146 lb 7.9 oz)   LMP 2024 (Exact Date)   BMI 28.61 kg/m²     Ultrasound performed. See [facility] for full ultrasound report.    A viable monochorionic diamniotic twin pregnancy is noted.  Nuchal translucencies are normal for both.  Estimated crown-rump length measure 12w1d and 12w2d.  No abnormalities are noted.  Placenta is anterior.    ASSESSMENT/PLAN:     34 y.o.  female with IUP at 12w1d     - Monochorionic diamniotic twin gestation in second trimester  We discussed monochorionic twinning and reviewed the types of placentation seen and their frequency. I counseled her on the increased incidence of preeclampsia/gestational hypertension, gestational diabetes, fetal growth restriction, anemia, congenital anomalies, need for antepartum hospitalization,  labor/PPROM, stillbirth, and risk of postpartum hemorrhage that can occur in twin pregnancies. It was explained that all monochorionic twins, have a single placenta and that this puts them at risk for twin transfusion syndrome (TTTS). We discussed twin-transfusion syndrome which is seen in ~ 10-15% of monochorionic twins due to unbalanced vascular communications within the placenta  and can result in discordant fetal growth and fluid volume. We discussed that in its severe form, it can potentially result in  delivery or fetal morbidity or mortality due to poor placental function of the smaller twin and volume overload and cardiomyopathy of the larger twin. Treatment may consist of observation or laser photocoagulation of communicating vessels depending upon the circumstances and timing of the presentation. I reviewed with the patient the need for fetal ultrasound assessment for TTTS surveillance every two weeks starting at 16 weeks.     Recommendations:   Fetal ultrasound assessment every two weeks, starting at 16 weeks until delivery for TTTS surveillance (scheduled with MFM)  If high suspicion for evolving TTTS, increase ultrasound surveillance to once or twice weekly  Transvaginal cervical length screening at time of targeted anatomy ultrasound w/ MFM  Detailed anatomy surveys at 18-20 weeks (scheduled with MFM)  Fetal growth ultrasounds every 3-4 weeks starting at 23-24 weeks (scheduled with MFM)  Fetal echocardiogram at 22-24 weeks (MFM will arrange)  Low-dose aspirin daily (81 mg) beginning at 12-13 weeks to reduce the risk of preeclampsia  Folic acid 1 mg daily and ferrous sulfate 325 mg daily  Increase daily dietary intake by approximately 300 kcal above that for a rodrigues pregnancy, or 600 kcal over that of a nonpregnant woman and monitor weight gain   testing with weekly NST and AFV assessment beginning at 32 weeks (can alternate with MFM every other week)  Given the increased risks of a twin pregnancy, prenatal visits every 2-3 weeks from 24 - 32/34 weeks and weekly prenatal visits thereafter    Delivery timing:   Normal growth, no complications: 37 0/7 - 37 6/7 weeks gestation   Normal growth, maternal comorbidities: 36 0/7 - 36 6/7 weeks gestation   FGR of one or both: 34 0/7 - 35 6/7 weeks gestation  History of TTTS, FGR with abnormal UA Doppler, oligohydramnios,  or comorbid conditions: 32 0/7 - 34 6/7 weeks gestation    Comorbid conditions include: BMI >= 40, diabetes, hypertension, and complex maternal medical conditions associated with placental dysfunction (Lupus, renal disease, or other vascular disease).      - Prior poor obstetrical history, antepartum  I reviewed the patient's obstetric history which is remarkable for a previous stillbirth at 33 weeks gestation. Obstetric history remarkable for: Trisomy 18 with that pregnancy. Multiple anomalies noted on prenatal ultrasound consistent with diagnosis.    I counseled the patient regarding the risk for recurrent stillbirth. The recurrence risk depends on the underlying etiology. The cause of her previous pregnancy loss was Trisomy 18 as this diagnosis is incompatible with life. We have discussed the recurrence risk of chromosome abnormalities in subsequent pregnancies is estimated around 1% until age related risk exceeds this estimation.    Recommendations:  Genetic testing in all subsequent pregnancies (see separate documentation)      - Abnormal prenatal test  Screening for common fetal aneuploidies is typically completed via cell-free DNA testing.  In all people, small pieces of genetic material (DNA) that are not contained within cells are present in the blood stream. During pregnancy, these small pieces of DNA are a mixture of the mother's DNA and DNA shed from the placenta. This test can indicate if there is an abnormal number of chromosomes 21, 18, 13, X, and Y. The approximate detection rate is 99% for Trisomy 21, 98% for Trisomy 18, 91% for Trisomy 13, and 90% for George Syndrome. The false positive rates are low ranging from 1/1000 to 2/1000.     This test may also incidentally determine other sources of aneuploidy which may not be fetal in origin. Inherent in this test is the chance to identify placental mosaicism which can occur about 1-2% of the time.     NIPT results via [employer] concluded fetal free DNA  "detected is monozygotic. However, no fetal fraction, gender, or risk of chromosome abnormalities reported. 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 This finding could also be due to normal variation and/or confined to placental tissue... Repeat cell-free DNA testing is not recommended..."    We have discussed the uncertainty of this test with multiple possibilities, one of which includes normal variation. We have discussed invasive testing options as well as risks/benefits/alternatives of each. It is possible that she or her partner are carrying a balanced translocation that increases their risk of aneuploidy. She and her  have not yet undergone parental karyotype to evaluate this. Genetic testing result from her baby "Suzanne" with trisomy 18 was not available today (she will try to locate it) but it may be helpful to understand if this is the case based on the type of test it was. Currently the NIPT findings suggest a possible abnormality but US is reassuring and could be confined to placenta.    At this time, she wishes to hold off on further testing. She will notify our clinic if she changes her mind or would like to proceed with diagnostic genetic testing. She is considering amniocentesis at 15-16 weeks.  She is interested in genetic counseling. We will refer to Madison Patino. Her case was discussed with genetics counselor today.      FOLLOW UP:   F/u in 4 weeks for US/MFM visit      This consultation was completed with the assistance of Denisse Umana NP.      Citlaly Caro MD  Maternal Fetal Medicine         "

## 2024-12-10 NOTE — ASSESSMENT & PLAN NOTE
I reviewed the patient's obstetric history which is remarkable for a previous stillbirth at 33 weeks gestation. Obstetric history remarkable for: Trisomy 18 with that pregnancy. Multiple anomalies noted on prenatal ultrasound consistent with diagnosis.    I counseled the patient regarding the risk for recurrent stillbirth. The recurrence risk depends on the underlying etiology. The cause of her previous pregnancy loss was Trisomy 18 as this diagnosis is incompatible with life. We have discussed the recurrence risk of chromosome abnormalities in subsequent pregnancies is estimated around 1% until age related risk exceeds this estimation.    Recommendations:  Genetic testing in all subsequent pregnancies (see separate documentation)

## 2024-12-10 NOTE — TELEPHONE ENCOUNTER
Called pt gave results of [facility]. Pt was aware as she saw her results online. States she spoke with other pts who experienced same result on social media and did research all weekend. Answered pt questions.

## 2024-12-10 NOTE — ASSESSMENT & PLAN NOTE
We discussed monochorionic twinning and reviewed the types of placentation seen and their frequency. I counseled her on the increased incidence of preeclampsia/gestational hypertension, gestational diabetes, fetal growth restriction, anemia, congenital anomalies, need for antepartum hospitalization,  labor/PPROM, stillbirth, and risk of postpartum hemorrhage that can occur in twin pregnancies. It was explained that all monochorionic twins, have a single placenta and that this puts them at risk for twin transfusion syndrome (TTTS). We discussed twin-transfusion syndrome which is seen in ~ 10-15% of monochorionic twins due to unbalanced vascular communications within the placenta and can result in discordant fetal growth and fluid volume. We discussed that in its severe form, it can potentially result in  delivery or fetal morbidity or mortality due to poor placental function of the smaller twin and volume overload and cardiomyopathy of the larger twin. Treatment may consist of observation or laser photocoagulation of communicating vessels depending upon the circumstances and timing of the presentation. I reviewed with the patient the need for fetal ultrasound assessment for TTTS surveillance every two weeks starting at 16 weeks.     Recommendations:   Fetal ultrasound assessment every two weeks, starting at 16 weeks until delivery for TTTS surveillance (scheduled with MFM)  If high suspicion for evolving TTTS, increase ultrasound surveillance to once or twice weekly  Transvaginal cervical length screening at time of targeted anatomy ultrasound w/ MFM  Detailed anatomy surveys at 18-20 weeks (scheduled with MFM)  Fetal growth ultrasounds every 3-4 weeks starting at 23-24 weeks (scheduled with MFM)  Fetal echocardiogram at 22-24 weeks (MFM will arrange)  Low-dose aspirin daily (81 mg) beginning at 12-13 weeks to reduce the risk of preeclampsia  Folic acid 1 mg daily and ferrous sulfate 325 mg  daily  Increase daily dietary intake by approximately 300 kcal above that for a rodrigues pregnancy, or 600 kcal over that of a nonpregnant woman and monitor weight gain   testing with weekly NST and AFV assessment beginning at 32 weeks (can alternate with MFM every other week)  Given the increased risks of a twin pregnancy, prenatal visits every 2-3 weeks from 24 - 32/34 weeks and weekly prenatal visits thereafter    Delivery timing:   Normal growth, no complications: 37 0/7 - 37 6/7 weeks gestation   Normal growth, maternal comorbidities: 36 0/7 - 36 6/7 weeks gestation   FGR of one or both: 34 0/7 - 35 6/7 weeks gestation  History of TTTS, FGR with abnormal UA Doppler, oligohydramnios, or comorbid conditions: 32 0/7 - 34 6/7 weeks gestation    Comorbid conditions include: BMI >= 40, diabetes, hypertension, and complex maternal medical conditions associated with placental dysfunction (Lupus, renal disease, or other vascular disease).

## 2024-12-11 DIAGNOSIS — O30.031 MONOCHORIONIC DIAMNIOTIC TWIN GESTATION IN FIRST TRIMESTER: ICD-10-CM

## 2024-12-11 DIAGNOSIS — Z87.59 HISTORY OF IUFD: Primary | ICD-10-CM

## 2024-12-12 ENCOUNTER — PATIENT MESSAGE (OUTPATIENT)
Dept: MATERNAL FETAL MEDICINE | Facility: CLINIC | Age: 34
End: 2024-12-12
Payer: COMMERCIAL

## 2024-12-12 DIAGNOSIS — O30.031 MONOCHORIONIC DIAMNIOTIC TWIN GESTATION IN FIRST TRIMESTER: Primary | ICD-10-CM

## 2024-12-12 RX ORDER — CALCIUM CARBONATE 500(1250)
2 TABLET ORAL 2 TIMES DAILY
Qty: 60 TABLET | Refills: 5 | Status: SHIPPED | OUTPATIENT
Start: 2024-12-12 | End: 2025-12-12

## 2024-12-12 RX ORDER — FERROUS SULFATE 324(65)MG
324 TABLET, DELAYED RELEASE (ENTERIC COATED) ORAL DAILY
Qty: 30 TABLET | Refills: 5 | Status: SHIPPED | OUTPATIENT
Start: 2024-12-12 | End: 2025-01-11

## 2024-12-12 RX ORDER — CALCIUM CARBONATE/VITAMIN D3 600 MG-10
30 TABLET ORAL DAILY
Qty: 30 EACH | Refills: 9 | Status: SHIPPED | OUTPATIENT
Start: 2024-12-12 | End: 2025-01-11

## 2024-12-13 ENCOUNTER — OFFICE VISIT (OUTPATIENT)
Dept: MATERNAL FETAL MEDICINE | Facility: CLINIC | Age: 34
End: 2024-12-13
Payer: COMMERCIAL

## 2024-12-13 DIAGNOSIS — O28.9 ABNORMAL PRENATAL TEST: Primary | ICD-10-CM

## 2024-12-13 NOTE — PROGRESS NOTES
The patient location is: Home ([place])  The chief complaint leading to consultation is: Abnormal cfDNA screen    Visit type: audiovisual    Face to Face time with patient: 55 min  55 minutes of total time spent on the encounter, which includes face to face time and non-face to face time preparing to see the patient (eg, review of tests), Obtaining and/or reviewing separately obtained history, Documenting clinical information in the electronic or other health record, Independently interpreting results (not separately reported) and communicating results to the patient/family/caregiver, or Care coordination (not separately reported).         Each patient to whom he or she provides medical services by telemedicine is:  (1) informed of the relationship between the physician and patient and the respective role of any other health care provider with respect to management of the patient; and (2) notified that he or she may decline to receive medical services by telemedicine and may withdraw from such care at any time.    Notes:     Office Visit - Genetic Counseling Evaluation   Rocky Burch  : 1990  MRN: [id number]  REFERRING PROVIDER: Dr. Citlaly Caro  PARTNER NAME: Nicholas    DATE OF SERVICE: 24  TIME SPENT: 55 min    REASON FOR CONSULT:  Rocky Burch, a 34 y.o. female with an abnormal fetal finding on her recent cfDNA screen outside of the parameters of the test involving chromosome 13 was referred for genetic counseling to discuss this finding. She came to the appointment with her partner.     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 Her pregnancy with Trisomy 18 was diagnosed based on clinical features and no confirmatory genetic testing is in her chart. Neither she nor her partner have had parental chromosome studies.     OBSETRIC HISTORY   AGE AT CRISELDA: 34y  CRISELDA: 2025  [place]  [place]  GESTATIONAL AGE:12w4d    PREGNANCY HISTORY    OB History    Para Term  AB Living   3 1 0 1 1 0   SAB IAB Ectopic Multiple Live Births   1 0 0 0 0      # Outcome Date GA Lbr José Miguel/2nd Weight Sex Type Anes PTL Lv   3 Current            2 SAB 23     SAB         Birth Comments: molar pregnancy   1  23 33w2d 02:25 / 00:18 0.959 kg (2 lb 1.8 oz) F Vag-Spont None  FD      Complications: Trisomy 13, unspecified      Name: SIMONE TALLEY FD      Apgar1: 0  Apgar5: 0       MEDICAL HISTORY:  MEDICATIONS/EXPOSURES: Not discussed    FAMILY HISTORY:  Please see scanned pedigree in patient's chart under media. Consanguinity was denied.     Additional history negative for multiple miscarriages/stillbirth, developmental delay/intellectual disability, and known genetic disorders. Complete pedigree will be linked to this encounter and can be viewed under the Media tab. The information provided is based on the patient and/or their reproductive partner's recollection of the family history and in the absence of complete medical records. If the family history changes or if more information is obtained, they were asked to contact us as this may alter the recommendations or impression of the family history.     PAST TESTING  Patient carrier screening: None in record  Reproductive partner carrier screening: NA  Parental Karyotypes: NA    PREGNANCY TESTING  cfDNA for aneuploidy: Atypical finding  Diagnostic testing: NA  Fetal karyotype: NA    COUNSELING:   Previous pregnancies with chromosome anomalies  Rocky's two previous pregnancies each had a genetic diagnosis. Her first pregnancy was suspected to be affected by Trisomy 18 and her second was a molar pregnancy due to complete pat UPD.     Typically each of these anomalies are de will events which do not have a high recurrence risk. For each, a molar pregnancy and Trisomy 18, the recurrence risk is typically as high as 1%.  However, there was  not confirmatory diagnostic testing on Rocky's pregnancy with suspected Trisomy 18 and therefore a translocation cannot be ruled out and the recurrence risk may be increased.     Atypical finding on cfDNA screen  Rocky 's cell-free DNA testing was done by [facility] and resulted in no result for all aneuploidy screened; with an atypical finding involving chromosome 13. This finding is suspected to be of feto/placental origin. We reviewed the basic concepts behind cell-free DNA testing and reviewed that it is a screening test that cannot provide definitive answers regarding fetal chromosome abnormalities. This finding could indicate mosaic or full Trisomy 13, it may also represent a duplication involving chromosome 13; this may be of fetal or placental origin.     We then discussed the possible explanations for the results regarding potential chromosome 13 aneuploidy. The results may be true positive and indicate fetal Trisomy 13 in full or mosaic form; they may also indicate fetal partial trisomy 13. Additionally, while less likely we could not rule out complete trisomy 13 of both babies or just one. Possible reasons for a false positive result include confined placental mosaicism and limitations of the laboratory testing. We discussed the range of phenotypic manifestations seen with Trisomy 13.    We then discussed amniocentesis as the diagnostic test available prenatally to provide definitive answers regarding the presence of fetal trisomy 13. The risks, benefits, and test performance characteristics of amniocentesis were reviewed. We discussed that for twin gestations undergoing amniocentesis the risk is 1.8% in addition to the 6-7% background risk of loss between 11-24 weeks. We reviewed that  diagnosis is available also, either on cord blood or via blood draw from the infant/child. If CVS is chosen as a confirmatory diagnostic test a positive result could indicate a true positive but may also be the result  of confined placental mosaicism which occurs 1-2% of the time. The placenta itself may carry two cell lines which are not reflected in the fetus, this can result from trisomy rescue. Any mosaicism on CVS should be followed up with an amniocentesis. Risks for CVS were not discussed as they are not well defined in a twin pregnancy.     For this indication, analysis of chorionic villi or amniocytes may include FISH for chromosomes 13, 18 and 21 and the sex chromosomes and standard chromosome analysis with a concurrent microarray. Turn-around time for this testing was discussed. If amniocentesis confirms fetal mosaicism, we discussed the difficulties in predicting where on the spectrum of severity their child may fall regarding manifestations of this diagnosis.     Risks and benefits of testing were discussed today. Including the options for pregnancy continuation vs. termination. This was discussed briefly for the purposes of anticipatory guidance and were discussed non-directively.       Rocky is considering an amniocentesis after she has her next ultrasound, she is unsure if she will complete this testing, but reports feeling more interested in knowing definitive information after her conversation today and with Dr. Caro earlier this week.     DISCUSSION & IMPRESSION:  Rocky is a 34 y.o. female with an abnormal cfDNA screen noting an atypical result for chromosome 13 that is outside of the scope of the screening. We discussed this in detail. We also reviewed her first pregnancy did not have genetic testing that to our knowledge and therefore the trisomy was not officially confirmed. Parental chromosomes may be warranted in this case to rule out a translocation contributing to her current and previous pregnancy. This is not anticipated to explain the molar pregnancy. We further discussed that some individuals may have an increased risk for aneuploid events for reasons that have not been thoroughly studied. Some  research supports that women of younger ages have a slightly higher risk of recurrent aneuploidy than previously thought and may have an increased risk when a translocation has been ruled out. Finally the limitations of this screening in the context of a twin pregnancy were reviewed, specifically that it is difficult to determine if the screening may be representative of one or both twins.     TESTING OPTIONS  Diagnostic Testing: CVS vs Amniocentesis  Carrier Screening: NA  Parental karyotype: discussed and Rocky and Nicholas would like to proceed  Pregnancy Options: Not discussed  Recurrence Risk: Expected to be as high as 1%; to be re-evaluated after parental karyotypes  Procedures/labs DECLINED today: NA- deferred for decision making    Rocky stated that she felt more informed and like she had a plan.     We reviewed Rocky's medical and family history. We discussed basics of genetics, their history and screening options. Rocky was understanding of the information discussed in clinic and all questions were answered.     Please see Dr. Caro' note for detailed information regarding ultrasound findings, plan of care and diagnostic considerations.    RECOMMENDATIONS:  Parental chromosome analysis   Testing ordered on fetal tissue should include a chromosome analysis and microarray if possible    Madison Patino MS, CGC  Licensed, Certified Genetic Counselor  [facility]    Cited work:   Gregg YP, Gregg DJ, uLdy ZB, Viet WT. Chromosome 13q deletion syndrome involving 20d96-mjfe: A case report. Mol Med Rep. 2017 Steven;15(6):5107-4183. doi: 10.3892/mmr.2017.6425. Epub 2017 Apr 3. PMID: 17332883; PMCID: [id number].   Bj E, Daryl J, Arnulfo A, Hernan C, Dion MCCURDYK. Recurrence risks for trisomies 13, 18, and 21. Am J Med Vi A. 2009 Dec;149A(12):2716-22. doi: [email or web address]. PMID: 97949487.

## 2024-12-19 ENCOUNTER — ROUTINE PRENATAL (OUTPATIENT)
Dept: OBSTETRICS AND GYNECOLOGY | Facility: CLINIC | Age: 34
End: 2024-12-19
Payer: COMMERCIAL

## 2024-12-19 VITALS
HEART RATE: 90 BPM | DIASTOLIC BLOOD PRESSURE: 60 MMHG | SYSTOLIC BLOOD PRESSURE: 120 MMHG | WEIGHT: 151 LBS | BODY MASS INDEX: 29.49 KG/M2

## 2024-12-19 DIAGNOSIS — O30.031 MONOCHORIONIC DIAMNIOTIC TWIN GESTATION IN FIRST TRIMESTER: Primary | ICD-10-CM

## 2024-12-19 DIAGNOSIS — Z3A.13 13 WEEKS GESTATION OF PREGNANCY: ICD-10-CM

## 2024-12-19 DIAGNOSIS — O21.9 NAUSEA AND VOMITING IN PREGNANCY: ICD-10-CM

## 2024-12-19 LAB
BILIRUB UR QL STRIP: NEGATIVE
GLUCOSE UR QL STRIP: NEGATIVE
KETONES UR QL STRIP: NEGATIVE
LEUKOCYTE ESTERASE UR QL STRIP: POSITIVE
PH, POC UA: 7
POC BLOOD, URINE: NEGATIVE
POC NITRATES, URINE: NEGATIVE
PROT UR QL STRIP: NEGATIVE
SP GR UR STRIP: 1.02 (ref 1–1.03)
UROBILINOGEN UR STRIP-ACNC: 0.2 (ref 0.1–1.1)

## 2024-12-19 PROCEDURE — 99499 UNLISTED E&M SERVICE: CPT | Mod: ,,, | Performed by: OBSTETRICS & GYNECOLOGY

## 2024-12-19 NOTE — PROGRESS NOTES
HPI  Rocky Burch is a 34 y.o.  13w3d here for Routine Prenatal Visit (NO C/O )    Denies VB and pelvic pain.    Rocky's allergies were reviewed and updated as appropriate.    Past Medical History:   Diagnosis Date    Anxiety disorder, unspecified     Constipation     Fetal demise, greater than 22 weeks, antepartum, fetus 1 2023    Maternal care for (suspected) chromosomal abnormality in fetus, trisomy 18, not applicable or unspecified 2023    Molar pregnancy     Rh negative state in antepartum period, third trimester 2023    Stillborn, abnormal      Family History   Problem Relation Name Age of Onset    Depression Father      Hypertension Father      Gestational diabetes Mother       Social History     Tobacco Use    Smoking status: Never     Passive exposure: Never    Smokeless tobacco: Never   Substance Use Topics    Alcohol use: Not Currently     Comment: social    Drug use: Never     OB History    Para Term  AB Living   3 1   1 1     SAB IAB Ectopic Multiple Live Births   1 0   0        # Outcome Date GA Lbr José Miguel/2nd Weight Sex Type Anes PTL Lv   3 Current            2 SAB 23     SAB         Birth Comments: molar pregnancy   1  23 33w2d 02:25 / 00:18 0.959 kg (2 lb 1.8 oz) F Vag-Spont None  FD      Complications: Trisomy 13, unspecified       Current Outpatient Medications:     calcium carbonate (OS-DEMETRI) 500 mg calcium (1,250 mg) tablet, Take 2 tablets (1,000 mg total) by mouth 2 (two) times daily., Disp: 60 tablet, Rfl: 5    aspirin 81 mg Cap, Take 81 mg by mouth once daily., Disp: , Rfl:     busPIRone (BUSPAR) 5 MG Tab, Take 5 mg by mouth 2 (two) times daily as needed., Disp: , Rfl:     ferrous sulfate 324 mg (65 mg iron) TbEC, Take 325 mg by mouth once daily., Disp: , Rfl:     prenatal vit no.124/iron/folic (PRENATAL VITAMIN ORAL), Take 1 tablet by mouth once daily., Disp: , Rfl:     ZINC ORAL, Take by mouth., Disp: , Rfl:     ROS:  A comprehensive  review of symptoms was completed and negative except as noted above.    Physical Exam:  Chaperone present for exam.    /60   Pulse 90   Wt 68.5 kg (151 lb)   LMP 09/16/2024 (Exact Date)   BMI 29.49 kg/m²     Gen: No distress.  Abdomen: Gravid, non-tender.  Pelvic:   Extremities: No edema.    Fetal Heart Rate: 158 (BABY B)      ASSESSMENT/PLAN:  1. Monochorionic diamniotic twin gestation in first trimester  -     POCT Urinalysis, Dipstick, Automated, W/O Scope    2. 13 weeks gestation of pregnancy    3. Nausea and vomiting in pregnancy    Miscarriage precautions discussed with patient, as well as labor unit precautions.     Follow Up:  Follow up in about 4 weeks (around 1/16/2025) for SEX US.

## 2025-01-06 ENCOUNTER — PATIENT MESSAGE (OUTPATIENT)
Dept: OTHER | Facility: OTHER | Age: 35
End: 2025-01-06
Payer: COMMERCIAL

## 2025-01-09 ENCOUNTER — PROCEDURE VISIT (OUTPATIENT)
Dept: MATERNAL FETAL MEDICINE | Facility: CLINIC | Age: 35
End: 2025-01-09
Payer: COMMERCIAL

## 2025-01-09 ENCOUNTER — OFFICE VISIT (OUTPATIENT)
Dept: MATERNAL FETAL MEDICINE | Facility: CLINIC | Age: 35
End: 2025-01-09
Payer: COMMERCIAL

## 2025-01-09 ENCOUNTER — PATIENT MESSAGE (OUTPATIENT)
Dept: PEDIATRIC CARDIOLOGY | Facility: CLINIC | Age: 35
End: 2025-01-09
Payer: COMMERCIAL

## 2025-01-09 VITALS
SYSTOLIC BLOOD PRESSURE: 100 MMHG | HEART RATE: 100 BPM | BODY MASS INDEX: 30.52 KG/M2 | WEIGHT: 155.44 LBS | HEIGHT: 60 IN | DIASTOLIC BLOOD PRESSURE: 58 MMHG

## 2025-01-09 DIAGNOSIS — O09.299 PRIOR POOR OBSTETRICAL HISTORY, ANTEPARTUM: ICD-10-CM

## 2025-01-09 DIAGNOSIS — O28.9 ABNORMAL PRENATAL TEST: ICD-10-CM

## 2025-01-09 DIAGNOSIS — O30.031 MONOCHORIONIC DIAMNIOTIC TWIN GESTATION IN FIRST TRIMESTER: ICD-10-CM

## 2025-01-09 DIAGNOSIS — Z87.59 HISTORY OF IUFD: ICD-10-CM

## 2025-01-09 DIAGNOSIS — O30.032 MONOCHORIONIC DIAMNIOTIC TWIN GESTATION IN SECOND TRIMESTER: Primary | ICD-10-CM

## 2025-01-09 PROBLEM — O02.0 COMPLETE MOLAR PREGNANCY: Status: RESOLVED | Noted: 2023-09-20 | Resolved: 2025-01-09

## 2025-01-09 RX ORDER — BUSPIRONE HYDROCHLORIDE 5 MG/1
5 TABLET ORAL 2 TIMES DAILY
COMMUNITY

## 2025-01-09 NOTE — ASSESSMENT & PLAN NOTE
"Screening for common fetal aneuploidies is typically completed via cell-free DNA testing.  In all people, small pieces of genetic material (DNA) that are not contained within cells are present in the blood stream. During pregnancy, these small pieces of DNA are a mixture of the mother's DNA and DNA shed from the placenta. This test can indicate if there is an abnormal number of chromosomes 21, 18, 13, X, and Y. The approximate detection rate is 99% for Trisomy 21, 98% for Trisomy 18, 91% for Trisomy 13, and 90% for George Syndrome. The false positive rates are low ranging from 1/1000 to 2/1000.     This test may also incidentally determine other sources of aneuploidy which may not be fetal in origin. Inherent in this test is the chance to identify placental mosaicism which can occur about 1-2% of the time.     NIPT results via [facility] concluded fetal free DNA detected is monozygotic. However, no fetal fraction, gender, or risk of chromosome abnormalities reported. 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 This finding could also be due to normal variation and/or confined to placental tissue... Repeat cell-free DNA testing is not recommended..."    We have discussed the uncertainty of this test with multiple possibilities, one of which includes normal variation. We have discussed invasive testing options as well as risks/benefits/alternatives of each. It is possible that she or her partner are carrying a balanced translocation that increases their risk of aneuploidy. She and her  have not yet undergone parental karyotype to evaluate this. Genetic testing result from her baby "Suzanne" with trisomy 18 was not available today (she will try to locate it) but it may be helpful to understand if this is the case based on the type of test it was. Currently the NIPT findings suggest a possible " abnormality but US is reassuring and could be confined to placenta.    At this time, she wishes to hold off on further testing. She will notify our clinic if she changes her mind or would like to proceed with diagnostic genetic testing. She is considering amniocentesis at 15-16 weeks.  She is interested in genetic counseling. We will refer to Madison Patino. Her case was discussed with genetics counselor today.    1/9/25- Had consult w/ genetics counselor (Madison). Politely declines any further testing at this time.

## 2025-01-09 NOTE — PROGRESS NOTES
Maternal Fetal Medicine follow up consult    SUBJECTIVE:     Rocky Burch is a 34 y.o.  female with IUP at 16w3d who is seen in follow up consultation by M.  Pregnancy complications include:   Problem   - Monochorionic diamniotic twin gestation in second trimester   - Prior poor obstetrical history, antepartum   - Abnormal prenatal test   Complete Molar Pregnancy (Resolved)     Rocky presents for routine follow up appointment.  Completed consult w/ genetics counselor.   Denies LOF, VB, contractions.     Previous notes reviewed.   No changes to medical, surgical, family, social, or obstetric history.  Interval history since last MFM visit: see above  Medications reviewed.    Care team members:  Dr Gibson - Primary OB     OBJECTIVE:   BP (!) 100/58 (BP Location: Right arm, Patient Position: Sitting)   Pulse 100   Ht 5' (1.524 m)   Wt 70.5 kg (155 lb 6.8 oz)   LMP 2024 (Exact Date)   BMI 30.35 kg/m²     Ultrasound performed. See viewpoint for full ultrasound report.    Monochorionic diamniotic twin intrauterine pregnancy is identified with two living fetuses.   Baby A- early anatomic survey is unremarkable. MVP is 2.9cm.   Baby B-  early anatomic survey is unremarkable. MVP is 2.5cm.   The fetal sex is congruent and the dividing membrane is thin, in agreement with stated monochorionic diamniotic twins.   Baby A measures 160g and baby B measures 147g. Fetal growth is concordant with 8% difference.   No signs of twin to twin transfusion are noted.  The placenta is anterior.    ASSESSMENT/PLAN:     34 y.o.  female with IUP at 16w3d    - Monochorionic diamniotic twin gestation in second trimester  We discussed monochorionic twinning and reviewed the types of placentation seen and their frequency. I counseled her on the increased incidence of preeclampsia/gestational hypertension, gestational diabetes, fetal growth restriction, anemia, congenital anomalies, need for antepartum hospitalization,   labor/PPROM, stillbirth, and risk of postpartum hemorrhage that can occur in twin pregnancies. It was explained that all monochorionic twins, have a single placenta and that this puts them at risk for twin transfusion syndrome (TTTS). We discussed twin-transfusion syndrome which is seen in ~ 10-15% of monochorionic twins due to unbalanced vascular communications within the placenta and can result in discordant fetal growth and fluid volume. We discussed that in its severe form, it can potentially result in  delivery or fetal morbidity or mortality due to poor placental function of the smaller twin and volume overload and cardiomyopathy of the larger twin. Treatment may consist of observation or laser photocoagulation of communicating vessels depending upon the circumstances and timing of the presentation. I reviewed with the patient the need for fetal ultrasound assessment for TTTS surveillance every two weeks starting at 16 weeks.     25- no abnormalities noted on ultrasound today.  Normal-appearing monochorionic diamniotic gestation with no signs of TTTS.    Recommendations:   Fetal ultrasound assessment every two weeks, starting at 16 weeks until delivery for TTTS surveillance (scheduled with MFM)  If high suspicion for evolving TTTS, increase ultrasound surveillance to once or twice weekly  Transvaginal cervical length screening at time of targeted anatomy ultrasound w/ MFM  Detailed anatomy surveys at 18-20 weeks (scheduled with MFM)  Fetal growth ultrasounds every 3-4 weeks starting at 23-24 weeks (scheduled with [facility])  Fetal echocardiogram at 22-24 weeks (M will arrange)  Low-dose aspirin daily (81 mg) beginning at 12-13 weeks to reduce the risk of preeclampsia  Folic acid 1 mg daily and ferrous sulfate 325 mg daily  Increase daily dietary intake by approximately 300 kcal above that for a rodrigues pregnancy, or 600 kcal over that of a nonpregnant woman and monitor weight gain    testing with weekly NST and AFV assessment beginning at 32 weeks (can alternate with MFM every other week)  Given the increased risks of a twin pregnancy, prenatal visits every 2-3 weeks from 24 - 32/34 weeks and weekly prenatal visits thereafter    Delivery timing:   Normal growth, no complications: 37 0/7 - 37 6/7 weeks gestation   Normal growth, maternal comorbidities: 36 0/7 - 36 6/7 weeks gestation   FGR of one or both: 34 0/7 - 35 6/7 weeks gestation  History of TTTS, FGR with abnormal UA Doppler, oligohydramnios, or comorbid conditions: 32 0/7 - 34 6/7 weeks gestation    Comorbid conditions include: BMI >= 40, diabetes, hypertension, and complex maternal medical conditions associated with placental dysfunction (Lupus, renal disease, or other vascular disease).      - Prior poor obstetrical history, antepartum  I reviewed the patient's obstetric history which is remarkable for a previous stillbirth at 33 weeks gestation. Obstetric history remarkable for: Trisomy 18 with that pregnancy. Multiple anomalies noted on prenatal ultrasound consistent with diagnosis.    I counseled the patient regarding the risk for recurrent stillbirth. The recurrence risk depends on the underlying etiology. The cause of her previous pregnancy loss was Trisomy 18 as this diagnosis is incompatible with life. We have discussed the recurrence risk of chromosome abnormalities in subsequent pregnancies is estimated around 1% until age related risk exceeds this estimation.    Recommendations:  Genetic testing in all subsequent pregnancies (see separate documentation)      - Abnormal prenatal test  Screening for common fetal aneuploidies is typically completed via cell-free DNA testing.  In all people, small pieces of genetic material (DNA) that are not contained within cells are present in the blood stream. During pregnancy, these small pieces of DNA are a mixture of the mother's DNA and DNA shed from the placenta. This test can indicate  "if there is an abnormal number of chromosomes 21, 18, 13, X, and Y. The approximate detection rate is 99% for Trisomy 21, 98% for Trisomy 18, 91% for Trisomy 13, and 90% for George Syndrome. The false positive rates are low ranging from 1/1000 to 2/1000.     This test may also incidentally determine other sources of aneuploidy which may not be fetal in origin. Inherent in this test is the chance to identify placental mosaicism which can occur about 1-2% of the time.     NIPT results via [facility] concluded fetal free DNA detected is monozygotic. However, no fetal fraction, gender, or risk of chromosome abnormalities reported. 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 This finding could also be due to normal variation and/or confined to placental tissue... Repeat cell-free DNA testing is not recommended..."    We have discussed the uncertainty of this test with multiple possibilities, one of which includes normal variation. We have discussed invasive testing options as well as risks/benefits/alternatives of each. It is possible that she or her partner are carrying a balanced translocation that increases their risk of aneuploidy. She and her  have not yet undergone parental karyotype to evaluate this. Genetic testing result from her baby "Suzanne" with trisomy 18 was not available today (she will try to locate it) but it may be helpful to understand if this is the case based on the type of test it was. Currently the NIPT findings suggest a possible abnormality but US is reassuring and could be confined to placenta.    At this time, she wishes to hold off on further testing. She will notify our clinic if she changes her mind or would like to proceed with diagnostic genetic testing. She is considering amniocentesis at 15-16 weeks.  She is interested in genetic counseling. We will refer to " Madison Patino. Her case was discussed with genetics counselor today.    1/9/25- Had consult w/ genetics counselor (Madison). Politely declines any further testing at this time.     F/u in 2 weeks for TTTS surveillance  F/u in 4 weeks for MFM visit /growth ultrasound    Citlaly Caro MD  Maternal Fetal Medicine

## 2025-01-09 NOTE — ASSESSMENT & PLAN NOTE
We discussed monochorionic twinning and reviewed the types of placentation seen and their frequency. I counseled her on the increased incidence of preeclampsia/gestational hypertension, gestational diabetes, fetal growth restriction, anemia, congenital anomalies, need for antepartum hospitalization,  labor/PPROM, stillbirth, and risk of postpartum hemorrhage that can occur in twin pregnancies. It was explained that all monochorionic twins, have a single placenta and that this puts them at risk for twin transfusion syndrome (TTTS). We discussed twin-transfusion syndrome which is seen in ~ 10-15% of monochorionic twins due to unbalanced vascular communications within the placenta and can result in discordant fetal growth and fluid volume. We discussed that in its severe form, it can potentially result in  delivery or fetal morbidity or mortality due to poor placental function of the smaller twin and volume overload and cardiomyopathy of the larger twin. Treatment may consist of observation or laser photocoagulation of communicating vessels depending upon the circumstances and timing of the presentation. I reviewed with the patient the need for fetal ultrasound assessment for TTTS surveillance every two weeks starting at 16 weeks.     25- no abnormalities noted on ultrasound today.  Normal-appearing monochorionic diamniotic gestation with no signs of TTTS.    Recommendations:   Fetal ultrasound assessment every two weeks, starting at 16 weeks until delivery for TTTS surveillance (scheduled with [facility])  If high suspicion for evolving TTTS, increase ultrasound surveillance to once or twice weekly  Transvaginal cervical length screening at time of targeted anatomy ultrasound w/ MFM  Detailed anatomy surveys at 18-20 weeks (scheduled with MFM)  Fetal growth ultrasounds every 3-4 weeks starting at 23-24 weeks (scheduled with [facility])  Fetal echocardiogram at 22-24 weeks ([facility] will arrange)  Low-dose aspirin daily  (81 mg) beginning at 12-13 weeks to reduce the risk of preeclampsia  Folic acid 1 mg daily and ferrous sulfate 325 mg daily  Increase daily dietary intake by approximately 300 kcal above that for a rodrigues pregnancy, or 600 kcal over that of a nonpregnant woman and monitor weight gain   testing with weekly NST and AFV assessment beginning at 32 weeks (can alternate with MFM every other week)  Given the increased risks of a twin pregnancy, prenatal visits every 2-3 weeks from 24 - 32/34 weeks and weekly prenatal visits thereafter    Delivery timing:   Normal growth, no complications: 37 0/7 - 37 6/7 weeks gestation   Normal growth, maternal comorbidities: 36 0/7 - 36 6/7 weeks gestation   FGR of one or both: 34 0/7 - 35 6/7 weeks gestation  History of TTTS, FGR with abnormal UA Doppler, oligohydramnios, or comorbid conditions: 32 0/7 - 34 6/7 weeks gestation    Comorbid conditions include: BMI >= 40, diabetes, hypertension, and complex maternal medical conditions associated with placental dysfunction (Lupus, renal disease, or other vascular disease).

## 2025-01-13 ENCOUNTER — PATIENT MESSAGE (OUTPATIENT)
Dept: OTHER | Facility: OTHER | Age: 35
End: 2025-01-13
Payer: COMMERCIAL

## 2025-01-13 DIAGNOSIS — O30.032 MONOCHORIONIC DIAMNIOTIC TWIN GESTATION IN SECOND TRIMESTER: Primary | ICD-10-CM

## 2025-01-22 ENCOUNTER — PATIENT MESSAGE (OUTPATIENT)
Dept: MATERNAL FETAL MEDICINE | Facility: CLINIC | Age: 35
End: 2025-01-22
Payer: COMMERCIAL

## 2025-01-23 ENCOUNTER — PATIENT MESSAGE (OUTPATIENT)
Dept: MATERNAL FETAL MEDICINE | Facility: CLINIC | Age: 35
End: 2025-01-23

## 2025-01-25 ENCOUNTER — PROCEDURE VISIT (OUTPATIENT)
Dept: MATERNAL FETAL MEDICINE | Facility: CLINIC | Age: 35
End: 2025-01-25
Payer: COMMERCIAL

## 2025-01-25 VITALS — HEART RATE: 92 BPM | SYSTOLIC BLOOD PRESSURE: 117 MMHG | DIASTOLIC BLOOD PRESSURE: 66 MMHG

## 2025-01-25 DIAGNOSIS — O30.032 MONOCHORIONIC DIAMNIOTIC TWIN GESTATION IN SECOND TRIMESTER: ICD-10-CM

## 2025-01-25 PROCEDURE — 76816 OB US FOLLOW-UP PER FETUS: CPT | Mod: 59,S$GLB,, | Performed by: OBSTETRICS & GYNECOLOGY

## 2025-01-27 NOTE — PROGRESS NOTES
Chief Complaint:  Routine Prenatal Visit    History of Present Illness:  Rocky Burch is a 34 y.o. year old  at 19w1d presents for her ob exam. She is doing well. No complaints today.         Review of Systems:  General/Constitutional: Fever denies. Headache denies.     Skin: Rash denies.   Respiratory: Cough denies. SOB denies. Wheezing denies .   Cardiovascular: Chest pain denies. Dizziness denies. Palpitations denies. Swelling in hands/feet denies.    Gastrointestinal: Abdominal pain denies. Constipation denies. Diarrhea denies. Heartburn denies. Nausea denies. Vomiting denies.  Genitourinary: Painful urination denies.   Gynecologic: Vaginal bleeding denies. Vaginal discharge Normal. Leaking amniotic fluid denies.  Contractions denies.  Psychiatric: Depressed mood denies. Suicidal thoughts denies.       Current Outpatient Medications:     aspirin 81 mg Cap, Take 81 mg by mouth once daily., Disp: , Rfl:     busPIRone (BUSPAR) 5 MG Tab, Take 5 mg by mouth 2 (two) times daily as needed., Disp: , Rfl:     calcium carbonate (OS-DEMETRI) 500 mg calcium (1,250 mg) tablet, Take 2 tablets (1,000 mg total) by mouth 2 (two) times daily., Disp: 60 tablet, Rfl: 5    ferrous sulfate 324 mg (65 mg iron) TbEC, Take 325 mg by mouth once daily., Disp: , Rfl:     prenatal vit no.124/iron/folic (PRENATAL VITAMIN ORAL), Take 1 tablet by mouth once daily., Disp: , Rfl:     ZINC ORAL, Take by mouth., Disp: , Rfl:     Physical Exam:  /72 (BP Location: Right arm, Patient Position: Sitting)   Temp 97.9 °F (36.6 °C)   Ht 5' (1.524 m)   Wt 70.8 kg (156 lb)   LMP 2024 (Exact Date)   BMI 30.47 kg/m²       Constitutional: General appearance: healthy, well-nourished and well-developed   Psychiatric:  Orientation to time, place and person. Normal mood and affect and active, alert   Abdomen: Auscultation/Inspection/Palpation: Gravid. No tenderness or masses. Soft, nondistended   Extremities: no CCE    FHT A: 150  FHT B:  140      Assessment/Plan  1. Monochorionic diamniotic twin gestation in first trimester  Educated   Reviewed [facility] note w/ pt  Keep MFM appt 25- no abnormalities noted on ultrasound today.  Normal-appearing monochorionic diamniotic gestation with no signs of TTTS.     Recommendations:   Fetal ultrasound assessment every two weeks, starting at 16 weeks until delivery for TTTS surveillance (scheduled with MFM)  If high suspicion for evolving TTTS, increase ultrasound surveillance to once or twice weekly  Transvaginal cervical length screening at time of targeted anatomy ultrasound w/ MFM  Detailed anatomy surveys at 18-20 weeks (scheduled with MFM)  Fetal growth ultrasounds every 3-4 weeks starting at 23-24 weeks (scheduled with MFM)  Fetal echocardiogram at 22-24 weeks ([facility] will arrange)  Low-dose aspirin daily (81 mg) beginning at 12-13 weeks to reduce the risk of preeclampsia  Folic acid 1 mg daily and ferrous sulfate 325 mg daily  Increase daily dietary intake by approximately 300 kcal above that for a rodrigues pregnancy, or 600 kcal over that of a nonpregnant woman and monitor weight gain   testing with weekly NST and AFV assessment beginning at 32 weeks (can alternate with MFM every other week)  Given the increased risks of a twin pregnancy, prenatal visits every 2-3 weeks from 24 - 32/34 weeks and weekly prenatal visits thereafter     Delivery timing:              Normal growth, no complications: 37 0/7 - 37 6/7 weeks gestation              Normal growth, maternal comorbidities: 36 0/7 - 36 6/7 weeks gestation              FGR of one or both: 34 0/7 - 35 6/7 weeks gestation  History of TTTS, FGR with abnormal UA Doppler, oligohydramnios, or comorbid conditions: 32 0/7 - 34 6/7 weeks gestation      2. Prior poor obstetrical history, antepartum, Abnormal prenatal test  Educated  Reviewed M note/ pt      I reviewed the patient's obstetric history which is remarkable for a previous  "stillbirth at 33 weeks gestation. Obstetric history remarkable for: Trisomy 18 with that pregnancy.     She and her  have not yet undergone parental karyotype to evaluate this.     NIPT results via [employer] concluded fetal free DNA detected is monozygotic. However, no fetal fraction, gender, or risk of chromosome abnormalities reported. 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 This finding could also be due to normal variation and/or confined to placental tissue... Repeat cell-free DNA testing is not recommended..."    1/9/25- Had consult w/ genetics counselor (Madison). Politely declines any further testing at this time.     Recommendations:  Genetic testing in all subsequent pregnancies (see separate documentation)       3. Depression, antepartum  Educated  Counseling information given for Michelle Zuleta [facility] [phone]  HI & SI precautions  Controlled w/ Buspar 7.5 mg     4. 19 weeks gestation of pregnancy  Instructed on weight gain, healthy exercise, vitamins, avoidance of drugs tobacco and alcohol. Pain, fever, bleeding precautions.        Discussed with patient travel precautions.         Educated on compliance of future appts  Future Appointments   Date Time Provider Department Center   2/6/2025  8:00 AM [facility]   2/6/2025  9:20 AM Citlaly Caro MD [facility] [place]   2/20/2025  8:20 AM ROOM 2, [facility]   3/11/2025  9:30 AM Iliana Menard MD [facility] [place]   3/11/2025  9:30 AM CV [facility] FETAL ECHO 01 [facility] [place]   3/11/2025 10:30 AM CV [facility] FETAL ECHO 01 [facility] [place]       This note was transcribed by Jesi Hsu MA. There may be transcription errors as a result, however minimal. I agree with transcription and every effort has been made to ensure accuracy of transcription, but " any obvious errors or omissions should be clarified with the author of the document.

## 2025-01-28 ENCOUNTER — ROUTINE PRENATAL (OUTPATIENT)
Dept: OBSTETRICS AND GYNECOLOGY | Facility: CLINIC | Age: 35
End: 2025-01-28
Payer: COMMERCIAL

## 2025-01-28 VITALS
DIASTOLIC BLOOD PRESSURE: 72 MMHG | TEMPERATURE: 98 F | BODY MASS INDEX: 30.63 KG/M2 | SYSTOLIC BLOOD PRESSURE: 110 MMHG | HEIGHT: 60 IN | WEIGHT: 156 LBS

## 2025-01-28 DIAGNOSIS — O28.9 ABNORMAL PRENATAL TEST: ICD-10-CM

## 2025-01-28 DIAGNOSIS — O09.299 PRIOR POOR OBSTETRICAL HISTORY, ANTEPARTUM: ICD-10-CM

## 2025-01-28 DIAGNOSIS — O30.031 MONOCHORIONIC DIAMNIOTIC TWIN GESTATION IN FIRST TRIMESTER: Primary | ICD-10-CM

## 2025-01-28 DIAGNOSIS — Z3A.19 19 WEEKS GESTATION OF PREGNANCY: ICD-10-CM

## 2025-01-28 DIAGNOSIS — O99.342 DEPRESSION DURING PREGNANCY IN SECOND TRIMESTER: ICD-10-CM

## 2025-01-28 DIAGNOSIS — F32.A DEPRESSION DURING PREGNANCY IN SECOND TRIMESTER: ICD-10-CM

## 2025-01-28 LAB
BILIRUB UR QL STRIP: NORMAL
GLUCOSE UR QL STRIP: NEGATIVE
KETONES UR QL STRIP: NORMAL
LEUKOCYTE ESTERASE UR QL STRIP: NEGATIVE
PH, POC UA: NORMAL
POC BLOOD, URINE: NORMAL
POC NITRATES, URINE: NEGATIVE
PROT UR QL STRIP: NEGATIVE
SP GR UR STRIP: NORMAL (ref 1–1.03)
UROBILINOGEN UR STRIP-ACNC: NORMAL (ref 0.3–2.2)

## 2025-01-28 PROCEDURE — 0502F SUBSEQUENT PRENATAL CARE: CPT | Mod: CPTII,,, | Performed by: OBSTETRICS & GYNECOLOGY

## 2025-01-28 RX ORDER — FERROUS SULFATE 324(65)MG
325 TABLET, DELAYED RELEASE (ENTERIC COATED) ORAL DAILY
COMMUNITY
Start: 2025-01-15

## 2025-01-30 DIAGNOSIS — O30.032 MONOCHORIONIC DIAMNIOTIC TWIN GESTATION IN SECOND TRIMESTER: Primary | ICD-10-CM

## 2025-02-06 ENCOUNTER — PROCEDURE VISIT (OUTPATIENT)
Dept: MATERNAL FETAL MEDICINE | Facility: CLINIC | Age: 35
End: 2025-02-06
Payer: COMMERCIAL

## 2025-02-06 ENCOUNTER — OFFICE VISIT (OUTPATIENT)
Dept: MATERNAL FETAL MEDICINE | Facility: CLINIC | Age: 35
End: 2025-02-06
Payer: COMMERCIAL

## 2025-02-06 VITALS
WEIGHT: 159.38 LBS | SYSTOLIC BLOOD PRESSURE: 118 MMHG | HEART RATE: 97 BPM | DIASTOLIC BLOOD PRESSURE: 74 MMHG | BODY MASS INDEX: 31.29 KG/M2 | HEIGHT: 60 IN

## 2025-02-06 DIAGNOSIS — O30.032 MONOCHORIONIC DIAMNIOTIC TWIN GESTATION IN SECOND TRIMESTER: ICD-10-CM

## 2025-02-06 DIAGNOSIS — O30.032 MONOCHORIONIC DIAMNIOTIC TWIN GESTATION IN SECOND TRIMESTER: Primary | ICD-10-CM

## 2025-02-06 DIAGNOSIS — O35.BXX2: ICD-10-CM

## 2025-02-06 DIAGNOSIS — O44.40 LOW LYING PLACENTA WITHOUT HEMORRHAGE, ANTEPARTUM: ICD-10-CM

## 2025-02-06 DIAGNOSIS — O09.299 PRIOR POOR OBSTETRICAL HISTORY, ANTEPARTUM: ICD-10-CM

## 2025-02-06 DIAGNOSIS — O28.9 ABNORMAL PRENATAL TEST: ICD-10-CM

## 2025-02-06 PROCEDURE — 1160F RVW MEDS BY RX/DR IN RCRD: CPT | Mod: CPTII,S$GLB,, | Performed by: OBSTETRICS & GYNECOLOGY

## 2025-02-06 PROCEDURE — 3078F DIAST BP <80 MM HG: CPT | Mod: CPTII,S$GLB,, | Performed by: OBSTETRICS & GYNECOLOGY

## 2025-02-06 PROCEDURE — 76816 OB US FOLLOW-UP PER FETUS: CPT | Mod: S$GLB,,, | Performed by: OBSTETRICS & GYNECOLOGY

## 2025-02-06 PROCEDURE — 99214 OFFICE O/P EST MOD 30 MIN: CPT | Mod: S$GLB,,, | Performed by: OBSTETRICS & GYNECOLOGY

## 2025-02-06 PROCEDURE — 76817 TRANSVAGINAL US OBSTETRIC: CPT | Mod: S$GLB,,, | Performed by: OBSTETRICS & GYNECOLOGY

## 2025-02-06 PROCEDURE — 3008F BODY MASS INDEX DOCD: CPT | Mod: CPTII,S$GLB,, | Performed by: OBSTETRICS & GYNECOLOGY

## 2025-02-06 PROCEDURE — 1159F MED LIST DOCD IN RCRD: CPT | Mod: CPTII,S$GLB,, | Performed by: OBSTETRICS & GYNECOLOGY

## 2025-02-06 PROCEDURE — 3074F SYST BP LT 130 MM HG: CPT | Mod: CPTII,S$GLB,, | Performed by: OBSTETRICS & GYNECOLOGY

## 2025-02-06 NOTE — PROGRESS NOTES
Maternal Fetal Medicine follow up consult    SUBJECTIVE:     Rocky Burch is a 34 y.o.  female with IUP at 20w3d who is seen in follow up consultation by MFM.  Pregnancy complications include:   Problem   - Low lying placenta, antepartum   - Monochorionic diamniotic twin gestation in second trimester   - Prior poor obstetrical history, antepartum   - Abnormal prenatal test     Rocky presents for routine follow up appointment.  Denies LOF, VB, contractions. Positive fetal movement.  She reports 4-5 significant nose bleeds that lasted >20 mins each. Advised stop ASA 81mg (contraindicated), humidifier and nasal saline.    Previous notes reviewed.   No changes to medical, surgical, family, social, or obstetric history.  Interval history since last MFM visit: see above  Medications reviewed.    Care team members:  Dr Gibson - Primary OB     OBJECTIVE:   /74 (BP Location: Right arm, Patient Position: Sitting)   Pulse 97   Ht 5' (1.524 m)   Wt 72.3 kg (159 lb 6.3 oz)   LMP 2024 (Exact Date)   BMI 31.13 kg/m²     Ultrasound performed. See viewpoint for full ultrasound report.    Monochorionic diamniotic twin intrauterine pregnancy is identified with two living fetuses.   Baby A-  estimated fetal weight is 391g (38%). Anatomic survey is normal with limited transverse spine only. MVP is 5.4cm.   Baby B- estimated fetal weight is 338g (21%).  Anatomic survey shows echogenic foci of the left ventricle with suboptimal septum, AA, DA. MVP is 3.8cm.  The fetal sex is congruent and the dividing membrane is thin, in agreement with stated monochorionic diamniotic twins.  Stomachs and bladders are visible.  Fetal growth is concordant with 14% difference.   The placenta is anterior and low lying at 1.1cm away from internal os. Transvaginal cervical length measures 3.5cm.      ASSESSMENT/PLAN:     34 y.o.  female with IUP at 20w3d    - Monochorionic diamniotic twin gestation in second trimester  We discussed  monochorionic twinning and reviewed the types of placentation seen and their frequency. I counseled her on the increased incidence of preeclampsia/gestational hypertension, gestational diabetes, fetal growth restriction, anemia, congenital anomalies, need for antepartum hospitalization,  labor/PPROM, stillbirth, and risk of postpartum hemorrhage that can occur in twin pregnancies. It was explained that all monochorionic twins, have a single placenta and that this puts them at risk for twin transfusion syndrome (TTTS). We discussed twin-transfusion syndrome which is seen in ~ 10-15% of monochorionic twins due to unbalanced vascular communications within the placenta and can result in discordant fetal growth and fluid volume. We discussed that in its severe form, it can potentially result in  delivery or fetal morbidity or mortality due to poor placental function of the smaller twin and volume overload and cardiomyopathy of the larger twin. Treatment may consist of observation or laser photocoagulation of communicating vessels depending upon the circumstances and timing of the presentation. I reviewed with the patient the need for fetal ultrasound assessment for TTTS surveillance every two weeks starting at 16 weeks.     25- no abnormalities noted on ultrasound today.  Normal-appearing monochorionic diamniotic gestation with no signs of TTTS.    Recommendations:   Fetal ultrasound assessment every two weeks, starting at 16 weeks until delivery for TTTS surveillance (scheduled with MFM)  If high suspicion for evolving TTTS, increase ultrasound surveillance to once or twice weekly  Transvaginal cervical length screening at time of targeted anatomy ultrasound w/ MFM  Detailed anatomy surveys at 18-20 weeks (scheduled with MFM)  Fetal growth ultrasounds every 3-4 weeks starting at 23-24 weeks (scheduled with MFM)  Fetal echocardiogram at 22-24 weeks (scheduled 3/11)  stopped due to nose bleedsFolic acid  1 mg daily and ferrous sulfate 325 mg daily  Increase daily dietary intake by approximately 300 kcal above that for a rodrigues pregnancy, or 600 kcal over that of a nonpregnant woman and monitor weight gain   testing with weekly NST and AFV assessment beginning at 32 weeks (can alternate with MFM every other week)  Given the increased risks of a twin pregnancy, prenatal visits every 2-3 weeks from 24 - 32/34 weeks and weekly prenatal visits thereafter    Delivery timing:   Normal growth, no complications: 37 0/7 - 37 6/7 weeks gestation   Normal growth, maternal comorbidities: 36 0/7 - 36 6/7 weeks gestation   FGR of one or both: 34 0/7 - 35 6/7 weeks gestation  History of TTTS, FGR with abnormal UA Doppler, oligohydramnios, or comorbid conditions: 32 0/7 - 34 6/7 weeks gestation    Comorbid conditions include: BMI >= 40, diabetes, hypertension, and complex maternal medical conditions associated with placental dysfunction (Lupus, renal disease, or other vascular disease).      - Prior poor obstetrical history, antepartum  I reviewed the patient's obstetric history which is remarkable for a previous stillbirth at 33 weeks gestation. Obstetric history remarkable for: Trisomy 18 with that pregnancy. Multiple anomalies noted on prenatal ultrasound consistent with diagnosis.    I counseled the patient regarding the risk for recurrent stillbirth. The recurrence risk depends on the underlying etiology. The cause of her previous pregnancy loss was Trisomy 18 as this diagnosis is incompatible with life. We have discussed the recurrence risk of chromosome abnormalities in subsequent pregnancies is estimated around 1% until age related risk exceeds this estimation.    Recommendations:  Genetic testing in all subsequent pregnancies (see separate documentation)      - Abnormal prenatal test  Screening for common fetal aneuploidies is typically completed via cell-free DNA testing.  In all people, small pieces of genetic  "material (DNA) that are not contained within cells are present in the blood stream. During pregnancy, these small pieces of DNA are a mixture of the mother's DNA and DNA shed from the placenta. This test can indicate if there is an abnormal number of chromosomes 21, 18, 13, X, and Y. The approximate detection rate is 99% for Trisomy 21, 98% for Trisomy 18, 91% for Trisomy 13, and 90% for George Syndrome. The false positive rates are low ranging from 1/1000 to 2/1000.     This test may also incidentally determine other sources of aneuploidy which may not be fetal in origin. Inherent in this test is the chance to identify placental mosaicism which can occur about 1-2% of the time.     NIPT results via [employer] concluded fetal free DNA detected is monozygotic. However, no fetal fraction, gender, or risk of chromosome abnormalities reported. 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 This finding could also be due to normal variation and/or confined to placental tissue... Repeat cell-free DNA testing is not recommended..."    We have discussed the uncertainty of this test with multiple possibilities, one of which includes normal variation. We have discussed invasive testing options as well as risks/benefits/alternatives of each. It is possible that she or her partner are carrying a balanced translocation that increases their risk of aneuploidy. She and her  have not yet undergone parental karyotype to evaluate this. Genetic testing result from her baby "Suzanne" with trisomy 18 was not available today (she will try to locate it) but it may be helpful to understand if this is the case based on the type of test it was. Currently the NIPT findings suggest a possible abnormality but US is reassuring and could be confined to placenta.    At this time, she wishes to hold off on further testing. She " "will notify our clinic if she changes her mind or would like to proceed with diagnostic genetic testing. She is considering amniocentesis at 15-16 weeks.  She is interested in genetic counseling. We will refer to Madison Patino. Her case was discussed with genetics counselor today.    1/9/25- Had consult w/ genetics counselor (Madison). Politely declines any further testing at this time.     - Low lying placenta, antepartum  She has a low lying  anterior placenta previa. Most will "migrate" away from the cervix with advancing EGA.   Denies vaginal bleeding. No prior uterine surgeries.   We will continue to monitor placental location at future scans.      Echogenic focus of heart of fetus affecting antepartum care of mother, fetus 2  We discussed the significance of an echogenic focus (EIF) in the ventricle of the fetal heart. This is not a true structural abnormality and is not associated with congenital heart disease or abnormal cardiac function. It is seen as a normal variant in about 5 - 7% of pregnancies. Down syndrome fetuses have a higher incidence of echogenic foci than normal fetuses, therefore it is considered to be a potential marker for fetal Down syndrome. In a woman with other risk factors for Down syndrome (advanced maternal age, elevated quad screen risk or presence of other markers), the presence of an echogenic focus may increase the relative risk of Down syndrome slightly. In a younger woman (< age 35) or in a woman without other risk factors or markers for Down syndrome, the significance of an isolated echogenic focus is minimal. The overwhelming majority (99%) of these fetuses will not have Down syndrome. The presence of an EIF is an indication for a targeted anatomic survey, which was done today. No fetal major structural malformations or other minor markers associated with Down Syndrome were identified. Serum screening, either with a quad screen or cfDNA testing, can be used to provide a " quantitative estimate of the chance for fetal Down Syndrome. She has completed NIPT with no elevated risk of DS.          F/u in 2 weeks for TTTS surveillance  F/u in 4 weeks for MFM visit /growth ultrasound    Citlaly Caro MD  Maternal Fetal Medicine

## 2025-02-06 NOTE — ASSESSMENT & PLAN NOTE
"Screening for common fetal aneuploidies is typically completed via cell-free DNA testing.  In all people, small pieces of genetic material (DNA) that are not contained within cells are present in the blood stream. During pregnancy, these small pieces of DNA are a mixture of the mother's DNA and DNA shed from the placenta. This test can indicate if there is an abnormal number of chromosomes 21, 18, 13, X, and Y. The approximate detection rate is 99% for Trisomy 21, 98% for Trisomy 18, 91% for Trisomy 13, and 90% for George Syndrome. The false positive rates are low ranging from 1/1000 to 2/1000.     This test may also incidentally determine other sources of aneuploidy which may not be fetal in origin. Inherent in this test is the chance to identify placental mosaicism which can occur about 1-2% of the time.     NIPT results via [employer] concluded fetal free DNA detected is monozygotic. However, no fetal fraction, gender, or risk of chromosome abnormalities reported. 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 This finding could also be due to normal variation and/or confined to placental tissue... Repeat cell-free DNA testing is not recommended..."    We have discussed the uncertainty of this test with multiple possibilities, one of which includes normal variation. We have discussed invasive testing options as well as risks/benefits/alternatives of each. It is possible that she or her partner are carrying a balanced translocation that increases their risk of aneuploidy. She and her  have not yet undergone parental karyotype to evaluate this. Genetic testing result from her baby "Suzanne" with trisomy 18 was not available today (she will try to locate it) but it may be helpful to understand if this is the case based on the type of test it was. Currently the NIPT findings suggest a possible " abnormality but US is reassuring and could be confined to placenta.    At this time, she wishes to hold off on further testing. She will notify our clinic if she changes her mind or would like to proceed with diagnostic genetic testing. She is considering amniocentesis at 15-16 weeks.  She is interested in genetic counseling. We will refer to Madison Patino. Her case was discussed with genetics counselor today.    1/9/25- Had consult w/ genetics counselor (Madison). Politely declines any further testing at this time.

## 2025-02-06 NOTE — ASSESSMENT & PLAN NOTE
"She has a low lying  anterior placenta previa. Most will "migrate" away from the cervix with advancing EGA.   Denies vaginal bleeding. No prior uterine surgeries.   We will continue to monitor placental location at future scans.    "

## 2025-02-06 NOTE — ASSESSMENT & PLAN NOTE
We discussed monochorionic twinning and reviewed the types of placentation seen and their frequency. I counseled her on the increased incidence of preeclampsia/gestational hypertension, gestational diabetes, fetal growth restriction, anemia, congenital anomalies, need for antepartum hospitalization,  labor/PPROM, stillbirth, and risk of postpartum hemorrhage that can occur in twin pregnancies. It was explained that all monochorionic twins, have a single placenta and that this puts them at risk for twin transfusion syndrome (TTTS). We discussed twin-transfusion syndrome which is seen in ~ 10-15% of monochorionic twins due to unbalanced vascular communications within the placenta and can result in discordant fetal growth and fluid volume. We discussed that in its severe form, it can potentially result in  delivery or fetal morbidity or mortality due to poor placental function of the smaller twin and volume overload and cardiomyopathy of the larger twin. Treatment may consist of observation or laser photocoagulation of communicating vessels depending upon the circumstances and timing of the presentation. I reviewed with the patient the need for fetal ultrasound assessment for TTTS surveillance every two weeks starting at 16 weeks.     25- no abnormalities noted on ultrasound today.  Normal-appearing monochorionic diamniotic gestation with no signs of TTTS.    Recommendations:   Fetal ultrasound assessment every two weeks, starting at 16 weeks until delivery for TTTS surveillance (scheduled with MFM)  If high suspicion for evolving TTTS, increase ultrasound surveillance to once or twice weekly  Transvaginal cervical length screening at time of targeted anatomy ultrasound w/ MFM  Detailed anatomy surveys at 18-20 weeks (scheduled with MFM)  Fetal growth ultrasounds every 3-4 weeks starting at 23-24 weeks (scheduled with MFM)  Fetal echocardiogram at 22-24 weeks (scheduled 3/11)  stopped due to nose  bleedsFolic acid 1 mg daily and ferrous sulfate 325 mg daily  Increase daily dietary intake by approximately 300 kcal above that for a rodrigues pregnancy, or 600 kcal over that of a nonpregnant woman and monitor weight gain   testing with weekly NST and AFV assessment beginning at 32 weeks (can alternate with MFM every other week)  Given the increased risks of a twin pregnancy, prenatal visits every 2-3 weeks from 24 - 32/34 weeks and weekly prenatal visits thereafter    Delivery timing:   Normal growth, no complications: 37 0/7 - 37 6/7 weeks gestation   Normal growth, maternal comorbidities: 36 0/7 - 36 6/7 weeks gestation   FGR of one or both: 34 0/7 - 35 6/7 weeks gestation  History of TTTS, FGR with abnormal UA Doppler, oligohydramnios, or comorbid conditions: 32 0/7 - 34 6/7 weeks gestation    Comorbid conditions include: BMI >= 40, diabetes, hypertension, and complex maternal medical conditions associated with placental dysfunction (Lupus, renal disease, or other vascular disease).

## 2025-02-07 NOTE — ASSESSMENT & PLAN NOTE
We discussed the significance of an echogenic focus (EIF) in the ventricle of the fetal heart. This is not a true structural abnormality and is not associated with congenital heart disease or abnormal cardiac function. It is seen as a normal variant in about 5 - 7% of pregnancies. Down syndrome fetuses have a higher incidence of echogenic foci than normal fetuses, therefore it is considered to be a potential marker for fetal Down syndrome. In a woman with other risk factors for Down syndrome (advanced maternal age, elevated quad screen risk or presence of other markers), the presence of an echogenic focus may increase the relative risk of Down syndrome slightly. In a younger woman (< age 35) or in a woman without other risk factors or markers for Down syndrome, the significance of an isolated echogenic focus is minimal. The overwhelming majority (99%) of these fetuses will not have Down syndrome. The presence of an EIF is an indication for a targeted anatomic survey, which was done today. No fetal major structural malformations or other minor markers associated with Down Syndrome were identified. Serum screening, either with a quad screen or cfDNA testing, can be used to provide a quantitative estimate of the chance for fetal Down Syndrome. She has completed NIPT with no elevated risk of DS.

## 2025-02-20 ENCOUNTER — PROCEDURE VISIT (OUTPATIENT)
Dept: MATERNAL FETAL MEDICINE | Facility: CLINIC | Age: 35
End: 2025-02-20
Payer: COMMERCIAL

## 2025-02-20 VITALS — DIASTOLIC BLOOD PRESSURE: 75 MMHG | SYSTOLIC BLOOD PRESSURE: 125 MMHG | HEART RATE: 106 BPM

## 2025-02-20 DIAGNOSIS — O30.032 MONOCHORIONIC DIAMNIOTIC TWIN GESTATION IN SECOND TRIMESTER: ICD-10-CM

## 2025-02-20 PROCEDURE — 76816 OB US FOLLOW-UP PER FETUS: CPT | Mod: 59,S$GLB,, | Performed by: OBSTETRICS & GYNECOLOGY

## 2025-02-27 ENCOUNTER — ROUTINE PRENATAL (OUTPATIENT)
Dept: OBSTETRICS AND GYNECOLOGY | Facility: CLINIC | Age: 35
End: 2025-02-27
Payer: COMMERCIAL

## 2025-02-27 VITALS
DIASTOLIC BLOOD PRESSURE: 78 MMHG | HEART RATE: 100 BPM | WEIGHT: 167 LBS | SYSTOLIC BLOOD PRESSURE: 120 MMHG | BODY MASS INDEX: 32.61 KG/M2

## 2025-02-27 DIAGNOSIS — O99.343 DEPRESSION DURING PREGNANCY IN THIRD TRIMESTER: ICD-10-CM

## 2025-02-27 DIAGNOSIS — O28.9 ABNORMAL PRENATAL TEST: ICD-10-CM

## 2025-02-27 DIAGNOSIS — O30.032 MONOCHORIONIC DIAMNIOTIC TWIN GESTATION IN SECOND TRIMESTER: Primary | ICD-10-CM

## 2025-02-27 DIAGNOSIS — F32.A DEPRESSION DURING PREGNANCY IN THIRD TRIMESTER: ICD-10-CM

## 2025-02-27 DIAGNOSIS — O09.299 PRIOR POOR OBSTETRICAL HISTORY, ANTEPARTUM: ICD-10-CM

## 2025-02-27 DIAGNOSIS — Z3A.23 23 WEEKS GESTATION OF PREGNANCY: ICD-10-CM

## 2025-02-27 LAB
BILIRUB UR QL STRIP: NEGATIVE
GLUCOSE UR QL STRIP: NEGATIVE
KETONES UR QL STRIP: NEGATIVE
LEUKOCYTE ESTERASE UR QL STRIP: NEGATIVE
PH, POC UA: 6.6
POC BLOOD, URINE: NEGATIVE
POC NITRATES, URINE: NEGATIVE
PROT UR QL STRIP: NEGATIVE
SP GR UR STRIP: 1.02 (ref 1–1.03)
UROBILINOGEN UR STRIP-ACNC: 0.2 (ref 0.1–1.1)

## 2025-02-27 NOTE — PROGRESS NOTES
HPI  Rocky Burch is a 34 y.o.  23w3d here for Routine Prenatal Visit , C/O'S OF HAVING LEFT HIP PAIN WHEN ON HER FEET TOO MUCH, HAD AN ULTRASOUND LAST WEEK WITH [facility] AND WILL SEE THEM AGAIN FOR A VISIT NEXT WEEK. THE [facility] DR TOOK HER OFF OF ASPIRIN DUE TO HER HAVING REALLY BAD NOSEBLEEDS.     Denies VB and pelvic pain.    Rocky's allergies were reviewed and updated as appropriate.    Past Medical History:   Diagnosis Date    Anxiety disorder, unspecified     Constipation     Fetal demise, greater than 22 weeks, antepartum, fetus 1 2023    Maternal care for (suspected) chromosomal abnormality in fetus, trisomy 18, not applicable or unspecified 2023    Molar pregnancy     Rh negative state in antepartum period, third trimester 2023    Stillborn, abnormal      Family History   Problem Relation Name Age of Onset    Depression Father      Hypertension Father      Gestational diabetes Mother       Social History[1]  OB History    Para Term  AB Living   3 1  1 1    SAB IAB Ectopic Multiple Live Births   1 0  0       # Outcome Date GA Lbr José Miguel/2nd Weight Sex Type Anes PTL Lv   3 Current            2 SAB 23     SAB         Birth Comments: molar pregnancy   1  23 33w2d 02:25 / 00:18 0.959 kg (2 lb 1.8 oz) F Vag-Spont None  FD      Complications: Trisomy 13, unspecified     Current Medications[2]    ROS:  A comprehensive review of symptoms was completed and negative except as noted above.    Physical Exam:  Chaperone present for exam.    /78   Pulse 100   Wt 75.8 kg (167 lb)   LMP 2024 (Exact Date)   BMI 32.61 kg/m²     Gen: No distress.  Abdomen: Gravid, non-tender.  Pelvic: DEFERRED  Extremities: No edema.    Fundal Height (cm): 26 cm  Fetal Heart Rate: 144 (BABY B) 153 (BABY A)  Movement: Present    Recent Results (from the past 24 hours)   POCT Urinalysis, Dipstick, Automated, W/O Scope    Collection Time: 25  1:54 PM   Result Value Ref Range     POC Blood, Urine Negative Negative    POC Bilirubin, Urine Negative Negative    POC Urobilinogen, Urine 0.2 0.1 - 1.1    POC Ketones, Urine Negative Negative    POC Protein, Urine Negative Negative    POC Nitrates, Urine Negative Negative    POC Glucose, Urine Negative Negative    pH, UA 6.6     POC Specific Gravity, Urine 1.025 1.003 - 1.029    POC Leukocytes, Urine Negative Negative     PRENATAL LABS:  ABO/Rh:   Lab Results   Component Value Date/Time    GROUPTRH O NEG 11/27/2024 09:24 AM    GROUPTRH O NEG 09/21/2022 12:00 AM    ABORH O NEG 09/19/2023 12:50 PM    ABSCREEN NEG 09/19/2023 12:50 PM    ABSCREEN Negative 08/22/2022 12:00 AM     H&H:  Lab Results   Component Value Date/Time    HGB 12.5 11/27/2024 09:24 AM    HGB 12.1 11/23/2022 12:00 AM    HCT 36.7 11/27/2024 09:24 AM    HCT 35 (A) 11/23/2022 12:00 AM     Platelet:  Lab Results   Component Value Date/Time     11/27/2024 09:24 AM     11/23/2022 12:00 AM     Osulivan:   Lab Results   Component Value Date/Time    TZZT5GR 50 (L) 11/27/2024 09:24 AM     HIV:   Lab Results   Component Value Date/Time    HIV Nonreactive 11/27/2024 09:24 AM    ILJ39HQCU Nonreactive 09/21/2022 12:00 AM     RPR:  Lab Results   Component Value Date/Time    RPR nonreactive 09/21/2022 12:00 AM    SYPHAB Nonreactive 11/27/2024 09:24 AM     Hepatitis B Surface Antigen:  Lab Results   Component Value Date/Time    HEPBSAG Negative 11/27/2024 09:24 AM    HEPBSAG Negative 09/21/2022 12:00 AM     Hepatitis C:  Lab Results   Component Value Date/Time    HEPCAB Nonreactive 11/27/2024 09:24 AM    HEPCAB Negative 09/21/2022 12:00 AM     Rubella Immune Status:  Lab Results   Component Value Date/Time    RUBELLAIMMUN Immune 09/21/2022 12:00 AM    RUBELLAIGG >150.00 11/27/2024 09:24 AM   Last PAP Date: 11/25/2024    ASSESSMENT/PLAN:  1. Monochorionic diamniotic twin gestation in second trimester  -     POCT Urinalysis, Dipstick, Automated, W/O Scope    2. 23 weeks gestation of  pregnancy    3. - Prior poor obstetrical history, antepartum    4. - Abnormal prenatal test    5. Depression during pregnancy in third trimester        Miscarriage precautions discussed with patient, as well as labor unit precautions.     Follow Up:  Follow up in about 4 weeks (around 3/27/2025) for OB VISIT.                   [1]   Social History  Tobacco Use    Smoking status: Never     Passive exposure: Never    Smokeless tobacco: Never   Substance Use Topics    Alcohol use: Not Currently     Comment: social    Drug use: Never   [2]   Current Outpatient Medications:     busPIRone (BUSPAR) 5 MG Tab, Take 5 mg by mouth 2 (two) times daily as needed., Disp: , Rfl:     calcium carbonate (OS-DEMETRI) 500 mg calcium (1,250 mg) tablet, Take 2 tablets (1,000 mg total) by mouth 2 (two) times daily., Disp: 60 tablet, Rfl: 5    ferrous sulfate 324 mg (65 mg iron) TbEC, Take 325 mg by mouth once daily., Disp: , Rfl:     prenatal vit no.124/iron/folic (PRENATAL VITAMIN ORAL), Take 1 tablet by mouth once daily., Disp: , Rfl:     ZINC ORAL, Take by mouth., Disp: , Rfl:     aspirin 81 mg Cap, Take 81 mg by mouth once daily. (Patient not taking: Reported on 2/27/2025), Disp: , Rfl:

## 2025-03-03 ENCOUNTER — PATIENT MESSAGE (OUTPATIENT)
Dept: OTHER | Facility: OTHER | Age: 35
End: 2025-03-03
Payer: COMMERCIAL

## 2025-03-06 ENCOUNTER — PROCEDURE VISIT (OUTPATIENT)
Dept: MATERNAL FETAL MEDICINE | Facility: CLINIC | Age: 35
End: 2025-03-06
Payer: COMMERCIAL

## 2025-03-06 ENCOUNTER — OFFICE VISIT (OUTPATIENT)
Dept: MATERNAL FETAL MEDICINE | Facility: CLINIC | Age: 35
End: 2025-03-06
Payer: COMMERCIAL

## 2025-03-06 VITALS
HEIGHT: 60 IN | WEIGHT: 163.56 LBS | HEART RATE: 92 BPM | SYSTOLIC BLOOD PRESSURE: 122 MMHG | DIASTOLIC BLOOD PRESSURE: 73 MMHG | BODY MASS INDEX: 32.11 KG/M2

## 2025-03-06 DIAGNOSIS — O30.032 MONOCHORIONIC DIAMNIOTIC TWIN GESTATION IN SECOND TRIMESTER: ICD-10-CM

## 2025-03-06 DIAGNOSIS — O44.40 LOW LYING PLACENTA WITHOUT HEMORRHAGE, ANTEPARTUM: ICD-10-CM

## 2025-03-06 DIAGNOSIS — O09.299 PRIOR POOR OBSTETRICAL HISTORY, ANTEPARTUM: ICD-10-CM

## 2025-03-06 DIAGNOSIS — O28.9 ABNORMAL PRENATAL TEST: ICD-10-CM

## 2025-03-06 DIAGNOSIS — O30.032 MONOCHORIONIC DIAMNIOTIC TWIN GESTATION IN SECOND TRIMESTER: Primary | ICD-10-CM

## 2025-03-06 DIAGNOSIS — O35.BXX2: ICD-10-CM

## 2025-03-06 NOTE — ASSESSMENT & PLAN NOTE
"She has a low lying  anterior placenta previa. Most will "migrate" away from the cervix with advancing EGA.   Denies vaginal bleeding. No prior uterine surgeries.   We will continue to monitor placental location at future scans.    3/6/25- No longer low lying - normal anterior position.   "

## 2025-03-06 NOTE — PROGRESS NOTES
Maternal Fetal Medicine follow up consult    SUBJECTIVE:     Rocky Burch is a 34 y.o.  female with IUP at 24w3d who is seen in follow up consultation by MFM.  Pregnancy complications include:   Problem   - Low lying placenta, antepartum   - Twin B: EIF   - Monochorionic diamniotic twin gestation in second trimester   - Prior poor obstetrical history, antepartum   - Abnormal prenatal test     Rocky presents for routine follow up appointment.  Fetal echo scheduled 3/11/25  Sciatic pain has greatly improved with stretches utilizing a tennis ball. Feels great this week.  Denies LOF, VB, contractions. Positive fetal movement.    Previous notes reviewed.   No changes to medical, surgical, family, social, or obstetric history.  Interval history since last MFM visit: see above  Medications reviewed.    Care team members:  Dr Gibson - Primary OB     OBJECTIVE:   /73 (BP Location: Right arm, Patient Position: Sitting)   Pulse 92   Ht 5' (1.524 m)   Wt 74.2 kg (163 lb 9.3 oz)   LMP 2024 (Exact Date)   BMI 31.95 kg/m²     Ultrasound performed. See viewpoint for full ultrasound report.    Monochorionic diamniotic twin intrauterine pregnancy is identified with two living fetuses.   Baby A-  estimated fetal weight is 673g (38%). Anatomic survey is normal and complete. MVP is 4.6cm.   Baby B- estimated fetal weight is 652g (33%).  Anatomic survey shows echogenic foci of the left ventricle with suboptimal septum, AA, DA remain secondary to fetal position. MVP is 5.1cm.  The fetal sex is congruent and the dividing membrane is thin, in agreement with stated monochorionic diamniotic twins.  Stomachs and bladders are visible.  Fetal growth is concordant with 3% difference.   The placenta is anterior no longer low lying.     ASSESSMENT/PLAN:     34 y.o.  female with IUP at 24w3d    - Monochorionic diamniotic twin gestation in second trimester  We discussed monochorionic twinning and reviewed the types of  placentation seen and their frequency. I counseled her on the increased incidence of preeclampsia/gestational hypertension, gestational diabetes, fetal growth restriction, anemia, congenital anomalies, need for antepartum hospitalization,  labor/PPROM, stillbirth, and risk of postpartum hemorrhage that can occur in twin pregnancies. It was explained that all monochorionic twins, have a single placenta and that this puts them at risk for twin transfusion syndrome (TTTS). We discussed twin-transfusion syndrome which is seen in ~ 10-15% of monochorionic twins due to unbalanced vascular communications within the placenta and can result in discordant fetal growth and fluid volume. We discussed that in its severe form, it can potentially result in  delivery or fetal morbidity or mortality due to poor placental function of the smaller twin and volume overload and cardiomyopathy of the larger twin. Treatment may consist of observation or laser photocoagulation of communicating vessels depending upon the circumstances and timing of the presentation. I reviewed with the patient the need for fetal ultrasound assessment for TTTS surveillance every two weeks starting at 16 weeks.     25- no abnormalities noted on ultrasound today.  Normal-appearing monochorionic diamniotic gestation with no signs of TTTS.  3/6/25- Twin A (vtx, maternal left), Twin B (transverse, head maternal left). No evidence of TTTS - each baby appropriately grown.    Recommendations:   Fetal ultrasound assessment every two weeks, starting at 16 weeks until delivery for TTTS surveillance (scheduled with M)  If high suspicion for evolving TTTS, increase ultrasound surveillance to once or twice weekly  Transvaginal cervical length screening at time of targeted anatomy ultrasound w/ MFM  Detailed anatomy surveys at 18-20 weeks  Fetal growth ultrasounds every 3-4 weeks starting at 23-24 weeks (scheduled with M)  Fetal echocardiogram at 22-24  weeks (scheduled 3/11)  stopped due to nose bleedsFolic acid 1 mg daily and ferrous sulfate 325 mg daily  Increase daily dietary intake by approximately 300 kcal above that for a rodrigues pregnancy, or 600 kcal over that of a nonpregnant woman and monitor weight gain   testing with weekly NST and AFV assessment beginning at 32 weeks (can alternate with MFM every other week)  Given the increased risks of a twin pregnancy, prenatal visits every 2-3 weeks from 24 - 32/34 weeks and weekly prenatal visits thereafter    Delivery timing:   Normal growth, no complications: 37 0/7 - 37 6/7 weeks gestation   Normal growth, maternal comorbidities: 36 0/7 - 36 6/7 weeks gestation   FGR of one or both: 34 0/7 - 35 6/7 weeks gestation  History of TTTS, FGR with abnormal UA Doppler, oligohydramnios, or comorbid conditions: 32 0/7 - 34 6/7 weeks gestation    Comorbid conditions include: BMI >= 40, diabetes, hypertension, and complex maternal medical conditions associated with placental dysfunction (Lupus, renal disease, or other vascular disease).      - Prior poor obstetrical history, antepartum  I reviewed the patient's obstetric history which is remarkable for a previous stillbirth at 33 weeks gestation. Obstetric history remarkable for: Trisomy 18 with that pregnancy. Multiple anomalies noted on prenatal ultrasound consistent with diagnosis.    I counseled the patient regarding the risk for recurrent stillbirth. The recurrence risk depends on the underlying etiology. The cause of her previous pregnancy loss was Trisomy 18 as this diagnosis is incompatible with life. We have discussed the recurrence risk of chromosome abnormalities in subsequent pregnancies is estimated around 1% until age related risk exceeds this estimation.    Recommendations:  Genetic testing in all subsequent pregnancies (see separate documentation)      - Abnormal prenatal test  Screening for common fetal aneuploidies is typically completed via  "cell-free DNA testing.  In all people, small pieces of genetic material (DNA) that are not contained within cells are present in the blood stream. During pregnancy, these small pieces of DNA are a mixture of the mother's DNA and DNA shed from the placenta. This test can indicate if there is an abnormal number of chromosomes 21, 18, 13, X, and Y. The approximate detection rate is 99% for Trisomy 21, 98% for Trisomy 18, 91% for Trisomy 13, and 90% for George Syndrome. The false positive rates are low ranging from 1/1000 to 2/1000.     This test may also incidentally determine other sources of aneuploidy which may not be fetal in origin. Inherent in this test is the chance to identify placental mosaicism which can occur about 1-2% of the time.     NIPT results via Admittedly concluded fetal free DNA detected is monozygotic. However, no fetal fraction, gender, or risk of chromosome abnormalities reported. 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 This finding could also be due to normal variation and/or confined to placental tissue... Repeat cell-free DNA testing is not recommended..."    We have discussed the uncertainty of this test with multiple possibilities, one of which includes normal variation. We have discussed invasive testing options as well as risks/benefits/alternatives of each. It is possible that she or her partner are carrying a balanced translocation that increases their risk of aneuploidy. She and her  have not yet undergone parental karyotype to evaluate this. Genetic testing result from her baby "Suzanne" with trisomy 18 was not available today (she will try to locate it) but it may be helpful to understand if this is the case based on the type of test it was. Currently the NIPT findings suggest a possible abnormality but US is reassuring and could be confined to " "placenta.    At this time, she wishes to hold off on further testing. She will notify our clinic if she changes her mind or would like to proceed with diagnostic genetic testing. She is considering amniocentesis at 15-16 weeks.  She is interested in genetic counseling. We will refer to Madison Patino. Her case was discussed with genetics counselor today.    1/9/25- Had consult w/ genetics counselor (Madison). Politely declines any further testing at this time.     - Low lying placenta, antepartum  She has a low lying  anterior placenta previa. Most will "migrate" away from the cervix with advancing EGA.   Denies vaginal bleeding. No prior uterine surgeries.   We will continue to monitor placental location at future scans.    3/6/25- No longer low lying - normal anterior position.     - Twin B: EIF  We discussed the significance of an echogenic focus (EIF) in the ventricle of the fetal heart. This is not a true structural abnormality and is not associated with congenital heart disease or abnormal cardiac function. It is seen as a normal variant in about 5 - 7% of pregnancies. Down syndrome fetuses have a higher incidence of echogenic foci than normal fetuses, therefore it is considered to be a potential marker for fetal Down syndrome. In a woman with other risk factors for Down syndrome (advanced maternal age, elevated quad screen risk or presence of other markers), the presence of an echogenic focus may increase the relative risk of Down syndrome slightly. In a younger woman (< age 35) or in a woman without other risk factors or markers for Down syndrome, the significance of an isolated echogenic focus is minimal. The overwhelming majority (99%) of these fetuses will not have Down syndrome. The presence of an EIF is an indication for a targeted anatomic survey, which was done today. No fetal major structural malformations or other minor markers associated with Down Syndrome were identified. Serum screening, either with " a quad screen or cfDNA testing, can be used to provide a quantitative estimate of the chance for fetal Down Syndrome. She has completed NIPT with no elevated risk of DS.     3/6/25- Twin B - EIF remains present. Anticipate spontaneous resolution in coming weeks.      F/u in 2 weeks for TTTS surveillance  F/u in 4 weeks for MFM visit /growth ultrasound    Citlaly Caro MD  Maternal Fetal Medicine

## 2025-03-06 NOTE — ASSESSMENT & PLAN NOTE
We discussed the significance of an echogenic focus (EIF) in the ventricle of the fetal heart. This is not a true structural abnormality and is not associated with congenital heart disease or abnormal cardiac function. It is seen as a normal variant in about 5 - 7% of pregnancies. Down syndrome fetuses have a higher incidence of echogenic foci than normal fetuses, therefore it is considered to be a potential marker for fetal Down syndrome. In a woman with other risk factors for Down syndrome (advanced maternal age, elevated quad screen risk or presence of other markers), the presence of an echogenic focus may increase the relative risk of Down syndrome slightly. In a younger woman (< age 35) or in a woman without other risk factors or markers for Down syndrome, the significance of an isolated echogenic focus is minimal. The overwhelming majority (99%) of these fetuses will not have Down syndrome. The presence of an EIF is an indication for a targeted anatomic survey, which was done today. No fetal major structural malformations or other minor markers associated with Down Syndrome were identified. Serum screening, either with a quad screen or cfDNA testing, can be used to provide a quantitative estimate of the chance for fetal Down Syndrome. She has completed NIPT with no elevated risk of DS.     3/6/25- Twin B - EIF remains present. Anticipate spontaneous resolution in coming weeks.

## 2025-03-06 NOTE — ASSESSMENT & PLAN NOTE
We discussed monochorionic twinning and reviewed the types of placentation seen and their frequency. I counseled her on the increased incidence of preeclampsia/gestational hypertension, gestational diabetes, fetal growth restriction, anemia, congenital anomalies, need for antepartum hospitalization,  labor/PPROM, stillbirth, and risk of postpartum hemorrhage that can occur in twin pregnancies. It was explained that all monochorionic twins, have a single placenta and that this puts them at risk for twin transfusion syndrome (TTTS). We discussed twin-transfusion syndrome which is seen in ~ 10-15% of monochorionic twins due to unbalanced vascular communications within the placenta and can result in discordant fetal growth and fluid volume. We discussed that in its severe form, it can potentially result in  delivery or fetal morbidity or mortality due to poor placental function of the smaller twin and volume overload and cardiomyopathy of the larger twin. Treatment may consist of observation or laser photocoagulation of communicating vessels depending upon the circumstances and timing of the presentation. I reviewed with the patient the need for fetal ultrasound assessment for TTTS surveillance every two weeks starting at 16 weeks.     25- no abnormalities noted on ultrasound today.  Normal-appearing monochorionic diamniotic gestation with no signs of TTTS.  3/6/25- Twin A (vtx, maternal left), Twin B (transverse, head maternal left). No evidence of TTTS - each baby appropriately grown.    Recommendations:   Fetal ultrasound assessment every two weeks, starting at 16 weeks until delivery for TTTS surveillance (scheduled with MFM)  If high suspicion for evolving TTTS, increase ultrasound surveillance to once or twice weekly  Transvaginal cervical length screening at time of targeted anatomy ultrasound w/ MFM  Detailed anatomy surveys at 18-20 weeks  Fetal growth ultrasounds every 3-4 weeks starting at  23-24 weeks (scheduled with MFM)  Fetal echocardiogram at 22-24 weeks (scheduled 3/11)  stopped due to nose bleedsFolic acid 1 mg daily and ferrous sulfate 325 mg daily  Increase daily dietary intake by approximately 300 kcal above that for a rodrigues pregnancy, or 600 kcal over that of a nonpregnant woman and monitor weight gain   testing with weekly NST and AFV assessment beginning at 32 weeks (can alternate with MFM every other week)  Given the increased risks of a twin pregnancy, prenatal visits every 2-3 weeks from 24 - 32/34 weeks and weekly prenatal visits thereafter    Delivery timing:   Normal growth, no complications: 37 0/7 - 37 6/7 weeks gestation   Normal growth, maternal comorbidities: 36 0/7 - 36 6/7 weeks gestation   FGR of one or both: 34 0/7 - 35 6/7 weeks gestation  History of TTTS, FGR with abnormal UA Doppler, oligohydramnios, or comorbid conditions: 32 0/7 - 34 6/7 weeks gestation    Comorbid conditions include: BMI >= 40, diabetes, hypertension, and complex maternal medical conditions associated with placental dysfunction (Lupus, renal disease, or other vascular disease).

## 2025-03-06 NOTE — ASSESSMENT & PLAN NOTE
"Screening for common fetal aneuploidies is typically completed via cell-free DNA testing.  In all people, small pieces of genetic material (DNA) that are not contained within cells are present in the blood stream. During pregnancy, these small pieces of DNA are a mixture of the mother's DNA and DNA shed from the placenta. This test can indicate if there is an abnormal number of chromosomes 21, 18, 13, X, and Y. The approximate detection rate is 99% for Trisomy 21, 98% for Trisomy 18, 91% for Trisomy 13, and 90% for George Syndrome. The false positive rates are low ranging from 1/1000 to 2/1000.     This test may also incidentally determine other sources of aneuploidy which may not be fetal in origin. Inherent in this test is the chance to identify placental mosaicism which can occur about 1-2% of the time.     NIPT results via [employer] concluded fetal free DNA detected is monozygotic. However, no fetal fraction, gender, or risk of chromosome abnormalities reported. 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 This finding could also be due to normal variation and/or confined to placental tissue... Repeat cell-free DNA testing is not recommended..."    We have discussed the uncertainty of this test with multiple possibilities, one of which includes normal variation. We have discussed invasive testing options as well as risks/benefits/alternatives of each. It is possible that she or her partner are carrying a balanced translocation that increases their risk of aneuploidy. She and her  have not yet undergone parental karyotype to evaluate this. Genetic testing result from her baby "Suzanne" with trisomy 18 was not available today (she will try to locate it) but it may be helpful to understand if this is the case based on the type of test it was. Currently the NIPT findings suggest a possible " abnormality but US is reassuring and could be confined to placenta.    At this time, she wishes to hold off on further testing. She will notify our clinic if she changes her mind or would like to proceed with diagnostic genetic testing. She is considering amniocentesis at 15-16 weeks.  She is interested in genetic counseling. We will refer to Madison Patino. Her case was discussed with genetics counselor today.    1/9/25- Had consult w/ genetics counselor (Madison). Politely declines any further testing at this time.

## 2025-03-11 ENCOUNTER — OFFICE VISIT (OUTPATIENT)
Dept: PEDIATRIC CARDIOLOGY | Facility: CLINIC | Age: 35
End: 2025-03-11
Payer: COMMERCIAL

## 2025-03-11 VITALS
BODY MASS INDEX: 32.28 KG/M2 | HEIGHT: 60 IN | WEIGHT: 164.44 LBS | HEART RATE: 106 BPM | SYSTOLIC BLOOD PRESSURE: 121 MMHG | DIASTOLIC BLOOD PRESSURE: 78 MMHG

## 2025-03-11 DIAGNOSIS — O35.BXX2: Primary | ICD-10-CM

## 2025-03-11 DIAGNOSIS — O30.032 MONOCHORIONIC DIAMNIOTIC TWIN GESTATION IN SECOND TRIMESTER: ICD-10-CM

## 2025-03-11 PROCEDURE — 3008F BODY MASS INDEX DOCD: CPT | Mod: CPTII,S$GLB,, | Performed by: PEDIATRICS

## 2025-03-11 PROCEDURE — 3074F SYST BP LT 130 MM HG: CPT | Mod: CPTII,S$GLB,, | Performed by: PEDIATRICS

## 2025-03-11 PROCEDURE — 99203 OFFICE O/P NEW LOW 30 MIN: CPT | Mod: 25,S$GLB,, | Performed by: PEDIATRICS

## 2025-03-11 PROCEDURE — 3078F DIAST BP <80 MM HG: CPT | Mod: CPTII,S$GLB,, | Performed by: PEDIATRICS

## 2025-03-11 PROCEDURE — 1159F MED LIST DOCD IN RCRD: CPT | Mod: CPTII,S$GLB,, | Performed by: PEDIATRICS

## 2025-03-11 RX ORDER — DOCUSATE SODIUM 100 MG/1
100 CAPSULE, LIQUID FILLED ORAL 2 TIMES DAILY
COMMUNITY

## 2025-03-11 NOTE — PROGRESS NOTES
[facility] Pediatric Cardiology Fetal Cardiology Clinic    Today, I had the pleasure of evaluating Rocky Burch who is now 34 y.o. and carrying her third pregnancy at 25 1/7 weeks gestation with an CRISELDA of 25. She was referred for evaluation of the fetal heart due mono-di twin gestation. No signs of twin-twin transfusion syndrome with similar fetal size on last MFM evaluation.     She is carrying a male fetuses to be named Wilbert and David. Pregnancy thus far has been otherwise uncomplicated.    Obstetric History:    .  Her OB history is notable for in utero demise (33wga, fetus with trisomy 13) and a molar pregnancy in .    Past Medical History:   Diagnosis Date    Anxiety disorder, unspecified     Constipation     Fetal demise, greater than 22 weeks, antepartum, fetus 1 2023    Maternal care for (suspected) chromosomal abnormality in fetus, trisomy 18, not applicable or unspecified 2023    Molar pregnancy     Rh negative state in antepartum period, third trimester 2023    Stillborn, abnormal        Current Medications[1]     Review of patient's allergies indicates:  No Known Allergies    Family History: Negative for congenital heart disease, early coronary artery disease, sudden unexplained death, connective tissues disorders, genetic syndromes, multiple miscarriages or other congenital anomalies.    Social History: Ms. Rocky Burch is  to the father of the baby.  The father of the baby is involved.     FETAL ECHOCARDIOGRAM (summary):  Fetal echo at 25 1/7wga, Fetus A, vertex, inferior  Normal fetal echocardiogram    Fetus B, transverse, superior  Normal fetal echocardiogram  (Full report in electronic medical record)    Impression:  Twin male fetuses at 25 1/7 wga.  Normal fetal echocardiograms.      Todays fetal echocardiograms are normal, within the limitations of fetal echocardiography.  I discussed with her that fetal echocardiography is insufficiently sensitive to  rule out all septal defects, anomalies of pulmonary and systemic veins, arch anomalies, and some valvar abnormalities, nor can it ensure that the ductus arteriosus and foramen ovale will spontaneously close.     Recommendations:  Location, timing, and mode of delivery will be determined by the obstetrical team.  She does not require further follow-up in the fetal echocardiography clinic, but I would be happy to see her again if additional questions or concerns arise.    Should there be any concerns about the baby's heart after birth, a post- echocardiogram and cardiology consultation are recommended.           Iliana Menard MD, MSCI  Pediatric Cardiology  Pediatric Echocardiography, Fetal Echocardiography, Cardiac MRI  [facility] [address] [place]  [address]  Phone [phone], Fax [phone]      The above information was discussed in detail including the use of diagrams, with 30 minutes of total face to face time, with greater than 50% with counseling and coordination of care.  The discussion of the diagnosis and treatment options is as described above.           [1]   Current Outpatient Medications:     busPIRone (BUSPAR) 5 MG Tab, Take 5 mg by mouth 2 (two) times daily as needed., Disp: , Rfl:     calcium carbonate (OS-DEMETRI) 500 mg calcium (1,250 mg) tablet, Take 2 tablets (1,000 mg total) by mouth 2 (two) times daily., Disp: 60 tablet, Rfl: 5    ferrous sulfate 324 mg (65 mg iron) TbEC, Take 325 mg by mouth once daily., Disp: , Rfl:     prenatal vit no.124/iron/folic (PRENATAL VITAMIN ORAL), Take 1 tablet by mouth once daily., Disp: , Rfl:     ZINC ORAL, Take by mouth., Disp: , Rfl:

## 2025-03-13 ENCOUNTER — ROUTINE PRENATAL (OUTPATIENT)
Dept: OBSTETRICS AND GYNECOLOGY | Facility: CLINIC | Age: 35
End: 2025-03-13
Payer: COMMERCIAL

## 2025-03-13 VITALS
WEIGHT: 168.38 LBS | HEART RATE: 82 BPM | DIASTOLIC BLOOD PRESSURE: 52 MMHG | BODY MASS INDEX: 32.89 KG/M2 | SYSTOLIC BLOOD PRESSURE: 120 MMHG

## 2025-03-13 DIAGNOSIS — O28.9 ABNORMAL PRENATAL TEST: ICD-10-CM

## 2025-03-13 DIAGNOSIS — O30.032 MONOCHORIONIC DIAMNIOTIC TWIN GESTATION IN SECOND TRIMESTER: Primary | ICD-10-CM

## 2025-03-13 DIAGNOSIS — O09.299 PRIOR POOR OBSTETRICAL HISTORY, ANTEPARTUM: ICD-10-CM

## 2025-03-13 DIAGNOSIS — Z67.91 RH NEGATIVE STATUS DURING PREGNANCY IN SECOND TRIMESTER: ICD-10-CM

## 2025-03-13 DIAGNOSIS — O26.892 RH NEGATIVE STATUS DURING PREGNANCY IN SECOND TRIMESTER: ICD-10-CM

## 2025-03-13 DIAGNOSIS — Z71.85 VACCINE COUNSELING: ICD-10-CM

## 2025-03-13 NOTE — PROGRESS NOTES
HPI  Rocky Burch is a 34 y.o.   25w3d here for Routine Prenatal Visit (NO C/O'S.)  VACCINE COUNSELING TODAY.  INFORMATION ON VACCINATIONS GIVEN PT. STATES WILL GET FLU VACCINE AT LOCAL PHARMACY. STATES DOES NOT WANT TO TAKE COVID VACCINE. STATES WILL TAKE T-DAP AT 28 WEEK VISIT .  Denies any VB, ROM, and PIH sxs. Reports active fetal movements.     Rocky's allergies were reviewed and updated as appropriate.    Past Medical History:   Diagnosis Date    Anxiety disorder, unspecified     Constipation     Fetal demise, greater than 22 weeks, antepartum, fetus 1 2023    Maternal care for (suspected) chromosomal abnormality in fetus, trisomy 18, not applicable or unspecified 2023    Molar pregnancy     Rh negative state in antepartum period, third trimester 2023    Stillborn, abnormal      Family History   Problem Relation Name Age of Onset    Depression Father      Hypertension Father      Gestational diabetes Mother       Social History[1]  OB History    Para Term  AB Living   3 1  1 1    SAB IAB Ectopic Multiple Live Births   1 0  0       # Outcome Date GA Lbr José Miguel/2nd Weight Sex Type Anes PTL Lv   3 Current            2 SAB 23     SAB         Birth Comments: molar pregnancy   1  23 33w2d 02:25 / 00:18 0.959 kg (2 lb 1.8 oz) F Vag-Spont None  FD      Complications: Trisomy 13, unspecified     Current Medications[2]    ROS:  A comprehensive review of symptoms was completed and negative except as noted above.    Physical Exam:    Chaperone present for exam.    BP (!) 120/52   Pulse 82   Wt 76.4 kg (168 lb 6.4 oz)   LMP 2024 (Exact Date)   BMI 32.89 kg/m²     Gen: No distress  Abdomen: Gravid, non-tender  Pelvic: DEFERRED  Extremities: No edema    Fetal Heart Rate: 155 (TWIN B)  Movement: Present    Recent Results (from the past 24 hours)   POCT Urinalysis, Dipstick, Automated, W/O Scope    Collection Time: 25  1:07 PM   Result Value Ref Range     POC Blood, Urine Negative Negative    POC Bilirubin, Urine Negative Negative    POC Urobilinogen, Urine 0.2 0.1 - 1.1    POC Ketones, Urine Positive (A) Negative    POC Protein, Urine Negative Negative    POC Nitrates, Urine Negative Negative    POC Glucose, Urine Negative Negative    pH, UA 7.0     POC Specific Gravity, Urine 1.015 1.003 - 1.029    POC Leukocytes, Urine Negative Negative     Last PAP Date: 11/25/2024    ASSESSMENT/PLAN:    1. Monochorionic diamniotic twin gestation in second trimester  -     POCT Urinalysis, Dipstick, Automated, W/O Scope    2. - Prior poor obstetrical history, antepartum    3. Rh negative status during pregnancy in second trimester    4. - Abnormal prenatal test    5. Vaccine counseling      Labor unit and pre-eclampsia precautions given.  Fetal kick count instructions given to patient.     Follow Up:  Follow up in about 3 weeks (around 4/3/2025) for OB VS RHOGAM 28 WEEK LABS T-DAP.             [1]   Social History  Tobacco Use    Smoking status: Never     Passive exposure: Never    Smokeless tobacco: Never   Substance Use Topics    Alcohol use: Not Currently     Comment: social    Drug use: Never   [2]   Current Outpatient Medications:     busPIRone (BUSPAR) 5 MG Tab, Take 5 mg by mouth 2 (two) times daily as needed., Disp: , Rfl:     calcium carbonate (OS-DEMETRI) 500 mg calcium (1,250 mg) tablet, Take 2 tablets (1,000 mg total) by mouth 2 (two) times daily., Disp: 60 tablet, Rfl: 5    docusate sodium (COLACE) 100 MG capsule, Take 100 mg by mouth 2 (two) times daily., Disp: , Rfl:     ferrous sulfate 324 mg (65 mg iron) TbEC, Take 325 mg by mouth once daily., Disp: , Rfl:     prenatal vit no.124/iron/folic (PRENATAL VITAMIN ORAL), Take 1 tablet by mouth once daily., Disp: , Rfl:     ZINC ORAL, Take by mouth., Disp: , Rfl:     lancets 28 gauge Misc, USE TO CHECK BLOOD SUGAR 5 TIMES A DAY AS DIRECTED BY THE PROVIDER, Disp: , Rfl:     TRUE METRIX AIR GLUCOSE METER Misc, 5 (five) times  daily., Disp: , Rfl:     TRUE METRIX GLUCOSE TEST STRIP Strp, 5 (five) times daily., Disp: , Rfl:

## 2025-03-17 ENCOUNTER — PATIENT MESSAGE (OUTPATIENT)
Dept: OTHER | Facility: OTHER | Age: 35
End: 2025-03-17
Payer: COMMERCIAL

## 2025-03-20 ENCOUNTER — PROCEDURE VISIT (OUTPATIENT)
Dept: MATERNAL FETAL MEDICINE | Facility: CLINIC | Age: 35
End: 2025-03-20
Payer: COMMERCIAL

## 2025-03-20 VITALS — HEART RATE: 111 BPM | SYSTOLIC BLOOD PRESSURE: 123 MMHG | DIASTOLIC BLOOD PRESSURE: 73 MMHG

## 2025-03-20 DIAGNOSIS — O30.032 MONOCHORIONIC DIAMNIOTIC TWIN GESTATION IN SECOND TRIMESTER: ICD-10-CM

## 2025-03-20 DIAGNOSIS — O30.032 MONOCHORIONIC DIAMNIOTIC TWIN GESTATION IN SECOND TRIMESTER: Primary | ICD-10-CM

## 2025-03-20 PROCEDURE — 76820 UMBILICAL ARTERY ECHO: CPT | Mod: 59,S$GLB,, | Performed by: OBSTETRICS & GYNECOLOGY

## 2025-03-20 PROCEDURE — 76816 OB US FOLLOW-UP PER FETUS: CPT | Mod: 59,S$GLB,, | Performed by: OBSTETRICS & GYNECOLOGY

## 2025-03-20 PROCEDURE — 76821 MIDDLE CEREBRAL ARTERY ECHO: CPT | Mod: 59,S$GLB,, | Performed by: OBSTETRICS & GYNECOLOGY

## 2025-03-27 ENCOUNTER — ROUTINE PRENATAL (OUTPATIENT)
Dept: OBSTETRICS AND GYNECOLOGY | Facility: CLINIC | Age: 35
End: 2025-03-27
Payer: COMMERCIAL

## 2025-03-27 VITALS
DIASTOLIC BLOOD PRESSURE: 76 MMHG | SYSTOLIC BLOOD PRESSURE: 122 MMHG | WEIGHT: 169.5 LBS | HEART RATE: 94 BPM | BODY MASS INDEX: 33.1 KG/M2

## 2025-03-27 DIAGNOSIS — O30.032 MONOCHORIONIC DIAMNIOTIC TWIN GESTATION IN SECOND TRIMESTER: Primary | ICD-10-CM

## 2025-03-27 DIAGNOSIS — O99.342 DEPRESSION DURING PREGNANCY IN SECOND TRIMESTER: ICD-10-CM

## 2025-03-27 DIAGNOSIS — O26.892 RH NEGATIVE STATUS DURING PREGNANCY IN SECOND TRIMESTER: ICD-10-CM

## 2025-03-27 DIAGNOSIS — F32.A DEPRESSION DURING PREGNANCY IN SECOND TRIMESTER: ICD-10-CM

## 2025-03-27 DIAGNOSIS — Z67.91 RH NEGATIVE STATUS DURING PREGNANCY IN SECOND TRIMESTER: ICD-10-CM

## 2025-03-27 DIAGNOSIS — Z3A.27 27 WEEKS GESTATION OF PREGNANCY: ICD-10-CM

## 2025-03-27 NOTE — PROGRESS NOTES
HPI  Rocky Burch is a 34 y.o.   27w3d here for Routine Prenatal Visit (PT PRESENTS TO CLINIC FOR ROUTINE OB VISIT. NO COMPLAINTS.)  PT WILL GET T-DAP ON HER NEXT VISIT IN 2 WEEKS  FETAL ECHOS WERE NL  HAD MFM APPT NEXT WEEK.    Denies any VB, ROM, and PIH sxs. Reports active fetal movements.     Rocky's allergies were reviewed and updated as appropriate.    Past Medical History:   Diagnosis Date    Anxiety disorder, unspecified     Constipation     Fetal demise, greater than 22 weeks, antepartum, fetus 1 2023    Maternal care for (suspected) chromosomal abnormality in fetus, trisomy 18, not applicable or unspecified 2023    Molar pregnancy     Rh negative state in antepartum period, third trimester 2023    Stillborn, abnormal      Family History   Problem Relation Name Age of Onset    Depression Father      Hypertension Father      Gestational diabetes Mother       Social History[1]  OB History    Para Term  AB Living   3 1  1 1    SAB IAB Ectopic Multiple Live Births   1 0  0       # Outcome Date GA Lbr José Miguel/2nd Weight Sex Type Anes PTL Lv   3 Current            2 SAB 23     SAB         Birth Comments: molar pregnancy   1  23 33w2d 02:25 / 00:18 0.959 kg (2 lb 1.8 oz) F Vag-Spont None  FD      Complications: Trisomy 13, unspecified     Current Medications[2]    ROS:  A comprehensive review of symptoms was completed and negative except as noted above.    Physical Exam:    Chaperone present for exam.    /76   Pulse 94   Wt 76.9 kg (169 lb 8 oz)   LMP 2024 (Exact Date)   BMI 33.10 kg/m²     Gen: No distress  Abdomen: Gravid, non-tender  Pelvic:   Extremities: No edema    Fetal Heart Rate: 162 (BABY B)  Movement: Present    Recent Results (from the past 24 hours)   POCT Urinalysis, Dipstick, Automated, W/O Scope    Collection Time: 25  1:29 PM   Result Value Ref Range    POC Blood, Urine Negative Negative    POC Bilirubin, Urine Negative  Negative    POC Urobilinogen, Urine 0.2 0.1 - 1.1    POC Ketones, Urine Negative Negative    POC Protein, Urine Negative Negative    POC Nitrates, Urine Negative Negative    POC Glucose, Urine Negative Negative    pH, UA 7.0     POC Specific Gravity, Urine 1.025 1.003 - 1.029    POC Leukocytes, Urine Negative Negative       Last PAP Date: 11/25/2024    ASSESSMENT/PLAN:    1. Monochorionic diamniotic twin gestation in second trimester  -     Type & Screen; Future; Expected date: 03/31/2025  -     SYPHILIS ANTIBODY (WITH REFLEX RPR); Future; Expected date: 03/31/2025  -     GTT 1 Hour; Future; Expected date: 03/31/2025  -     CBC Without Differential; Future; Expected date: 03/31/2025  -     POCT Urinalysis, Dipstick, Automated, W/O Scope  PT WILL SEE MFM NEXT WEEK FOR GROWTH SCANS.  PT WILL NO LONGER BE WORKING IN THE AFTERNOONS.    2. 27 weeks gestation of pregnancy    3. Rh negative status during pregnancy in second trimester  -     Prepare Rh Immune Globulin; Future; Expected date: 03/31/2025  -     Type & Screen; Future; Expected date: 03/31/2025    4. Depression during pregnancy in second trimester    Labor unit and pre-eclampsia precautions given.  Fetal kick count instructions given to patient.     Follow Up:  Follow up in about 2 weeks (around 4/10/2025) for OB VISIT.            [1]   Social History  Tobacco Use    Smoking status: Never     Passive exposure: Never    Smokeless tobacco: Never   Substance Use Topics    Alcohol use: Not Currently     Comment: social    Drug use: Never   [2]   Current Outpatient Medications:     calcium carbonate (OS-DEMETRI) 500 mg calcium (1,250 mg) tablet, Take 2 tablets (1,000 mg total) by mouth 2 (two) times daily., Disp: 60 tablet, Rfl: 5    docusate sodium (COLACE) 100 MG capsule, Take 100 mg by mouth 2 (two) times daily., Disp: , Rfl:     ferrous sulfate 324 mg (65 mg iron) TbEC, Take 325 mg by mouth once daily., Disp: , Rfl:     prenatal vit no.124/iron/folic (PRENATAL  VITAMIN ORAL), Take 1 tablet by mouth once daily., Disp: , Rfl:     ZINC ORAL, Take by mouth., Disp: , Rfl:     busPIRone (BUSPAR) 5 MG Tab, Take 5 mg by mouth 2 (two) times daily as needed. (Patient not taking: Reported on 3/27/2025), Disp: , Rfl:

## 2025-03-31 ENCOUNTER — PATIENT MESSAGE (OUTPATIENT)
Dept: OTHER | Facility: OTHER | Age: 35
End: 2025-03-31
Payer: COMMERCIAL

## 2025-03-31 PROBLEM — O20.9 VAGINAL BLEEDING AFFECTING EARLY PREGNANCY: Status: RESOLVED | Noted: 2023-09-20 | Resolved: 2025-03-31

## 2025-03-31 PROBLEM — O30.033 MONOCHORIONIC DIAMNIOTIC TWIN GESTATION IN THIRD TRIMESTER: Status: ACTIVE | Noted: 2024-12-10

## 2025-03-31 PROBLEM — O44.40 LOW LYING PLACENTA WITHOUT HEMORRHAGE, ANTEPARTUM: Status: RESOLVED | Noted: 2025-02-06 | Resolved: 2025-03-31

## 2025-03-31 NOTE — ASSESSMENT & PLAN NOTE
We discussed monochorionic twinning and reviewed the types of placentation seen and their frequency. I counseled her on the increased incidence of preeclampsia/gestational hypertension, gestational diabetes, fetal growth restriction, anemia, congenital anomalies, need for antepartum hospitalization,  labor/PPROM, stillbirth, and risk of postpartum hemorrhage that can occur in twin pregnancies. It was explained that all monochorionic twins, have a single placenta and that this puts them at risk for twin transfusion syndrome (TTTS). We discussed twin-transfusion syndrome which is seen in ~ 10-15% of monochorionic twins due to unbalanced vascular communications within the placenta and can result in discordant fetal growth and fluid volume. We discussed that in its severe form, it can potentially result in  delivery or fetal morbidity or mortality due to poor placental function of the smaller twin and volume overload and cardiomyopathy of the larger twin. Treatment may consist of observation or laser photocoagulation of communicating vessels depending upon the circumstances and timing of the presentation. I reviewed with the patient the need for fetal ultrasound assessment for TTTS surveillance every two weeks starting at 16 weeks.     25- no abnormalities noted on ultrasound today.  Normal-appearing monochorionic diamniotic gestation with no signs of TTTS.  3/6/25- Twin A (vtx, maternal left), Twin B (transverse, head maternal left). No evidence of TTTS - each baby appropriately grown.  25- No evidence of TTTS/TAPS. Each baby appropriately grown. 6% discordance.    Recommendations:   Fetal ultrasound assessment every two weeks, starting at 16 weeks until delivery for TTTS surveillance (scheduled with MFM)  If high suspicion for evolving TTTS, increase ultrasound surveillance to once or twice weekly  Transvaginal cervical length screening at time of targeted anatomy ultrasound w/ MFM  Detailed  anatomy surveys at 18-20 weeks  Fetal growth ultrasounds every 3-4 weeks starting at 23-24 weeks (scheduled with MFM)  Fetal echocardiogram at 22-24 weeks (scheduled 3/11)  stopped due to nose bleedsFolic acid 1 mg daily and ferrous sulfate 325 mg daily  Increase daily dietary intake by approximately 300 kcal above that for a rodrigues pregnancy, or 600 kcal over that of a nonpregnant woman and monitor weight gain   testing with weekly NST and AFV assessment beginning at 32 weeks (can alternate with MFM every other week)  Given the increased risks of a twin pregnancy, prenatal visits every 2-3 weeks from 24 - 32/34 weeks and weekly prenatal visits thereafter    Delivery timing:   Normal growth, no complications: 37 0/7 - 37 6/7 weeks gestation   Normal growth, maternal comorbidities: 36 0/7 - 36 6/7 weeks gestation   FGR of one or both: 34 0/7 - 35 6/7 weeks gestation  History of TTTS, FGR with abnormal UA Doppler, oligohydramnios, or comorbid conditions: 32 0/7 - 34 6/7 weeks gestation    Comorbid conditions include: BMI >= 40, diabetes, hypertension, and complex maternal medical conditions associated with placental dysfunction (Lupus, renal disease, or other vascular disease).

## 2025-03-31 NOTE — ASSESSMENT & PLAN NOTE
We discussed the significance of an echogenic focus (EIF) in the ventricle of the fetal heart. This is not a true structural abnormality and is not associated with congenital heart disease or abnormal cardiac function. It is seen as a normal variant in about 5 - 7% of pregnancies. Down syndrome fetuses have a higher incidence of echogenic foci than normal fetuses, therefore it is considered to be a potential marker for fetal Down syndrome. In a woman with other risk factors for Down syndrome (advanced maternal age, elevated quad screen risk or presence of other markers), the presence of an echogenic focus may increase the relative risk of Down syndrome slightly. In a younger woman (< age 35) or in a woman without other risk factors or markers for Down syndrome, the significance of an isolated echogenic focus is minimal. The overwhelming majority (99%) of these fetuses will not have Down syndrome. The presence of an EIF is an indication for a targeted anatomic survey, which was done today. No fetal major structural malformations or other minor markers associated with Down Syndrome were identified. Serum screening, either with a quad screen or cfDNA testing, can be used to provide a quantitative estimate of the chance for fetal Down Syndrome. She has completed NIPT with no elevated risk of DS.     4/1/25- Twin B - EIF remains present. Anticipate spontaneous resolution in coming weeks. S/p fetal echo - normal.

## 2025-03-31 NOTE — PROGRESS NOTES
Maternal Fetal Medicine follow up consult    SUBJECTIVE:     Rocky Burch is a 34 y.o.  female with IUP at 28w1d who is seen in follow up consultation by M.  Pregnancy complications include:   Problem   - Twin B: EIF   - Monochorionic diamniotic twin gestation in third trimester   - Prior poor obstetrical history, antepartum   - Abnormal prenatal test     Rocky presents for routine follow up appointment.  She is starting to feel a little fatigued and uncomfortable now that she's in the third trimester with twins but overall doing well.   She had complaints of mild itching on her abdomen and breasts. Changed laundry detergents which helped. Cholestasis precautions provided.  Routine third trimester labs/glucola scheduled this week.  She's decreased work to mornings only which has significantly helped discomfort.   Understandably anxious as she approaches the 33w gestation cachorro as this was the time she had a previous pregnancy loss. Coping well.  Denies LOF, VB, contractions. Positive fetal movement x2.    Previous notes reviewed.   No changes to medical, surgical, family, social, or obstetric history.  Interval history since last M visit: see above  Medications reviewed.    Care team members:  Dr Gibson - Primary OB     OBJECTIVE:   /64 (BP Location: Right arm, Patient Position: Sitting)   Pulse 105   Ht 5' (1.524 m)   Wt 78 kg (171 lb 15.3 oz)   LMP 2024 (Exact Date)   BMI 33.58 kg/m²     Ultrasound performed. See viewpoint for full ultrasound report.    ASSESSMENT/PLAN:     34 y.o.  female with IUP at 28w1d    - Monochorionic diamniotic twin gestation in third trimester  We discussed monochorionic twinning and reviewed the types of placentation seen and their frequency. I counseled her on the increased incidence of preeclampsia/gestational hypertension, gestational diabetes, fetal growth restriction, anemia, congenital anomalies, need for antepartum hospitalization,   labor/PPROM, stillbirth, and risk of postpartum hemorrhage that can occur in twin pregnancies. It was explained that all monochorionic twins, have a single placenta and that this puts them at risk for twin transfusion syndrome (TTTS). We discussed twin-transfusion syndrome which is seen in ~ 10-15% of monochorionic twins due to unbalanced vascular communications within the placenta and can result in discordant fetal growth and fluid volume. We discussed that in its severe form, it can potentially result in  delivery or fetal morbidity or mortality due to poor placental function of the smaller twin and volume overload and cardiomyopathy of the larger twin. Treatment may consist of observation or laser photocoagulation of communicating vessels depending upon the circumstances and timing of the presentation. I reviewed with the patient the need for fetal ultrasound assessment for TTTS surveillance every two weeks starting at 16 weeks.     25- no abnormalities noted on ultrasound today.  Normal-appearing monochorionic diamniotic gestation with no signs of TTTS.  3/6/25- Twin A (vtx, maternal left), Twin B (transverse, head maternal left). No evidence of TTTS - each baby appropriately grown.  25- No evidence of TTTS/TAPS. Each baby appropriately grown. 6% discordance.    Recommendations:   Fetal ultrasound assessment every two weeks, starting at 16 weeks until delivery for TTTS surveillance (scheduled with MF)  If high suspicion for evolving TTTS, increase ultrasound surveillance to once or twice weekly  Transvaginal cervical length screening at time of targeted anatomy ultrasound w/ MFM  Detailed anatomy surveys at 18-20 weeks  Fetal growth ultrasounds every 3-4 weeks starting at 23-24 weeks (scheduled with MF)  Fetal echocardiogram at 22-24 weeks (scheduled 3/11)  stopped due to nose bleedsFolic acid 1 mg daily and ferrous sulfate 325 mg daily  Increase daily dietary intake by approximately 300 kcal  above that for a rodrigues pregnancy, or 600 kcal over that of a nonpregnant woman and monitor weight gain   testing with weekly NST and AFV assessment beginning at 32 weeks (can alternate with MFM every other week)  Given the increased risks of a twin pregnancy, prenatal visits every 2-3 weeks from 24 - 32/34 weeks and weekly prenatal visits thereafter    Delivery timing:   Normal growth, no complications: 37 0/7 - 37 6/7 weeks gestation   Normal growth, maternal comorbidities: 36 0/7 - 36 6/7 weeks gestation   FGR of one or both: 34 0/7 - 35 6/7 weeks gestation  History of TTTS, FGR with abnormal UA Doppler, oligohydramnios, or comorbid conditions: 32 0/7 - 34 6/7 weeks gestation    Comorbid conditions include: BMI >= 40, diabetes, hypertension, and complex maternal medical conditions associated with placental dysfunction (Lupus, renal disease, or other vascular disease).      - Prior poor obstetrical history, antepartum  I reviewed the patient's obstetric history which is remarkable for a previous stillbirth at 33 weeks gestation. Obstetric history remarkable for: Trisomy 18 with that pregnancy. Multiple anomalies noted on prenatal ultrasound consistent with diagnosis.    I counseled the patient regarding the risk for recurrent stillbirth. The recurrence risk depends on the underlying etiology. The cause of her previous pregnancy loss was Trisomy 18 as this diagnosis is incompatible with life. We have discussed the recurrence risk of chromosome abnormalities in subsequent pregnancies is estimated around 1% until age related risk exceeds this estimation.    Recommendations:  Genetic testing in all subsequent pregnancies (see separate documentation)      - Twin B: EIF  We discussed the significance of an echogenic focus (EIF) in the ventricle of the fetal heart. This is not a true structural abnormality and is not associated with congenital heart disease or abnormal cardiac function. It is seen as a normal  variant in about 5 - 7% of pregnancies. Down syndrome fetuses have a higher incidence of echogenic foci than normal fetuses, therefore it is considered to be a potential marker for fetal Down syndrome. In a woman with other risk factors for Down syndrome (advanced maternal age, elevated quad screen risk or presence of other markers), the presence of an echogenic focus may increase the relative risk of Down syndrome slightly. In a younger woman (< age 35) or in a woman without other risk factors or markers for Down syndrome, the significance of an isolated echogenic focus is minimal. The overwhelming majority (99%) of these fetuses will not have Down syndrome. The presence of an EIF is an indication for a targeted anatomic survey, which was done today. No fetal major structural malformations or other minor markers associated with Down Syndrome were identified. Serum screening, either with a quad screen or cfDNA testing, can be used to provide a quantitative estimate of the chance for fetal Down Syndrome. She has completed NIPT with no elevated risk of DS.     4/1/25- Twin B - EIF remains present. Anticipate spontaneous resolution in coming weeks. S/p fetal echo - normal.    - Abnormal prenatal test  Screening for common fetal aneuploidies is typically completed via cell-free DNA testing.  In all people, small pieces of genetic material (DNA) that are not contained within cells are present in the blood stream. During pregnancy, these small pieces of DNA are a mixture of the mother's DNA and DNA shed from the placenta. This test can indicate if there is an abnormal number of chromosomes 21, 18, 13, X, and Y. The approximate detection rate is 99% for Trisomy 21, 98% for Trisomy 18, 91% for Trisomy 13, and 90% for George Syndrome. The false positive rates are low ranging from 1/1000 to 2/1000.     This test may also incidentally determine other sources of aneuploidy which may not be fetal in origin. Inherent in this test  "is the chance to identify placental mosaicism which can occur about 1-2% of the time.     NIPT results via [facility] concluded fetal free DNA detected is monozygotic. However, no fetal fraction, gender, or risk of chromosome abnormalities reported. 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 This finding could also be due to normal variation and/or confined to placental tissue... Repeat cell-free DNA testing is not recommended..."    We have discussed the uncertainty of this test with multiple possibilities, one of which includes normal variation. We have discussed invasive testing options as well as risks/benefits/alternatives of each. It is possible that she or her partner are carrying a balanced translocation that increases their risk of aneuploidy. She and her  have not yet undergone parental karyotype to evaluate this. Genetic testing result from her baby "Suzanne" with trisomy 18 was not available today (she will try to locate it) but it may be helpful to understand if this is the case based on the type of test it was. Currently the NIPT findings suggest a possible abnormality but US is reassuring and could be confined to placenta.    At this time, she wishes to hold off on further testing. She will notify our clinic if she changes her mind or would like to proceed with diagnostic genetic testing. She is considering amniocentesis at 15-16 weeks.  She is interested in genetic counseling. We will refer to Madison Patino. Her case was discussed with genetics counselor today.    1/9/25- Had consult w/ genetics counselor (Mdaison). Politely declines any further testing at this time.       F/u in 2 weeks for TTTS surveillance  F/u in 4 weeks for MFM visit /growth ultrasound      "

## 2025-03-31 NOTE — ASSESSMENT & PLAN NOTE
"Screening for common fetal aneuploidies is typically completed via cell-free DNA testing.  In all people, small pieces of genetic material (DNA) that are not contained within cells are present in the blood stream. During pregnancy, these small pieces of DNA are a mixture of the mother's DNA and DNA shed from the placenta. This test can indicate if there is an abnormal number of chromosomes 21, 18, 13, X, and Y. The approximate detection rate is 99% for Trisomy 21, 98% for Trisomy 18, 91% for Trisomy 13, and 90% for George Syndrome. The false positive rates are low ranging from 1/1000 to 2/1000.     This test may also incidentally determine other sources of aneuploidy which may not be fetal in origin. Inherent in this test is the chance to identify placental mosaicism which can occur about 1-2% of the time.     NIPT results via [facility] concluded fetal free DNA detected is monozygotic. However, no fetal fraction, gender, or risk of chromosome abnormalities reported. 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 This finding could also be due to normal variation and/or confined to placental tissue... Repeat cell-free DNA testing is not recommended..."    We have discussed the uncertainty of this test with multiple possibilities, one of which includes normal variation. We have discussed invasive testing options as well as risks/benefits/alternatives of each. It is possible that she or her partner are carrying a balanced translocation that increases their risk of aneuploidy. She and her  have not yet undergone parental karyotype to evaluate this. Genetic testing result from her baby "Suzanne" with trisomy 18 was not available today (she will try to locate it) but it may be helpful to understand if this is the case based on the type of test it was. Currently the NIPT findings suggest a possible " abnormality but US is reassuring and could be confined to placenta.    At this time, she wishes to hold off on further testing. She will notify our clinic if she changes her mind or would like to proceed with diagnostic genetic testing. She is considering amniocentesis at 15-16 weeks.  She is interested in genetic counseling. We will refer to Madison Patino. Her case was discussed with genetics counselor today.    1/9/25- Had consult w/ genetics counselor (Madison). Politely declines any further testing at this time.

## 2025-04-01 ENCOUNTER — OFFICE VISIT (OUTPATIENT)
Dept: MATERNAL FETAL MEDICINE | Facility: CLINIC | Age: 35
End: 2025-04-01
Payer: COMMERCIAL

## 2025-04-01 ENCOUNTER — PROCEDURE VISIT (OUTPATIENT)
Dept: MATERNAL FETAL MEDICINE | Facility: CLINIC | Age: 35
End: 2025-04-01
Payer: COMMERCIAL

## 2025-04-01 VITALS
BODY MASS INDEX: 33.76 KG/M2 | WEIGHT: 171.94 LBS | HEIGHT: 60 IN | DIASTOLIC BLOOD PRESSURE: 64 MMHG | SYSTOLIC BLOOD PRESSURE: 112 MMHG | HEART RATE: 105 BPM

## 2025-04-01 DIAGNOSIS — O30.033 MONOCHORIONIC DIAMNIOTIC TWIN GESTATION IN THIRD TRIMESTER: Primary | ICD-10-CM

## 2025-04-01 DIAGNOSIS — O35.BXX2: ICD-10-CM

## 2025-04-01 DIAGNOSIS — O30.032 MONOCHORIONIC DIAMNIOTIC TWIN GESTATION IN SECOND TRIMESTER: ICD-10-CM

## 2025-04-01 DIAGNOSIS — O28.9 ABNORMAL PRENATAL TEST: ICD-10-CM

## 2025-04-01 DIAGNOSIS — O09.299 PRIOR POOR OBSTETRICAL HISTORY, ANTEPARTUM: ICD-10-CM

## 2025-04-01 PROCEDURE — 76820 UMBILICAL ARTERY ECHO: CPT | Mod: 59,S$GLB,, | Performed by: OBSTETRICS & GYNECOLOGY

## 2025-04-01 PROCEDURE — 76816 OB US FOLLOW-UP PER FETUS: CPT | Mod: 59,S$GLB,, | Performed by: OBSTETRICS & GYNECOLOGY

## 2025-04-01 PROCEDURE — 76821 MIDDLE CEREBRAL ARTERY ECHO: CPT | Mod: 59,S$GLB,, | Performed by: OBSTETRICS & GYNECOLOGY

## 2025-04-01 RX ORDER — FLUTICASONE PROPIONATE 50 MCG
1 SPRAY, SUSPENSION (ML) NASAL DAILY
COMMUNITY

## 2025-04-02 ENCOUNTER — INFUSION (OUTPATIENT)
Facility: HOSPITAL | Age: 35
End: 2025-04-02
Attending: OBSTETRICS & GYNECOLOGY
Payer: COMMERCIAL

## 2025-04-02 ENCOUNTER — LAB VISIT (OUTPATIENT)
Dept: LAB | Facility: HOSPITAL | Age: 35
End: 2025-04-02
Attending: OBSTETRICS & GYNECOLOGY
Payer: COMMERCIAL

## 2025-04-02 VITALS
DIASTOLIC BLOOD PRESSURE: 63 MMHG | HEART RATE: 93 BPM | RESPIRATION RATE: 18 BRPM | TEMPERATURE: 97 F | SYSTOLIC BLOOD PRESSURE: 113 MMHG

## 2025-04-02 DIAGNOSIS — O30.033 MONOCHORIONIC DIAMNIOTIC TWIN GESTATION IN THIRD TRIMESTER: Primary | ICD-10-CM

## 2025-04-02 DIAGNOSIS — O26.892 RH NEGATIVE STATUS DURING PREGNANCY IN SECOND TRIMESTER: Primary | ICD-10-CM

## 2025-04-02 DIAGNOSIS — Z67.91 RH NEGATIVE STATUS DURING PREGNANCY IN SECOND TRIMESTER: Primary | ICD-10-CM

## 2025-04-02 DIAGNOSIS — Z67.91 RH NEGATIVE STATUS DURING PREGNANCY IN SECOND TRIMESTER: ICD-10-CM

## 2025-04-02 DIAGNOSIS — O26.892 RH NEGATIVE STATUS DURING PREGNANCY IN SECOND TRIMESTER: ICD-10-CM

## 2025-04-02 DIAGNOSIS — O30.032 MONOCHORIONIC DIAMNIOTIC TWIN GESTATION IN SECOND TRIMESTER: ICD-10-CM

## 2025-04-02 LAB
ERYTHROCYTE [DISTWIDTH] IN BLOOD BY AUTOMATED COUNT: 14.1 % (ref 11–14.5)
GLUCOSE 1H P 100 G GLC PO SERPL-MCNC: 190 MG/DL (ref 100–180)
GROUP & RH: NORMAL
HCT VFR BLD AUTO: 32.9 % (ref 36–48)
HGB BLD-MCNC: 11.2 G/DL (ref 11.8–16)
INDIRECT COOMBS: NORMAL
MCH RBC QN AUTO: 29.8 PG (ref 27–34)
MCHC RBC AUTO-ENTMCNC: 34 G/DL (ref 31–37)
MCV RBC AUTO: 87.5 FL (ref 79–99)
NRBC BLD AUTO-RTO: 0 %
PLATELET # BLD AUTO: 163 X10(3)/MCL (ref 140–371)
PMV BLD AUTO: 10.8 FL (ref 9.4–12.4)
RBC # BLD AUTO: 3.76 X10(6)/MCL (ref 4–5.1)
RH IMMUNE GLOBULIN: NORMAL
SPECIMEN OUTDATE: NORMAL
T PALLIDUM AB SER QL: NONREACTIVE
WBC # BLD AUTO: 12.88 X10(3)/MCL (ref 4–11.5)

## 2025-04-02 PROCEDURE — 85027 COMPLETE CBC AUTOMATED: CPT

## 2025-04-02 PROCEDURE — 36415 COLL VENOUS BLD VENIPUNCTURE: CPT

## 2025-04-02 PROCEDURE — 96372 THER/PROPH/DIAG INJ SC/IM: CPT

## 2025-04-02 PROCEDURE — 63600519 RHOGAM PHARM REV CODE 636 ALT 250 W HCPCS: Performed by: OBSTETRICS & GYNECOLOGY

## 2025-04-02 PROCEDURE — 86780 TREPONEMA PALLIDUM: CPT

## 2025-04-02 PROCEDURE — 86901 BLOOD TYPING SEROLOGIC RH(D): CPT | Performed by: OBSTETRICS & GYNECOLOGY

## 2025-04-02 PROCEDURE — 82950 GLUCOSE TEST: CPT

## 2025-04-02 RX ADMIN — RHO(D) IMMUNE GLOBULIN (HUMAN) 300 MCG: 1500 SOLUTION INTRAMUSCULAR at 03:04

## 2025-04-03 ENCOUNTER — RESULTS FOLLOW-UP (OUTPATIENT)
Dept: OBSTETRICS AND GYNECOLOGY | Facility: CLINIC | Age: 35
End: 2025-04-03
Payer: COMMERCIAL

## 2025-04-03 DIAGNOSIS — O99.810 PREGNANCY-INDUCED GLUCOSE INTOLERANCE: Primary | ICD-10-CM

## 2025-04-09 ENCOUNTER — ROUTINE PRENATAL (OUTPATIENT)
Dept: OBSTETRICS AND GYNECOLOGY | Facility: CLINIC | Age: 35
End: 2025-04-09
Payer: COMMERCIAL

## 2025-04-09 VITALS
SYSTOLIC BLOOD PRESSURE: 126 MMHG | BODY MASS INDEX: 33.12 KG/M2 | WEIGHT: 169.63 LBS | DIASTOLIC BLOOD PRESSURE: 78 MMHG | HEART RATE: 114 BPM

## 2025-04-09 DIAGNOSIS — O99.343 DEPRESSION DURING PREGNANCY IN THIRD TRIMESTER: ICD-10-CM

## 2025-04-09 DIAGNOSIS — O26.893 RH NEGATIVE STATUS DURING PREGNANCY IN THIRD TRIMESTER: ICD-10-CM

## 2025-04-09 DIAGNOSIS — Z3A.29 29 WEEKS GESTATION OF PREGNANCY: ICD-10-CM

## 2025-04-09 DIAGNOSIS — F32.A DEPRESSION DURING PREGNANCY IN THIRD TRIMESTER: ICD-10-CM

## 2025-04-09 DIAGNOSIS — Z67.91 RH NEGATIVE STATUS DURING PREGNANCY IN THIRD TRIMESTER: ICD-10-CM

## 2025-04-09 DIAGNOSIS — O09.299 PRIOR POOR OBSTETRICAL HISTORY, ANTEPARTUM: ICD-10-CM

## 2025-04-09 DIAGNOSIS — O28.9 ABNORMAL PRENATAL TEST: ICD-10-CM

## 2025-04-09 DIAGNOSIS — O30.033 MONOCHORIONIC DIAMNIOTIC TWIN GESTATION IN THIRD TRIMESTER: Primary | ICD-10-CM

## 2025-04-09 LAB
BILIRUB UR QL STRIP: NEGATIVE
GLUCOSE 1H P 100 G GLC PO SERPL-MCNC: 182 MG/DL (ref 100–180)
GLUCOSE 2H P 100 G GLC PO SERPL-MCNC: 155 MG/DL
GLUCOSE 3H P 100 G GLC PO SERPL-MCNC: 83 MG/DL
GLUCOSE P FAST SERPL-MCNC: 81 MG/DL (ref 70–115)
GLUCOSE UR QL STRIP: NEGATIVE
KETONES UR QL STRIP: POSITIVE
LEUKOCYTE ESTERASE UR QL STRIP: NEGATIVE
PH, POC UA: 6
POC BLOOD, URINE: NEGATIVE
POC NITRATES, URINE: NEGATIVE
PROT UR QL STRIP: NEGATIVE
SP GR UR STRIP: 1.02 (ref 1–1.03)
UROBILINOGEN UR STRIP-ACNC: 0.2 (ref 0.1–1.1)

## 2025-04-09 PROCEDURE — 82950 GLUCOSE TEST: CPT | Performed by: OBSTETRICS & GYNECOLOGY

## 2025-04-09 PROCEDURE — 82952 GTT-ADDED SAMPLES: CPT | Performed by: OBSTETRICS & GYNECOLOGY

## 2025-04-09 PROCEDURE — 82951 GLUCOSE TOLERANCE TEST (GTT): CPT | Performed by: OBSTETRICS & GYNECOLOGY

## 2025-04-09 PROCEDURE — 82947 ASSAY GLUCOSE BLOOD QUANT: CPT | Performed by: OBSTETRICS & GYNECOLOGY

## 2025-04-09 NOTE — PROGRESS NOTES
HPI  Rocky Burch is a 34 y.o.   29w2d here for Routine Prenatal Visit (PATIENT PRESENTS TO CLINIC FOR ROUTINE OB VISIT WITH 3HR GTT. NO COMPLAINTS. WILL TAKE T-DAP ON NEXT VISIT.)    Denies any VB, ROM, and PIH sxs. Reports active fetal movements.     Rocky's allergies were reviewed and updated as appropriate.    Past Medical History:   Diagnosis Date    Anxiety disorder, unspecified     Constipation     Fetal demise, greater than 22 weeks, antepartum, fetus 1 2023    Maternal care for (suspected) chromosomal abnormality in fetus, trisomy 18, not applicable or unspecified 2023    Molar pregnancy     Rh negative state in antepartum period, third trimester 2023    Stillborn, abnormal      Family History   Problem Relation Name Age of Onset    Depression Father      Hypertension Father      Gestational diabetes Mother       Social History[1]  OB History    Para Term  AB Living   3 1  1 1    SAB IAB Ectopic Multiple Live Births   1 0  0       # Outcome Date GA Lbr José Miguel/2nd Weight Sex Type Anes PTL Lv   3 Current            2 SAB 23     SAB         Birth Comments: molar pregnancy   1  23 33w2d 02:25 / 00:18 0.959 kg (2 lb 1.8 oz) F Vag-Spont None  FD      Complications: Trisomy 13, unspecified     Current Medications[2]    ROS:  A comprehensive review of symptoms was completed and negative except as noted above.    Physical Exam:    Chaperone present for exam.    /78   Pulse (!) 114   Wt 76.9 kg (169 lb 9.6 oz)   LMP 2024 (Exact Date)   BMI 33.12 kg/m²     Gen: No distress  Abdomen: Gravid, non-tender  Pelvic: DEFERRED  Extremities: No edema    Fetal Heart Rate: BABY A 147, BABY B 137  Movement: Present    Recent Results (from the past 24 hours)   POCT Urinalysis, Dipstick, Automated, W/O Scope    Collection Time: 25  9:30 AM   Result Value Ref Range    POC Blood, Urine Negative Negative    POC Bilirubin, Urine Negative Negative    POC  Urobilinogen, Urine 0.2 0.1 - 1.1    POC Ketones, Urine Positive (A) Negative    POC Protein, Urine Negative Negative    POC Nitrates, Urine Negative Negative    POC Glucose, Urine Negative Negative    pH, UA 6.0     POC Specific Gravity, Urine 1.025 1.003 - 1.029    POC Leukocytes, Urine Negative Negative       Last PAP Date: 11/25/2024    ASSESSMENT/PLAN:    1. Monochorionic diamniotic twin gestation in third trimester  -     POCT Urinalysis, Dipstick, Automated, W/O Scope  -     Glucose Tolerance, 3 Hours OB; Future; Expected date: 04/09/2025  MFM FOLLOW UP NEXT WEEK.    2. 29 weeks gestation of pregnancy  -     Glucose Tolerance, 3 Hours OB; Future; Expected date: 04/09/2025    3. Rh negative status during pregnancy in third trimester  Comments:  RHOGAM GIVEN 4/2  Orders:  -     Glucose Tolerance, 3 Hours OB; Future; Expected date: 04/09/2025    4. Depression during pregnancy in third trimester  -     Glucose Tolerance, 3 Hours OB; Future; Expected date: 04/09/2025    5. - Prior poor obstetrical history, antepartum  -     Glucose Tolerance, 3 Hours OB; Future; Expected date: 04/09/2025    6. - Abnormal prenatal test  -     Glucose Tolerance, 3 Hours OB; Future; Expected date: 04/09/2025      Labor unit and pre-eclampsia precautions given.  Fetal kick count instructions given to patient.     Follow Up:  Follow up in about 2 weeks (around 4/23/2025) for OB VISIT.            [1]   Social History  Tobacco Use    Smoking status: Never     Passive exposure: Never    Smokeless tobacco: Never   Substance Use Topics    Alcohol use: Not Currently     Comment: social    Drug use: Never   [2]   Current Outpatient Medications:     busPIRone (BUSPAR) 5 MG Tab, Take 5 mg by mouth 2 (two) times daily as needed., Disp: , Rfl:     calcium carbonate (OS-DEMETRI) 500 mg calcium (1,250 mg) tablet, Take 2 tablets (1,000 mg total) by mouth 2 (two) times daily., Disp: 60 tablet, Rfl: 5    docusate sodium (COLACE) 100 MG capsule, Take 100  mg by mouth 2 (two) times daily., Disp: , Rfl:     ferrous sulfate 324 mg (65 mg iron) TbEC, Take 325 mg by mouth once daily., Disp: , Rfl:     fluticasone propionate (FLONASE) 50 mcg/actuation nasal spray, 1 spray by Each Nostril route once daily., Disp: , Rfl:     prenatal vit no.124/iron/folic (PRENATAL VITAMIN ORAL), Take 1 tablet by mouth once daily., Disp: , Rfl:     ZINC ORAL, Take by mouth., Disp: , Rfl:

## 2025-04-10 ENCOUNTER — RESULTS FOLLOW-UP (OUTPATIENT)
Dept: OBSTETRICS AND GYNECOLOGY | Facility: HOSPITAL | Age: 35
End: 2025-04-10
Payer: COMMERCIAL

## 2025-04-14 ENCOUNTER — PATIENT MESSAGE (OUTPATIENT)
Dept: OTHER | Facility: OTHER | Age: 35
End: 2025-04-14
Payer: COMMERCIAL

## 2025-04-14 ENCOUNTER — ROUTINE PRENATAL (OUTPATIENT)
Dept: OBSTETRICS AND GYNECOLOGY | Facility: CLINIC | Age: 35
End: 2025-04-14
Payer: COMMERCIAL

## 2025-04-14 VITALS
HEART RATE: 92 BPM | SYSTOLIC BLOOD PRESSURE: 120 MMHG | BODY MASS INDEX: 33.85 KG/M2 | DIASTOLIC BLOOD PRESSURE: 72 MMHG | WEIGHT: 173.31 LBS

## 2025-04-14 DIAGNOSIS — O26.893 RH NEGATIVE STATUS DURING PREGNANCY IN THIRD TRIMESTER: ICD-10-CM

## 2025-04-14 DIAGNOSIS — O99.343 DEPRESSION DURING PREGNANCY IN THIRD TRIMESTER: ICD-10-CM

## 2025-04-14 DIAGNOSIS — O28.9 ABNORMAL PRENATAL TEST: ICD-10-CM

## 2025-04-14 DIAGNOSIS — Z3A.30 30 WEEKS GESTATION OF PREGNANCY: ICD-10-CM

## 2025-04-14 DIAGNOSIS — O30.033 MONOCHORIONIC DIAMNIOTIC TWIN GESTATION IN THIRD TRIMESTER: Primary | ICD-10-CM

## 2025-04-14 DIAGNOSIS — Z67.91 RH NEGATIVE STATUS DURING PREGNANCY IN THIRD TRIMESTER: ICD-10-CM

## 2025-04-14 DIAGNOSIS — F32.A DEPRESSION DURING PREGNANCY IN THIRD TRIMESTER: ICD-10-CM

## 2025-04-14 DIAGNOSIS — O09.299 PRIOR POOR OBSTETRICAL HISTORY, ANTEPARTUM: ICD-10-CM

## 2025-04-14 LAB
BILIRUB UR QL STRIP: NEGATIVE
GLUCOSE UR QL STRIP: NEGATIVE
KETONES UR QL STRIP: POSITIVE
LEUKOCYTE ESTERASE UR QL STRIP: NEGATIVE
PH, POC UA: 6
POC BLOOD, URINE: NEGATIVE
POC NITRATES, URINE: NEGATIVE
PROT UR QL STRIP: POSITIVE
SP GR UR STRIP: 1.03 (ref 1–1.03)
UROBILINOGEN UR STRIP-ACNC: 0.2 (ref 0.1–1.1)

## 2025-04-14 NOTE — PROGRESS NOTES
HPI  Rocky Burch is a 34 y.o.   30w0d here for Routine Prenatal Visit (PATIENT PRESENTS TO CLINIC TODAY FOR GESTATIONAL DIABETIES TEACHING. NO COMPLAINTS)    Denies any VB, ROM, and PIH sxs. Reports active fetal movements.     Rocky's allergies were reviewed and updated as appropriate.    Past Medical History:   Diagnosis Date    Anxiety disorder, unspecified     Constipation     Fetal demise, greater than 22 weeks, antepartum, fetus 1 2023    Maternal care for (suspected) chromosomal abnormality in fetus, trisomy 18, not applicable or unspecified 2023    Molar pregnancy     Rh negative state in antepartum period, third trimester 2023    Stillborn, abnormal      Family History   Problem Relation Name Age of Onset    Depression Father      Hypertension Father      Gestational diabetes Mother       Social History[1]  OB History    Para Term  AB Living   3 1  1 1    SAB IAB Ectopic Multiple Live Births   1 0  0       # Outcome Date GA Lbr José Miguel/2nd Weight Sex Type Anes PTL Lv   3 Current            2 SAB 23     SAB         Birth Comments: molar pregnancy   1  23 33w2d 02:25 / 00:18 0.959 kg (2 lb 1.8 oz) F Vag-Spont None  FD      Complications: Trisomy 13, unspecified     Current Medications[2]    ROS:  A comprehensive review of symptoms was completed and negative except as noted above.    Physical Exam:    Chaperone present for exam.    /72   Pulse 92   Wt 78.6 kg (173 lb 4.8 oz)   LMP 2024 (Exact Date)   BMI 33.85 kg/m²     Gen: No distress  Abdomen: Gravid, non-tender  Pelvic:   Extremities: No edema       No results found for this or any previous visit (from the past 24 hours).  ABO/Rh:   Lab Results   Component Value Date/Time    GROUPTRH O NEG 2025 01:23 PM    GROUPTRH O NEG 2022 12:00 AM    ABORH O NEG 2023 12:50 PM    ABSCREEN NEG 2023 12:50 PM    ABSCREEN Negative 2022 12:00 AM       H&H:  Lab Results  "  Component Value Date/Time    HGB 11.2 (L) 04/02/2025 01:23 PM    HGB 12.1 11/23/2022 12:00 AM    HCT 32.9 (L) 04/02/2025 01:23 PM    HCT 35 (A) 11/23/2022 12:00 AM       Platelet:  Lab Results   Component Value Date/Time     04/02/2025 01:23 PM     11/23/2022 12:00 AM       [place]:   Lab Results   Component Value Date/Time    BENB0LI 182 (H) 04/09/2025 09:19 AM    RLDB9ME 155 04/09/2025 10:14 AM    RQVB7IQ 83 04/09/2025 11:30 AM       HIV:   Lab Results   Component Value Date/Time    HIV Nonreactive 11/27/2024 09:24 AM    [id number] Nonreactive 09/21/2022 12:00 AM       RPR:  Lab Results   Component Value Date/Time    RPR nonreactive 09/21/2022 12:00 AM    SYPHAB Nonreactive 04/02/2025 01:23 PM       Hepatitis B Surface Antigen:  Lab Results   Component Value Date/Time    HEPBSAG Negative 11/27/2024 09:24 AM    HEPBSAG Negative 09/21/2022 12:00 AM       Hepatitis C:  Lab Results   Component Value Date/Time    HEPCAB Nonreactive 11/27/2024 09:24 AM    HEPCAB Negative 09/21/2022 12:00 AM       Rubella Immune Status:  Lab Results   Component Value Date/Time    RUBELLAIMMUN Immune 09/21/2022 12:00 AM    RUBELLAIGG >150.00 11/27/2024 09:24 AM       GBS:No results found for: "SREPBPCR", "STREPBCULT", "STREPONLY"     Last PAP Date: 11/25/2024    ASSESSMENT/PLAN:    1. Monochorionic diamniotic twin gestation in third trimester  -     POCT Urinalysis, Dipstick, Automated, W/O Scope    2. 30 weeks gestation of pregnancy    3. Rh negative status during pregnancy in third trimester    4. Depression during pregnancy in third trimester    5. - Prior poor obstetrical history, antepartum    6. - Abnormal prenatal test        Labor unit and pre-eclampsia precautions given.  Fetal kick count instructions given to patient.     Follow Up:  Follow up in about 2 weeks (around 4/28/2025) for OB VISIT.            [1]   Social History  Tobacco Use    Smoking status: Never     Passive exposure: Never    Smokeless tobacco: " Never   Substance Use Topics    Alcohol use: Not Currently     Comment: social    Drug use: Never   [2]   Current Outpatient Medications:     busPIRone (BUSPAR) 5 MG Tab, Take 5 mg by mouth 2 (two) times daily as needed., Disp: , Rfl:     calcium carbonate (OS-DEMETRI) 500 mg calcium (1,250 mg) tablet, Take 2 tablets (1,000 mg total) by mouth 2 (two) times daily., Disp: 60 tablet, Rfl: 5    docusate sodium (COLACE) 100 MG capsule, Take 100 mg by mouth 2 (two) times daily., Disp: , Rfl:     ferrous sulfate 324 mg (65 mg iron) TbEC, Take 325 mg by mouth once daily., Disp: , Rfl:     fluticasone propionate (FLONASE) 50 mcg/actuation nasal spray, 1 spray by Each Nostril route once daily., Disp: , Rfl:     prenatal vit no.124/iron/folic (PRENATAL VITAMIN ORAL), Take 1 tablet by mouth once daily., Disp: , Rfl:     ZINC ORAL, Take by mouth., Disp: , Rfl:

## 2025-04-16 ENCOUNTER — PROCEDURE VISIT (OUTPATIENT)
Dept: MATERNAL FETAL MEDICINE | Facility: CLINIC | Age: 35
End: 2025-04-16
Payer: COMMERCIAL

## 2025-04-16 ENCOUNTER — OFFICE VISIT (OUTPATIENT)
Dept: MATERNAL FETAL MEDICINE | Facility: CLINIC | Age: 35
End: 2025-04-16
Payer: COMMERCIAL

## 2025-04-16 VITALS — SYSTOLIC BLOOD PRESSURE: 114 MMHG | DIASTOLIC BLOOD PRESSURE: 72 MMHG | HEART RATE: 94 BPM

## 2025-04-16 DIAGNOSIS — O24.410 DIET CONTROLLED GESTATIONAL DIABETES MELLITUS (GDM) IN THIRD TRIMESTER: ICD-10-CM

## 2025-04-16 DIAGNOSIS — O30.032 MONOCHORIONIC DIAMNIOTIC TWIN GESTATION IN SECOND TRIMESTER: ICD-10-CM

## 2025-04-16 DIAGNOSIS — O30.033 MONOCHORIONIC DIAMNIOTIC TWIN GESTATION IN THIRD TRIMESTER: Primary | ICD-10-CM

## 2025-04-16 PROCEDURE — 76820 UMBILICAL ARTERY ECHO: CPT | Mod: 59,S$GLB,, | Performed by: OBSTETRICS & GYNECOLOGY

## 2025-04-16 PROCEDURE — 76816 OB US FOLLOW-UP PER FETUS: CPT | Mod: 59,S$GLB,, | Performed by: OBSTETRICS & GYNECOLOGY

## 2025-04-16 PROCEDURE — 76821 MIDDLE CEREBRAL ARTERY ECHO: CPT | Mod: 59,S$GLB,, | Performed by: OBSTETRICS & GYNECOLOGY

## 2025-04-16 NOTE — ASSESSMENT & PLAN NOTE
I counseled the patient regarding the risks of gestational diabetes, which include macrosomia, hypertensive disorders of pregnancy,  hypoglycemia, shoulder dystocia/birth trauma, polyhydramnios, and increased risk of  delivery.  Patients requiring medical therapy have an increased risk of stillbirth.  Discussed that  outcome is linked to her ability to achieve glycemic control.  The goal of treatment is to have a fasting blood sugar between 70 and 95 mg/dL and 2 hour postprandials less than 120 mg/dL.  Therapy will likely consist of therapy with metformin or insulin. Approximately 30-50% of the time, women who are well-controlled on metformin may require the addition of insulin as the pregnancy progresses. Glyburide therapy has demonstrated inferior results as compared to metformin and insulin, and therefore, is not a first-line choice. Antepartum testing is indicated for those patients requiring medical therapy to reduce the incidence of fetal demise. We discussed dietary counseling and appropriate exercise to improve peripheral insulin resistance. We discussed the frequency of blood sugar monitoring and goal blood sugars. She has not met with a diabetic educator this pregnancy. I would recommend obtaining serial growth ultrasounds in the third trimester to monitor for macrosomia.    Most women with GDM are cured by delivery. All medications can be stopped postpartum. However, some women with GDM have undiagnosed type 2 diabetes. Therefore, I recommend obtaining a postpartum fasting blood sugar prior to discharge. Approximately 20% of women with GDM will have glucose intolerance or type 2 DM at the postpartum visit. A 2 hour glucose tolerance test should be performed 6-8 weeks postpartum. If the patient is breastfeeding, it is reasonable to delay this as appropriate. Additionally, women with GDM are at increased risk of developing type 2 DM later in life. Therefore, establishing with a  primary MD who can perform annual diabetes screening is appropriate.     She was recently diagnosed with gDM earlier this week. She was provided information for a diabetic diet and has been strictly following. She is checking her sugars four times daily and presents with a log for review - excellent glycemic control.    Recommendations:  MFM will review blood sugars in 1 week via portal; We reinforced checking 4 x/day and reinforced goal blood sugars  Regimen: Diet  Growth scans and antepartum testing discussed in twin plan  Check fasting blood sugar in hospital postpartum.   If normal, discontinue therapy and monitoring and recommend repeat postpartum glucose testing in 6-8 weeks postpartum using a 75 g oral glucose tolerance test.   If fasting blood glucose is between 100-125 mg/dl or impaired glucose tolerance is noted on 2 hour glucose test, refer to primary care as appropriate.   An ultrasound for estimated fetal weight should be obtained within 3 weeks of anticipated delivery; if the EFW is >= 4500 grams, a  should be offered.

## 2025-04-16 NOTE — PROGRESS NOTES
Maternal Fetal Medicine follow up consult    SUBJECTIVE:     Rocky Burch is a 34 y.o.  female with IUP at 30w2d who is seen in follow up consultation by MFM.  Pregnancy complications include:   Problem   - Diet controlled gestational diabetes mellitus (GDM) in third trimester   - Monochorionic diamniotic twin gestation in third trimester     Rocky presents for routine TTTS surveillance.  She was recently diagnosed with gDM and is currently following a diabetic diet. She presents with a log for review.  Denies LOF, VB, contractions. Positive fetal movement.    Previous notes reviewed.   No changes to medical, surgical, family, social, or obstetric history.  Interval history since last MFM visit: see above  Medications reviewed.    Care team members:  Dr Gibson - Primary OB     OBJECTIVE:   LMP 2024 (Exact Date)   /72, pulse 94    Ultrasound performed. See viewpoint for full ultrasound report.    ASSESSMENT/PLAN:     34 y.o.  female with IUP at 30w2d    - Monochorionic diamniotic twin gestation in third trimester  We discussed monochorionic twinning and reviewed the types of placentation seen and their frequency. I counseled her on the increased incidence of preeclampsia/gestational hypertension, gestational diabetes, fetal growth restriction, anemia, congenital anomalies, need for antepartum hospitalization,  labor/PPROM, stillbirth, and risk of postpartum hemorrhage that can occur in twin pregnancies. It was explained that all monochorionic twins, have a single placenta and that this puts them at risk for twin transfusion syndrome (TTTS). We discussed twin-transfusion syndrome which is seen in ~ 10-15% of monochorionic twins due to unbalanced vascular communications within the placenta and can result in discordant fetal growth and fluid volume. We discussed that in its severe form, it can potentially result in  delivery or fetal morbidity or mortality due to poor placental  function of the smaller twin and volume overload and cardiomyopathy of the larger twin. Treatment may consist of observation or laser photocoagulation of communicating vessels depending upon the circumstances and timing of the presentation. I reviewed with the patient the need for fetal ultrasound assessment for TTTS surveillance every two weeks starting at 16 weeks.     25- no abnormalities noted on ultrasound today.  Normal-appearing monochorionic diamniotic gestation with no signs of TTTS.  3/6/25- Twin A (vtx, maternal left), Twin B (transverse, head maternal left). No evidence of TTTS - each baby appropriately grown.  25- No evidence of TTTS/TAPS. Each baby appropriately grown. 6% discordance.    Recommendations:   Fetal ultrasound assessment every two weeks, starting at 16 weeks until delivery for TTTS surveillance (scheduled with MFM)  If high suspicion for evolving TTTS, increase ultrasound surveillance to once or twice weekly  Transvaginal cervical length screening at time of targeted anatomy ultrasound w/ MFM  Detailed anatomy surveys at 18-20 weeks  Fetal growth ultrasounds every 3-4 weeks starting at 23-24 weeks (scheduled with MFM)  Fetal echocardiogram at 22-24 weeks (scheduled 3/11)  stopped due to nose bleedsFolic acid 1 mg daily and ferrous sulfate 325 mg daily  Increase daily dietary intake by approximately 300 kcal above that for a rodrigues pregnancy, or 600 kcal over that of a nonpregnant woman and monitor weight gain   testing with weekly NST and AFV assessment beginning at 32 weeks (can alternate with MFM every other week)  Given the increased risks of a twin pregnancy, prenatal visits every 2-3 weeks from 24 - 32/34 weeks and weekly prenatal visits thereafter    Delivery timing:   Normal growth, no complications: 37 0/7 - 37 6/7 weeks gestation   Normal growth, maternal comorbidities: 36 0/7 - 36 6/7 weeks gestation   FGR of one or both: 34 0/7 - 35 6/7 weeks  gestation  History of TTTS, FGR with abnormal UA Doppler, oligohydramnios, or comorbid conditions: 32 0/7 - 34 6/7 weeks gestation    Comorbid conditions include: BMI >= 40, diabetes, hypertension, and complex maternal medical conditions associated with placental dysfunction (Lupus, renal disease, or other vascular disease).      - Diet controlled gestational diabetes mellitus (GDM) in third trimester  I counseled the patient regarding the risks of gestational diabetes, which include macrosomia, hypertensive disorders of pregnancy,  hypoglycemia, shoulder dystocia/birth trauma, polyhydramnios, and increased risk of  delivery.  Patients requiring medical therapy have an increased risk of stillbirth.  Discussed that  outcome is linked to her ability to achieve glycemic control.  The goal of treatment is to have a fasting blood sugar between 70 and 95 mg/dL and 2 hour postprandials less than 120 mg/dL.  Therapy will likely consist of therapy with metformin or insulin. Approximately 30-50% of the time, women who are well-controlled on metformin may require the addition of insulin as the pregnancy progresses. Glyburide therapy has demonstrated inferior results as compared to metformin and insulin, and therefore, is not a first-line choice. Antepartum testing is indicated for those patients requiring medical therapy to reduce the incidence of fetal demise. We discussed dietary counseling and appropriate exercise to improve peripheral insulin resistance. We discussed the frequency of blood sugar monitoring and goal blood sugars. She has not met with a diabetic educator this pregnancy. I would recommend obtaining serial growth ultrasounds in the third trimester to monitor for macrosomia.    Most women with GDM are cured by delivery. All medications can be stopped postpartum. However, some women with GDM have undiagnosed type 2 diabetes. Therefore, I recommend obtaining a postpartum fasting blood  sugar prior to discharge. Approximately 20% of women with GDM will have glucose intolerance or type 2 DM at the postpartum visit. A 2 hour glucose tolerance test should be performed 6-8 weeks postpartum. If the patient is breastfeeding, it is reasonable to delay this as appropriate. Additionally, women with GDM are at increased risk of developing type 2 DM later in life. Therefore, establishing with a primary MD who can perform annual diabetes screening is appropriate.     She was recently diagnosed with gDM earlier this week. She was provided information for a diabetic diet and has been strictly following. She is checking her sugars four times daily and presents with a log for review - excellent glycemic control.    Recommendations:  MFM will review blood sugars in 1 week via portal; We reinforced checking 4 x/day and reinforced goal blood sugars  Regimen: Diet  Growth scans and antepartum testing discussed in twin plan  Check fasting blood sugar in hospital postpartum.   If normal, discontinue therapy and monitoring and recommend repeat postpartum glucose testing in 6-8 weeks postpartum using a 75 g oral glucose tolerance test.   If fasting blood glucose is between 100-125 mg/dl or impaired glucose tolerance is noted on 2 hour glucose test, refer to primary care as appropriate.   An ultrasound for estimated fetal weight should be obtained within 3 weeks of anticipated delivery; if the EFW is >= 4500 grams, a  should be offered.        F/u in 2 weeks for MFM visit /growth ultrasound

## 2025-04-17 DIAGNOSIS — O99.619 GASTROESOPHAGEAL REFLUX IN PREGNANCY: Primary | ICD-10-CM

## 2025-04-17 DIAGNOSIS — K21.9 GASTROESOPHAGEAL REFLUX IN PREGNANCY: Primary | ICD-10-CM

## 2025-04-17 RX ORDER — OMEPRAZOLE 40 MG/1
40 CAPSULE, DELAYED RELEASE ORAL DAILY
Qty: 30 CAPSULE | Refills: 3 | Status: SHIPPED | OUTPATIENT
Start: 2025-04-17

## 2025-04-23 ENCOUNTER — ROUTINE PRENATAL (OUTPATIENT)
Dept: OBSTETRICS AND GYNECOLOGY | Facility: CLINIC | Age: 35
End: 2025-04-23
Payer: COMMERCIAL

## 2025-04-23 VITALS
WEIGHT: 176.63 LBS | SYSTOLIC BLOOD PRESSURE: 112 MMHG | BODY MASS INDEX: 34.49 KG/M2 | HEART RATE: 84 BPM | DIASTOLIC BLOOD PRESSURE: 68 MMHG

## 2025-04-23 DIAGNOSIS — O99.343 ANXIETY DURING PREGNANCY IN THIRD TRIMESTER, ANTEPARTUM: ICD-10-CM

## 2025-04-23 DIAGNOSIS — Z67.91 RH NEGATIVE STATUS DURING PREGNANCY IN THIRD TRIMESTER: ICD-10-CM

## 2025-04-23 DIAGNOSIS — Z3A.31 31 WEEKS GESTATION OF PREGNANCY: ICD-10-CM

## 2025-04-23 DIAGNOSIS — F41.9 ANXIETY DURING PREGNANCY IN THIRD TRIMESTER, ANTEPARTUM: ICD-10-CM

## 2025-04-23 DIAGNOSIS — O30.033 MONOCHORIONIC DIAMNIOTIC TWIN GESTATION IN THIRD TRIMESTER: Primary | ICD-10-CM

## 2025-04-23 DIAGNOSIS — O26.893 RH NEGATIVE STATUS DURING PREGNANCY IN THIRD TRIMESTER: ICD-10-CM

## 2025-04-23 DIAGNOSIS — O09.299 PRIOR POOR OBSTETRICAL HISTORY, ANTEPARTUM: ICD-10-CM

## 2025-04-23 DIAGNOSIS — O26.892 RH NEGATIVE STATUS DURING PREGNANCY IN SECOND TRIMESTER: ICD-10-CM

## 2025-04-23 DIAGNOSIS — O24.410 DIET CONTROLLED GESTATIONAL DIABETES MELLITUS (GDM) IN THIRD TRIMESTER: ICD-10-CM

## 2025-04-23 DIAGNOSIS — O28.9 ABNORMAL PRENATAL TEST: ICD-10-CM

## 2025-04-23 DIAGNOSIS — Z67.91 RH NEGATIVE STATUS DURING PREGNANCY IN SECOND TRIMESTER: ICD-10-CM

## 2025-04-23 LAB
BILIRUB UR QL STRIP: NEGATIVE
GLUCOSE UR QL STRIP: NEGATIVE
KETONES UR QL STRIP: POSITIVE
LEUKOCYTE ESTERASE UR QL STRIP: NEGATIVE
PH, POC UA: 7
POC BLOOD, URINE: NEGATIVE
POC NITRATES, URINE: NEGATIVE
PROT UR QL STRIP: NEGATIVE
SP GR UR STRIP: 1.01 (ref 1–1.03)
UROBILINOGEN UR STRIP-ACNC: 0.2 (ref 0.1–1.1)

## 2025-04-23 RX ORDER — LANCETS 28 GAUGE
EACH MISCELLANEOUS
COMMUNITY
Start: 2025-04-14

## 2025-04-23 RX ORDER — DIPHENHYDRAMINE HCL 25 MG
TABLET ORAL
COMMUNITY
Start: 2025-04-14

## 2025-04-23 RX ORDER — CALCIUM CITRATE/VITAMIN D3 200MG-6.25
TABLET ORAL
COMMUNITY
Start: 2025-04-14

## 2025-04-23 NOTE — PROGRESS NOTES
HPI  Rocky Burch is a 34 y.o.   31w2d here for Routine Prenatal Visit (Denies complaints. Would like tdap next visit.)  Denies any VB, ROM, and PIH sxs. Reports active fetal movements.   TWIN B HAS BEEN PERSISTENT BREECH, WOULD LIKE TO PLAN FOR PRIMARY C/S.    Rocky's allergies were reviewed and updated as appropriate.    Past Medical History:   Diagnosis Date    Anxiety disorder, unspecified     Constipation     Fetal demise, greater than 22 weeks, antepartum, fetus 1 2023    Maternal care for (suspected) chromosomal abnormality in fetus, trisomy 18, not applicable or unspecified 2023    Molar pregnancy     Rh negative state in antepartum period, third trimester 2023    Stillborn, abnormal      Family History   Problem Relation Name Age of Onset    Depression Father      Hypertension Father      Gestational diabetes Mother       Social History[1]  OB History    Para Term  AB Living   3 1  1 1    SAB IAB Ectopic Multiple Live Births   1 0  0       # Outcome Date GA Lbr José Miguel/2nd Weight Sex Type Anes PTL Lv   3 Current            2 SAB 23     SAB         Birth Comments: molar pregnancy   1  23 33w2d 02:25 / 00:18 0.959 kg (2 lb 1.8 oz) F Vag-Spont None  FD      Complications: Trisomy 13, unspecified     Current Medications[2]    ROS:  A comprehensive review of symptoms was completed and negative except as noted above.    Physical Exam:    Chaperone present for exam.    /68   Pulse 84   Wt 80.1 kg (176 lb 9.6 oz)   LMP 2024 (Exact Date)   BMI 34.49 kg/m²     Gen: No distress  Abdomen: Gravid, non-tender  Pelvic: DEFERRED  Extremities: No edema    Fetal Heart Rate: 142 (baby B)  Movement: Present    Recent Results (from the past 24 hours)   POCT Urinalysis, Dipstick, Automated, W/O Scope    Collection Time: 25  1:07 PM   Result Value Ref Range    POC Blood, Urine Negative Negative    POC Bilirubin, Urine Negative Negative    POC Urobilinogen,  Urine 0.2 0.1 - 1.1    POC Ketones, Urine Positive (A) Negative    POC Protein, Urine Negative Negative    POC Nitrates, Urine Negative Negative    POC Glucose, Urine Negative Negative    pH, UA 7.0     POC Specific Gravity, Urine 1.015 1.003 - 1.029    POC Leukocytes, Urine Negative Negative     ABO/Rh:   Lab Results   Component Value Date/Time    GROUPTRH O NEG 04/02/2025 01:23 PM    GROUPTRH O NEG 09/21/2022 12:00 AM    ABORH O NEG 09/19/2023 12:50 PM    ABSCREEN NEG 09/19/2023 12:50 PM    ABSCREEN Negative 08/22/2022 12:00 AM       H&H:  Lab Results   Component Value Date/Time    HGB 11.2 (L) 04/02/2025 01:23 PM    HGB 12.1 11/23/2022 12:00 AM    HCT 32.9 (L) 04/02/2025 01:23 PM    HCT 35 (A) 11/23/2022 12:00 AM       Platelet:  Lab Results   Component Value Date/Time     04/02/2025 01:23 PM     11/23/2022 12:00 AM       [place]:   Lab Results   Component Value Date/Time    PTIK5FV 182 (H) 04/09/2025 09:19 AM    IKKC5CL 155 04/09/2025 10:14 AM    TFRY2KJ 83 04/09/2025 11:30 AM       HIV:   Lab Results   Component Value Date/Time    HIV Nonreactive 11/27/2024 09:24 AM    [id number] Nonreactive 09/21/2022 12:00 AM       RPR:  Lab Results   Component Value Date/Time    RPR nonreactive 09/21/2022 12:00 AM    SYPHAB Nonreactive 04/02/2025 01:23 PM       Hepatitis B Surface Antigen:  Lab Results   Component Value Date/Time    HEPBSAG Negative 11/27/2024 09:24 AM    HEPBSAG Negative 09/21/2022 12:00 AM       Hepatitis C:  Lab Results   Component Value Date/Time    HEPCAB Nonreactive 11/27/2024 09:24 AM    HEPCAB Negative 09/21/2022 12:00 AM       Rubella Immune Status:  Lab Results   Component Value Date/Time    RUBELLAIMMUN Immune 09/21/2022 12:00 AM    RUBELLAIGG >150.00 11/27/2024 09:24 AM     Last PAP Date: 11/25/2024    ASSESSMENT/PLAN:    1. Monochorionic diamniotic twin gestation in third trimester  -     POCT Urinalysis, Dipstick, Automated, W/O Scope    2. 31 weeks gestation of pregnancy    3.  Diet controlled gestational diabetes mellitus (GDM) in third trimester    4. Rh negative status during pregnancy in third trimester    5. Anxiety during pregnancy in third trimester, antepartum    6. - Prior poor obstetrical history, antepartum    7. - Abnormal prenatal test    8. Rh negative status during pregnancy in second trimester      Labor unit and pre-eclampsia precautions given.  Fetal kick count instructions given to patient.     Follow Up:  Follow up in about 2 weeks (around 5/7/2025) for OB VS.            [1]   Social History  Tobacco Use    Smoking status: Never     Passive exposure: Never    Smokeless tobacco: Never   Substance Use Topics    Alcohol use: Not Currently     Comment: social    Drug use: Never   [2]   Current Outpatient Medications:     busPIRone (BUSPAR) 5 MG Tab, Take 5 mg by mouth 2 (two) times daily as needed., Disp: , Rfl:     calcium carbonate (OS-DEMETRI) 500 mg calcium (1,250 mg) tablet, Take 2 tablets (1,000 mg total) by mouth 2 (two) times daily., Disp: 60 tablet, Rfl: 5    docusate sodium (COLACE) 100 MG capsule, Take 100 mg by mouth 2 (two) times daily., Disp: , Rfl:     ferrous sulfate 324 mg (65 mg iron) TbEC, Take 325 mg by mouth once daily., Disp: , Rfl:     lancets 28 gauge Misc, USE TO CHECK BLOOD SUGAR 5 TIMES A DAY AS DIRECTED BY THE PROVIDER, Disp: , Rfl:     prenatal vit no.124/iron/folic (PRENATAL VITAMIN ORAL), Take 1 tablet by mouth once daily., Disp: , Rfl:     TRUE METRIX AIR GLUCOSE METER Misc, 5 (five) times daily., Disp: , Rfl:     TRUE METRIX GLUCOSE TEST STRIP Strp, 5 (five) times daily., Disp: , Rfl:     ZINC ORAL, Take by mouth., Disp: , Rfl:

## 2025-04-28 ENCOUNTER — PATIENT MESSAGE (OUTPATIENT)
Dept: OTHER | Facility: OTHER | Age: 35
End: 2025-04-28
Payer: COMMERCIAL

## 2025-04-30 NOTE — PROGRESS NOTES
Maternal Fetal Medicine follow up consult    SUBJECTIVE:     Rocky Burch is a 34 y.o.  female with IUP at 32w3d who is seen in follow up consultation by MFM.  Pregnancy complications include:   Problem   - Diet controlled gestational diabetes mellitus (GDM) in third trimester   - Twin B: EIF   - Monochorionic diamniotic twin gestation in third trimester   - Prior poor obstetrical history, antepartum   - Abnormal prenatal test     Rocky presents for routine follow up appointment.  Presents with BS log for review.  Planning primary  due to persistent breech presentation.   Doing very well without complaints. Last day of work is tomorrow then she's going on leave.  Denies LOF, VB, contractions. Positive fetal movement x2.    Previous notes reviewed.   No changes to medical, surgical, family, social, or obstetric history.  Interval history since last MFM visit: see above  Medications reviewed.    Care team members:  Dr Gibson - Primary OB     OBJECTIVE:   /72 (BP Location: Right arm, Patient Position: Sitting)   Pulse 84   Ht 5' (1.524 m)   Wt 80.8 kg (178 lb 2.1 oz)   LMP 2024 (Exact Date)   BMI 34.79 kg/m²     Ultrasound performed. See viewpoint for full ultrasound report.      ASSESSMENT/PLAN:     34 y.o.  female with IUP at 32w3d    - Diet controlled gestational diabetes mellitus (GDM) in third trimester  Previously counseled the patient regarding the risks of gestational diabetes, which include macrosomia, hypertensive disorders of pregnancy,  hypoglycemia, shoulder dystocia/birth trauma, polyhydramnios, and increased risk of  delivery.  Patients requiring medical therapy have an increased risk of stillbirth.  Discussed that  outcome is linked to her ability to achieve glycemic control.  The goal of treatment is to have a fasting blood sugar between 70 and 95 mg/dL and 2 hour postprandials less than 120 mg/dL.      She is checking her sugars four times  daily and presents with a log for review - excellent glycemic control. Ok to de-escalate to BID checks (fasting and rotate meals)    Recommendations:  MFM will review blood sugars in 1 week via portal  Regimen: Diet  Growth scans and antepartum testing discussed in twin plan  Check fasting blood sugar in hospital postpartum.   If normal, discontinue therapy and monitoring and recommend repeat postpartum glucose testing in 6-8 weeks postpartum using a 75 g oral glucose tolerance test.   If fasting blood glucose is between 100-125 mg/dl or impaired glucose tolerance is noted on 2 hour glucose test, refer to primary care as appropriate.   An ultrasound for estimated fetal weight should be obtained within 3 weeks of anticipated delivery; if the EFW is >= 4500 grams, a  should be offered.        - Monochorionic diamniotic twin gestation in third trimester  We discussed monochorionic twinning and reviewed the types of placentation seen and their frequency. I counseled her on the increased incidence of preeclampsia/gestational hypertension, gestational diabetes, fetal growth restriction, anemia, congenital anomalies, need for antepartum hospitalization,  labor/PPROM, stillbirth, and risk of postpartum hemorrhage that can occur in twin pregnancies. It was explained that all monochorionic twins, have a single placenta and that this puts them at risk for twin transfusion syndrome (TTTS). We discussed twin-transfusion syndrome which is seen in ~ 10-15% of monochorionic twins due to unbalanced vascular communications within the placenta and can result in discordant fetal growth and fluid volume. We discussed that in its severe form, it can potentially result in  delivery or fetal morbidity or mortality due to poor placental function of the smaller twin and volume overload and cardiomyopathy of the larger twin. Treatment may consist of observation or laser photocoagulation of communicating vessels depending  upon the circumstances and timing of the presentation. I reviewed with the patient the need for fetal ultrasound assessment for TTTS surveillance every two weeks starting at 16 weeks.     25- no abnormalities noted on ultrasound today.  Normal-appearing monochorionic diamniotic gestation with no signs of TTTS.  3/6/25- Twin A (vtx, maternal left), Twin B (transverse, head maternal left). No evidence of TTTS - each baby appropriately grown.  25- No evidence of TTTS/TAPS. Each baby appropriately grown. 6% discordance.  25- No evidence of TTTS/TAPS. Each baby appropriately grown. 4% discordance    Recommendations:   Fetal ultrasound assessment every two weeks, starting at 16 weeks until delivery for TTTS surveillance (scheduled with MFM)  If high suspicion for evolving TTTS, increase ultrasound surveillance to once or twice weekly  Transvaginal cervical length screening at time of targeted anatomy ultrasound w/ MFM  Detailed anatomy surveys at 18-20 weeks  Fetal growth ultrasounds every 3-4 weeks starting at 23-24 weeks (scheduled with MFM)  Fetal echocardiogram at 22-24 weeks (scheduled 3/11)  stopped due to nose bleedsFolic acid 1 mg daily and ferrous sulfate 325 mg daily  Increase daily dietary intake by approximately 300 kcal above that for a rodrigues pregnancy, or 600 kcal over that of a nonpregnant woman and monitor weight gain   testing with weekly NST and AFV assessment beginning at 32 weeks (can alternate with MFM every other week)  Given the increased risks of a twin pregnancy, prenatal visits every 2-3 weeks from 24 - 32/34 weeks and weekly prenatal visits thereafter    Delivery timing:   Normal growth, no complications: 37 0/7 - 37 6/7 weeks gestation   Normal growth, maternal comorbidities: 36 0/7 - 36 6/7 weeks gestation   FGR of one or both: 34 0/7 - 35 6/7 weeks gestation  History of TTTS, FGR with abnormal UA Doppler, oligohydramnios, or comorbid conditions: 32 0/7 - 34 6/7 weeks  gestation    Comorbid conditions include: BMI >= 40, diabetes, hypertension, and complex maternal medical conditions associated with placental dysfunction (Lupus, renal disease, or other vascular disease).      - Prior poor obstetrical history, antepartum  I reviewed the patient's obstetric history which is remarkable for a previous stillbirth at 33 weeks gestation. Obstetric history remarkable for: Trisomy 18 with that pregnancy. Multiple anomalies noted on prenatal ultrasound consistent with diagnosis.    I counseled the patient regarding the risk for recurrent stillbirth. The recurrence risk depends on the underlying etiology. The cause of her previous pregnancy loss was Trisomy 18 as this diagnosis is incompatible with life. We have discussed the recurrence risk of chromosome abnormalities in subsequent pregnancies is estimated around 1% until age related risk exceeds this estimation.    Recommendations:  Genetic testing in all subsequent pregnancies (see separate documentation)      - Twin B: EIF  We discussed the significance of an echogenic focus (EIF) in the ventricle of the fetal heart. This is not a true structural abnormality and is not associated with congenital heart disease or abnormal cardiac function. It is seen as a normal variant in about 5 - 7% of pregnancies. Down syndrome fetuses have a higher incidence of echogenic foci than normal fetuses, therefore it is considered to be a potential marker for fetal Down syndrome. In a woman with other risk factors for Down syndrome (advanced maternal age, elevated quad screen risk or presence of other markers), the presence of an echogenic focus may increase the relative risk of Down syndrome slightly. In a younger woman (< age 35) or in a woman without other risk factors or markers for Down syndrome, the significance of an isolated echogenic focus is minimal. The overwhelming majority (99%) of these fetuses will not have Down syndrome. The presence of an  "EIF is an indication for a targeted anatomic survey, which was done today. No fetal major structural malformations or other minor markers associated with Down Syndrome were identified. Serum screening, either with a quad screen or cfDNA testing, can be used to provide a quantitative estimate of the chance for fetal Down Syndrome. She has completed NIPT with no elevated risk of DS.     4/1/25- Twin B - EIF remains present. Anticipate spontaneous resolution in coming weeks. S/p fetal echo - normal.    5/1/25- EIF resolved    - Abnormal prenatal test  Screening for common fetal aneuploidies is typically completed via cell-free DNA testing.  In all people, small pieces of genetic material (DNA) that are not contained within cells are present in the blood stream. During pregnancy, these small pieces of DNA are a mixture of the mother's DNA and DNA shed from the placenta. This test can indicate if there is an abnormal number of chromosomes 21, 18, 13, X, and Y. The approximate detection rate is 99% for Trisomy 21, 98% for Trisomy 18, 91% for Trisomy 13, and 90% for George Syndrome. The false positive rates are low ranging from 1/1000 to 2/1000.     This test may also incidentally determine other sources of aneuploidy which may not be fetal in origin. Inherent in this test is the chance to identify placental mosaicism which can occur about 1-2% of the time.     NIPT results via [facility] concluded fetal free DNA detected is monozygotic. However, no fetal fraction, gender, or risk of chromosome abnormalities reported. 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 This finding could also be due to normal variation and/or confined to placental tissue... Repeat cell-free DNA testing is not recommended..."    We have discussed the uncertainty of this test with multiple possibilities, one of which includes normal " "variation. We have discussed invasive testing options as well as risks/benefits/alternatives of each. It is possible that she or her partner are carrying a balanced translocation that increases their risk of aneuploidy. She and her  have not yet undergone parental karyotype to evaluate this. Genetic testing result from her baby "Suzanne" with trisomy 18 was not available today (she will try to locate it) but it may be helpful to understand if this is the case based on the type of test it was. Currently the NIPT findings suggest a possible abnormality but US is reassuring and could be confined to placenta.    At this time, she wishes to hold off on further testing. She will notify our clinic if she changes her mind or would like to proceed with diagnostic genetic testing. She is considering amniocentesis at 15-16 weeks.  She is interested in genetic counseling. We will refer to Madison Patino. Her case was discussed with genetics counselor today.    1/9/25- Had consult w/ genetics counselor (Madison). Politely declines any further testing at this time.       F/u in 2 weeks for TTTS surveillance  F/u in 4 weeks for MFM visit /growth ultrasound      "

## 2025-04-30 NOTE — ASSESSMENT & PLAN NOTE
We discussed the significance of an echogenic focus (EIF) in the ventricle of the fetal heart. This is not a true structural abnormality and is not associated with congenital heart disease or abnormal cardiac function. It is seen as a normal variant in about 5 - 7% of pregnancies. Down syndrome fetuses have a higher incidence of echogenic foci than normal fetuses, therefore it is considered to be a potential marker for fetal Down syndrome. In a woman with other risk factors for Down syndrome (advanced maternal age, elevated quad screen risk or presence of other markers), the presence of an echogenic focus may increase the relative risk of Down syndrome slightly. In a younger woman (< age 35) or in a woman without other risk factors or markers for Down syndrome, the significance of an isolated echogenic focus is minimal. The overwhelming majority (99%) of these fetuses will not have Down syndrome. The presence of an EIF is an indication for a targeted anatomic survey, which was done today. No fetal major structural malformations or other minor markers associated with Down Syndrome were identified. Serum screening, either with a quad screen or cfDNA testing, can be used to provide a quantitative estimate of the chance for fetal Down Syndrome. She has completed NIPT with no elevated risk of DS.     4/1/25- Twin B - EIF remains present. Anticipate spontaneous resolution in coming weeks. S/p fetal echo - normal.    5/1/25- EIF resolved

## 2025-04-30 NOTE — ASSESSMENT & PLAN NOTE
Previously counseled the patient regarding the risks of gestational diabetes, which include macrosomia, hypertensive disorders of pregnancy,  hypoglycemia, shoulder dystocia/birth trauma, polyhydramnios, and increased risk of  delivery.  Patients requiring medical therapy have an increased risk of stillbirth.  Discussed that  outcome is linked to her ability to achieve glycemic control.  The goal of treatment is to have a fasting blood sugar between 70 and 95 mg/dL and 2 hour postprandials less than 120 mg/dL.      She is checking her sugars four times daily and presents with a log for review - excellent glycemic control. Ok to de-escalate to BID checks (fasting and rotate meals)    Recommendations:  MFM will review blood sugars in 1 week via portal  Regimen: Diet  Growth scans and antepartum testing discussed in twin plan  Check fasting blood sugar in hospital postpartum.   If normal, discontinue therapy and monitoring and recommend repeat postpartum glucose testing in 6-8 weeks postpartum using a 75 g oral glucose tolerance test.   If fasting blood glucose is between 100-125 mg/dl or impaired glucose tolerance is noted on 2 hour glucose test, refer to primary care as appropriate.   An ultrasound for estimated fetal weight should be obtained within 3 weeks of anticipated delivery; if the EFW is >= 4500 grams, a  should be offered.

## 2025-04-30 NOTE — ASSESSMENT & PLAN NOTE
We discussed monochorionic twinning and reviewed the types of placentation seen and their frequency. I counseled her on the increased incidence of preeclampsia/gestational hypertension, gestational diabetes, fetal growth restriction, anemia, congenital anomalies, need for antepartum hospitalization,  labor/PPROM, stillbirth, and risk of postpartum hemorrhage that can occur in twin pregnancies. It was explained that all monochorionic twins, have a single placenta and that this puts them at risk for twin transfusion syndrome (TTTS). We discussed twin-transfusion syndrome which is seen in ~ 10-15% of monochorionic twins due to unbalanced vascular communications within the placenta and can result in discordant fetal growth and fluid volume. We discussed that in its severe form, it can potentially result in  delivery or fetal morbidity or mortality due to poor placental function of the smaller twin and volume overload and cardiomyopathy of the larger twin. Treatment may consist of observation or laser photocoagulation of communicating vessels depending upon the circumstances and timing of the presentation. I reviewed with the patient the need for fetal ultrasound assessment for TTTS surveillance every two weeks starting at 16 weeks.     25- no abnormalities noted on ultrasound today.  Normal-appearing monochorionic diamniotic gestation with no signs of TTTS.  3/6/25- Twin A (vtx, maternal left), Twin B (transverse, head maternal left). No evidence of TTTS - each baby appropriately grown.  25- No evidence of TTTS/TAPS. Each baby appropriately grown. 6% discordance.  25- No evidence of TTTS/TAPS. Each baby appropriately grown. 4% discordance    Recommendations:   Fetal ultrasound assessment every two weeks, starting at 16 weeks until delivery for TTTS surveillance (scheduled with [facility])  If high suspicion for evolving TTTS, increase ultrasound surveillance to once or twice weekly  Transvaginal  cervical length screening at time of targeted anatomy ultrasound w/ MFM  Detailed anatomy surveys at 18-20 weeks  Fetal growth ultrasounds every 3-4 weeks starting at 23-24 weeks (scheduled with MF)  Fetal echocardiogram at 22-24 weeks (scheduled 3/11)  stopped due to nose bleedsFolic acid 1 mg daily and ferrous sulfate 325 mg daily  Increase daily dietary intake by approximately 300 kcal above that for a rodrigues pregnancy, or 600 kcal over that of a nonpregnant woman and monitor weight gain   testing with weekly NST and AFV assessment beginning at 32 weeks (can alternate with MFM every other week)  Given the increased risks of a twin pregnancy, prenatal visits every 2-3 weeks from 24 - 32/34 weeks and weekly prenatal visits thereafter    Delivery timing:   Normal growth, no complications: 37 0/7 - 37 6/7 weeks gestation   Normal growth, maternal comorbidities: 36 0/7 - 36 6/7 weeks gestation   FGR of one or both: 34 0/7 - 35 6/7 weeks gestation  History of TTTS, FGR with abnormal UA Doppler, oligohydramnios, or comorbid conditions: 32 0/7 - 34 6/7 weeks gestation    Comorbid conditions include: BMI >= 40, diabetes, hypertension, and complex maternal medical conditions associated with placental dysfunction (Lupus, renal disease, or other vascular disease).

## 2025-04-30 NOTE — ASSESSMENT & PLAN NOTE
"Screening for common fetal aneuploidies is typically completed via cell-free DNA testing.  In all people, small pieces of genetic material (DNA) that are not contained within cells are present in the blood stream. During pregnancy, these small pieces of DNA are a mixture of the mother's DNA and DNA shed from the placenta. This test can indicate if there is an abnormal number of chromosomes 21, 18, 13, X, and Y. The approximate detection rate is 99% for Trisomy 21, 98% for Trisomy 18, 91% for Trisomy 13, and 90% for George Syndrome. The false positive rates are low ranging from 1/1000 to 2/1000.     This test may also incidentally determine other sources of aneuploidy which may not be fetal in origin. Inherent in this test is the chance to identify placental mosaicism which can occur about 1-2% of the time.     NIPT results via [email or web address] concluded fetal free DNA detected is monozygotic. However, no fetal fraction, gender, or risk of chromosome abnormalities reported. 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 This finding could also be due to normal variation and/or confined to placental tissue... Repeat cell-free DNA testing is not recommended..."    We have discussed the uncertainty of this test with multiple possibilities, one of which includes normal variation. We have discussed invasive testing options as well as risks/benefits/alternatives of each. It is possible that she or her partner are carrying a balanced translocation that increases their risk of aneuploidy. She and her  have not yet undergone parental karyotype to evaluate this. Genetic testing result from her baby "Suzanne" with trisomy 18 was not available today (she will try to locate it) but it may be helpful to understand if this is the case based on the type of test it was. Currently the NIPT findings suggest a possible " abnormality but US is reassuring and could be confined to placenta.    At this time, she wishes to hold off on further testing. She will notify our clinic if she changes her mind or would like to proceed with diagnostic genetic testing. She is considering amniocentesis at 15-16 weeks.  She is interested in genetic counseling. We will refer to Madison Patino. Her case was discussed with genetics counselor today.    1/9/25- Had consult w/ genetics counselor (Madison). Politely declines any further testing at this time.

## 2025-05-01 ENCOUNTER — OFFICE VISIT (OUTPATIENT)
Dept: MATERNAL FETAL MEDICINE | Facility: CLINIC | Age: 35
End: 2025-05-01
Payer: COMMERCIAL

## 2025-05-01 ENCOUNTER — PROCEDURE VISIT (OUTPATIENT)
Dept: MATERNAL FETAL MEDICINE | Facility: CLINIC | Age: 35
End: 2025-05-01
Payer: COMMERCIAL

## 2025-05-01 VITALS
DIASTOLIC BLOOD PRESSURE: 72 MMHG | SYSTOLIC BLOOD PRESSURE: 114 MMHG | BODY MASS INDEX: 34.97 KG/M2 | HEIGHT: 60 IN | HEART RATE: 84 BPM | WEIGHT: 178.13 LBS

## 2025-05-01 DIAGNOSIS — O24.410 DIET CONTROLLED GESTATIONAL DIABETES MELLITUS (GDM) IN THIRD TRIMESTER: Primary | ICD-10-CM

## 2025-05-01 DIAGNOSIS — O30.033 MONOCHORIONIC DIAMNIOTIC TWIN GESTATION IN THIRD TRIMESTER: ICD-10-CM

## 2025-05-01 DIAGNOSIS — O28.9 ABNORMAL PRENATAL TEST: ICD-10-CM

## 2025-05-01 DIAGNOSIS — O09.299 PRIOR POOR OBSTETRICAL HISTORY, ANTEPARTUM: ICD-10-CM

## 2025-05-01 DIAGNOSIS — O35.BXX2: ICD-10-CM

## 2025-05-01 PROCEDURE — 76819 FETAL BIOPHYS PROFIL W/O NST: CPT | Mod: 59,S$GLB,, | Performed by: OBSTETRICS & GYNECOLOGY

## 2025-05-01 PROCEDURE — 76821 MIDDLE CEREBRAL ARTERY ECHO: CPT | Mod: 59,S$GLB,, | Performed by: OBSTETRICS & GYNECOLOGY

## 2025-05-01 PROCEDURE — 76816 OB US FOLLOW-UP PER FETUS: CPT | Mod: 59,S$GLB,, | Performed by: OBSTETRICS & GYNECOLOGY

## 2025-05-01 PROCEDURE — 76820 UMBILICAL ARTERY ECHO: CPT | Mod: 59,S$GLB,, | Performed by: OBSTETRICS & GYNECOLOGY

## 2025-05-02 NOTE — PROGRESS NOTES
HPI  Rocky Burch is a 34 y.o.   33w2d here for Routine Prenatal Visit. NO C/O'S.T-DAP GIVEN TODAY. Blood sugar logs reviewed. Excellent control with diet. Only testing FBS and one meal with postprandials.    Denies any VB, ROM, and PIH sxs. Reports active fetal movements.     Rocky's allergies were reviewed and updated as appropriate.    Past Medical History:   Diagnosis Date    Anxiety disorder, unspecified     Constipation     Fetal demise, greater than 22 weeks, antepartum, fetus 1 2023    Maternal care for (suspected) chromosomal abnormality in fetus, trisomy 18, not applicable or unspecified 2023    Molar pregnancy     Rh negative state in antepartum period, third trimester 2023    Stillborn, abnormal      Family History   Problem Relation Name Age of Onset    Depression Father      Hypertension Father      Gestational diabetes Mother       Social History[1]  OB History    Para Term  AB Living   3 1  1 1    SAB IAB Ectopic Multiple Live Births   1 0  0       # Outcome Date GA Lbr José Miguel/2nd Weight Sex Type Anes PTL Lv   3 Current            2 SAB 25     SAB         Birth Comments: molar pregnancy   1  23 33w2d 02:25 / 00:18 0.959 kg (2 lb 1.8 oz) F Vag-Spont None  FD      Complications: Trisomy 13, unspecified     Current Medications[2]    ROS:  A comprehensive review of symptoms was completed and negative except as noted above.    Physical Exam:    Chaperone present for exam.    /68   Wt 78.7 kg (173 lb 6.4 oz)   LMP 2024 (Exact Date)   BMI 33.86 kg/m²     Gen: No distress  Abdomen: Gravid, non-tender  Pelvic: deferred  Extremities: No edema    Instructed on use of glucose meter, patient has a meter at home & monitors her glucose.  Pt did/did not bring logs to today's visit.    Fetal Heart Rate: 143 (BABY) 150 (BABY B)    Movement: Present x 2      Last PAP Date: 2024    ASSESSMENT/PLAN:    1. Monochorionic diamniotic twin gestation in  third trimester  -     POCT Urinalysis, Dipstick, Automated, W/O Scope    2. 33 weeks gestation of pregnancy    3. Diet controlled gestational diabetes mellitus (GDM) in third trimester    4. Rh negative status during pregnancy in third trimester    5. Anxiety during pregnancy in third trimester, antepartum    6. - Prior poor obstetrical history, antepartum    7. Need for Tdap vaccination  -     Tdap vaccine injection 0.5 mL    Labor unit and pre-eclampsia precautions given.  Fetal kick count instructions given to patient.     Follow Up:  Follow up in about 2 weeks (around 5/21/2025) for OB VS.       This note was transcribed by Margie Slater. There may be transcription errors as a result, however minimal. Effort has been made to ensure accuracy of transcription, but any obvious errors or omissions should be clarified with the author of the document.       I agree with the above documentation.               [1]   Social History  Tobacco Use    Smoking status: Never     Passive exposure: Never    Smokeless tobacco: Never   Substance Use Topics    Alcohol use: Not Currently     Comment: social    Drug use: Never   [2]   Current Outpatient Medications:     busPIRone (BUSPAR) 5 MG Tab, Take 5 mg by mouth 2 (two) times daily as needed., Disp: , Rfl:     calcium carbonate (OS-DEMETRI) 500 mg calcium (1,250 mg) tablet, Take 2 tablets (1,000 mg total) by mouth 2 (two) times daily., Disp: 60 tablet, Rfl: 5    docusate sodium (COLACE) 100 MG capsule, Take 100 mg by mouth 2 (two) times daily., Disp: , Rfl:     ferrous sulfate 324 mg (65 mg iron) TbEC, Take 325 mg by mouth once daily., Disp: , Rfl:     lancets 28 gauge Misc, USE TO CHECK BLOOD SUGAR 5 TIMES A DAY AS DIRECTED BY THE PROVIDER, Disp: , Rfl:     prenatal vit no.124/iron/folic (PRENATAL VITAMIN ORAL), Take 1 tablet by mouth once daily., Disp: , Rfl:     TRUE METRIX AIR GLUCOSE METER Misc, 5 (five) times daily., Disp: , Rfl:     TRUE METRIX GLUCOSE TEST STRIP Strp, 5  (five) times daily., Disp: , Rfl:     ZINC ORAL, Take by mouth., Disp: , Rfl:   No current facility-administered medications for this visit.

## 2025-05-07 ENCOUNTER — ROUTINE PRENATAL (OUTPATIENT)
Dept: OBSTETRICS AND GYNECOLOGY | Facility: CLINIC | Age: 35
End: 2025-05-07
Payer: COMMERCIAL

## 2025-05-07 VITALS
SYSTOLIC BLOOD PRESSURE: 120 MMHG | WEIGHT: 173.38 LBS | DIASTOLIC BLOOD PRESSURE: 68 MMHG | BODY MASS INDEX: 33.86 KG/M2

## 2025-05-07 DIAGNOSIS — O99.343 ANXIETY DURING PREGNANCY IN THIRD TRIMESTER, ANTEPARTUM: ICD-10-CM

## 2025-05-07 DIAGNOSIS — Z3A.33 33 WEEKS GESTATION OF PREGNANCY: ICD-10-CM

## 2025-05-07 DIAGNOSIS — Z23 NEED FOR TDAP VACCINATION: ICD-10-CM

## 2025-05-07 DIAGNOSIS — Z67.91 RH NEGATIVE STATUS DURING PREGNANCY IN THIRD TRIMESTER: ICD-10-CM

## 2025-05-07 DIAGNOSIS — O26.893 RH NEGATIVE STATUS DURING PREGNANCY IN THIRD TRIMESTER: ICD-10-CM

## 2025-05-07 DIAGNOSIS — O09.299 PRIOR POOR OBSTETRICAL HISTORY, ANTEPARTUM: ICD-10-CM

## 2025-05-07 DIAGNOSIS — O30.033 MONOCHORIONIC DIAMNIOTIC TWIN GESTATION IN THIRD TRIMESTER: Primary | ICD-10-CM

## 2025-05-07 DIAGNOSIS — O24.410 DIET CONTROLLED GESTATIONAL DIABETES MELLITUS (GDM) IN THIRD TRIMESTER: ICD-10-CM

## 2025-05-07 DIAGNOSIS — F41.9 ANXIETY DURING PREGNANCY IN THIRD TRIMESTER, ANTEPARTUM: ICD-10-CM

## 2025-05-07 LAB
BILIRUB UR QL STRIP: NEGATIVE
GLUCOSE UR QL STRIP: NEGATIVE
KETONES UR QL STRIP: POSITIVE
LEUKOCYTE ESTERASE UR QL STRIP: NEGATIVE
PH, POC UA: 6.5
POC BLOOD, URINE: NEGATIVE
POC NITRATES, URINE: NEGATIVE
PROT UR QL STRIP: NEGATIVE
SP GR UR STRIP: 1.03 (ref 1–1.03)
UROBILINOGEN UR STRIP-ACNC: 0.2 (ref 0.1–1.1)

## 2025-05-08 ENCOUNTER — ANESTHESIA EVENT (OUTPATIENT)
Dept: OBSTETRICS AND GYNECOLOGY | Facility: HOSPITAL | Age: 35
End: 2025-05-08
Payer: COMMERCIAL

## 2025-05-08 ENCOUNTER — HOSPITAL ENCOUNTER (INPATIENT)
Facility: HOSPITAL | Age: 35
LOS: 4 days | Discharge: HOME | End: 2025-05-12
Attending: SPECIALIST | Admitting: SPECIALIST
Payer: COMMERCIAL

## 2025-05-08 ENCOUNTER — ANESTHESIA (OUTPATIENT)
Dept: OBSTETRICS AND GYNECOLOGY | Facility: HOSPITAL | Age: 35
End: 2025-05-08
Payer: COMMERCIAL

## 2025-05-08 DIAGNOSIS — O42.919 PRETERM PREMATURE RUPTURE OF MEMBRANES (PPROM) WITH UNKNOWN ONSET OF LABOR: Primary | ICD-10-CM

## 2025-05-08 DIAGNOSIS — O30.033 MONOCHORIONIC DIAMNIOTIC TWIN GESTATION IN THIRD TRIMESTER: ICD-10-CM

## 2025-05-08 DIAGNOSIS — Z98.891 S/P CESAREAN SECTION: ICD-10-CM

## 2025-05-08 LAB
ANTIBODY IDENTIFICATION: NORMAL
BASOPHILS # BLD AUTO: 0.03 X10(3)/MCL
BASOPHILS # BLD AUTO: 0.05 X10(3)/MCL
BASOPHILS NFR BLD AUTO: 0.2 %
BASOPHILS NFR BLD AUTO: 0.3 %
CTP QC/QA: YES
EOSINOPHIL # BLD AUTO: 0.02 X10(3)/MCL (ref 0–0.9)
EOSINOPHIL # BLD AUTO: 0.09 X10(3)/MCL (ref 0–0.9)
EOSINOPHIL NFR BLD AUTO: 0.1 %
EOSINOPHIL NFR BLD AUTO: 0.7 %
ERYTHROCYTE [DISTWIDTH] IN BLOOD BY AUTOMATED COUNT: 15 % (ref 11.5–17)
ERYTHROCYTE [DISTWIDTH] IN BLOOD BY AUTOMATED COUNT: 15.2 % (ref 11.5–17)
GROUP & RH: ABNORMAL
HCT VFR BLD AUTO: 33.6 % (ref 37–47)
HCT VFR BLD AUTO: 37.5 % (ref 37–47)
HGB BLD-MCNC: 11 G/DL (ref 12–16)
HGB BLD-MCNC: 12.2 G/DL (ref 12–16)
IMM GRANULOCYTES # BLD AUTO: 0.19 X10(3)/MCL (ref 0–0.04)
IMM GRANULOCYTES # BLD AUTO: 0.27 X10(3)/MCL (ref 0–0.04)
IMM GRANULOCYTES NFR BLD AUTO: 1.3 %
IMM GRANULOCYTES NFR BLD AUTO: 2.2 %
INDIRECT COOMBS: ABNORMAL
LYMPHOCYTES # BLD AUTO: 1.53 X10(3)/MCL (ref 0.6–4.6)
LYMPHOCYTES # BLD AUTO: 2.88 X10(3)/MCL (ref 0.6–4.6)
LYMPHOCYTES NFR BLD AUTO: 10.2 %
LYMPHOCYTES NFR BLD AUTO: 23.7 %
MCH RBC QN AUTO: 29.2 PG (ref 27–31)
MCH RBC QN AUTO: 29.4 PG (ref 27–31)
MCHC RBC AUTO-ENTMCNC: 32.5 G/DL (ref 33–36)
MCHC RBC AUTO-ENTMCNC: 32.7 G/DL (ref 33–36)
MCV RBC AUTO: 89.7 FL (ref 80–94)
MCV RBC AUTO: 89.8 FL (ref 80–94)
MONOCYTES # BLD AUTO: 1.31 X10(3)/MCL (ref 0.1–1.3)
MONOCYTES # BLD AUTO: 1.38 X10(3)/MCL (ref 0.1–1.3)
MONOCYTES NFR BLD AUTO: 11.4 %
MONOCYTES NFR BLD AUTO: 8.7 %
NEUTROPHILS # BLD AUTO: 11.91 X10(3)/MCL (ref 2.1–9.2)
NEUTROPHILS # BLD AUTO: 7.49 X10(3)/MCL (ref 2.1–9.2)
NEUTROPHILS NFR BLD AUTO: 61.8 %
NEUTROPHILS NFR BLD AUTO: 79.4 %
NRBC BLD AUTO-RTO: 0 %
NRBC BLD AUTO-RTO: 0 %
PLATELET # BLD AUTO: 119 X10(3)/MCL (ref 130–400)
PLATELET # BLD AUTO: 139 X10(3)/MCL (ref 130–400)
PMV BLD AUTO: 11.6 FL (ref 7.4–10.4)
PMV BLD AUTO: 11.6 FL (ref 7.4–10.4)
RBC # BLD AUTO: 3.74 X10(6)/MCL (ref 4.2–5.4)
RBC # BLD AUTO: 4.18 X10(6)/MCL (ref 4.2–5.4)
ROSETTE - FMH (FETAL BLEED SCREEN): NORMAL
RUPTURE OF MEMBRANE: POSITIVE
SPECIMEN OUTDATE: ABNORMAL
T PALLIDUM AB SER QL IF: ABNORMAL
T PALLIDUM AB SER QL: NONREACTIVE
WBC # BLD AUTO: 12.14 X10(3)/MCL (ref 4.5–11.5)
WBC # BLD AUTO: 15.01 X10(3)/MCL (ref 4.5–11.5)

## 2025-05-08 PROCEDURE — 27000492 HC SLEEVE, SCD T/L

## 2025-05-08 PROCEDURE — 63600175 PHARM REV CODE 636 W HCPCS

## 2025-05-08 PROCEDURE — 86870 RBC ANTIBODY IDENTIFICATION: CPT | Performed by: SPECIALIST

## 2025-05-08 PROCEDURE — 37000008 HC ANESTHESIA 1ST 15 MINUTES: Performed by: SPECIALIST

## 2025-05-08 PROCEDURE — 25000003 PHARM REV CODE 250: Performed by: SPECIALIST

## 2025-05-08 PROCEDURE — 37000009 HC ANESTHESIA EA ADD 15 MINS: Performed by: SPECIALIST

## 2025-05-08 PROCEDURE — 85461 HEMOGLOBIN FETAL: CPT | Performed by: SPECIALIST

## 2025-05-08 PROCEDURE — 36415 COLL VENOUS BLD VENIPUNCTURE: CPT | Performed by: SPECIALIST

## 2025-05-08 PROCEDURE — 86780 TREPONEMA PALLIDUM: CPT | Performed by: SPECIALIST

## 2025-05-08 PROCEDURE — 62322 NJX INTERLAMINAR LMBR/SAC: CPT

## 2025-05-08 PROCEDURE — 86850 RBC ANTIBODY SCREEN: CPT | Performed by: SPECIALIST

## 2025-05-08 PROCEDURE — 25000003 PHARM REV CODE 250

## 2025-05-08 PROCEDURE — 63600175 PHARM REV CODE 636 W HCPCS: Performed by: SPECIALIST

## 2025-05-08 PROCEDURE — 63600175 PHARM REV CODE 636 W HCPCS: Mod: JZ,TB | Performed by: OBSTETRICS & GYNECOLOGY

## 2025-05-08 PROCEDURE — 63600175 PHARM REV CODE 636 W HCPCS: Performed by: ANESTHESIOLOGY

## 2025-05-08 PROCEDURE — 99900035 HC TECH TIME PER 15 MIN (STAT)

## 2025-05-08 PROCEDURE — 36004725 HC OB OR TIME LEV III - EA ADD 15 MIN: Performed by: SPECIALIST

## 2025-05-08 PROCEDURE — 51702 INSERT TEMP BLADDER CATH: CPT

## 2025-05-08 PROCEDURE — 99285 EMERGENCY DEPT VISIT HI MDM: CPT | Mod: 25

## 2025-05-08 PROCEDURE — 84112 EVAL AMNIOTIC FLUID PROTEIN: CPT

## 2025-05-08 PROCEDURE — 71000033 HC RECOVERY, INTIAL HOUR: Performed by: SPECIALIST

## 2025-05-08 PROCEDURE — 36004724 HC OB OR TIME LEV III - 1ST 15 MIN: Performed by: SPECIALIST

## 2025-05-08 PROCEDURE — 11000001 HC ACUTE MED/SURG PRIVATE ROOM

## 2025-05-08 PROCEDURE — 85025 COMPLETE CBC W/AUTO DIFF WBC: CPT | Performed by: SPECIALIST

## 2025-05-08 RX ORDER — MUPIROCIN 20 MG/G
OINTMENT TOPICAL
Status: DISCONTINUED | OUTPATIENT
Start: 2025-05-08 | End: 2025-05-12 | Stop reason: HOSPADM

## 2025-05-08 RX ORDER — OXYTOCIN-SODIUM CHLORIDE 0.9% IV SOLN 30 UNIT/500ML 30-0.9/5 UT/ML-%
95 SOLUTION INTRAVENOUS CONTINUOUS PRN
Status: DISCONTINUED | OUTPATIENT
Start: 2025-05-08 | End: 2025-05-08

## 2025-05-08 RX ORDER — OXYTOCIN 10 [USP'U]/ML
INJECTION, SOLUTION INTRAMUSCULAR; INTRAVENOUS
Status: DISCONTINUED | OUTPATIENT
Start: 2025-05-08 | End: 2025-05-08

## 2025-05-08 RX ORDER — CARBOPROST TROMETHAMINE 250 UG/ML
250 INJECTION, SOLUTION INTRAMUSCULAR
Status: DISCONTINUED | OUTPATIENT
Start: 2025-05-08 | End: 2025-05-08

## 2025-05-08 RX ORDER — MORPHINE SULFATE 4 MG/ML
2 INJECTION, SOLUTION INTRAMUSCULAR; INTRAVENOUS
Status: DISCONTINUED | OUTPATIENT
Start: 2025-05-08 | End: 2025-05-12 | Stop reason: HOSPADM

## 2025-05-08 RX ORDER — MISOPROSTOL 100 UG/1
800 TABLET ORAL ONCE AS NEEDED
Status: DISCONTINUED | OUTPATIENT
Start: 2025-05-08 | End: 2025-05-12 | Stop reason: HOSPADM

## 2025-05-08 RX ORDER — BISACODYL 10 MG/1
10 SUPPOSITORY RECTAL ONCE AS NEEDED
Status: DISCONTINUED | OUTPATIENT
Start: 2025-05-08 | End: 2025-05-12 | Stop reason: HOSPADM

## 2025-05-08 RX ORDER — KETOROLAC TROMETHAMINE 30 MG/ML
INJECTION, SOLUTION INTRAMUSCULAR; INTRAVENOUS
Status: DISCONTINUED | OUTPATIENT
Start: 2025-05-08 | End: 2025-05-08

## 2025-05-08 RX ORDER — SODIUM CHLORIDE, SODIUM LACTATE, POTASSIUM CHLORIDE, CALCIUM CHLORIDE 600; 310; 30; 20 MG/100ML; MG/100ML; MG/100ML; MG/100ML
INJECTION, SOLUTION INTRAVENOUS CONTINUOUS PRN
Status: DISCONTINUED | OUTPATIENT
Start: 2025-05-08 | End: 2025-05-08

## 2025-05-08 RX ORDER — OXYTOCIN 10 [USP'U]/ML
10 INJECTION, SOLUTION INTRAMUSCULAR; INTRAVENOUS ONCE AS NEEDED
Status: DISCONTINUED | OUTPATIENT
Start: 2025-05-08 | End: 2025-05-12 | Stop reason: HOSPADM

## 2025-05-08 RX ORDER — OXYCODONE AND ACETAMINOPHEN 10; 325 MG/1; MG/1
1 TABLET ORAL EVERY 4 HOURS PRN
Status: DISCONTINUED | OUTPATIENT
Start: 2025-05-08 | End: 2025-05-08

## 2025-05-08 RX ORDER — EPHEDRINE SULFATE 50 MG/ML
INJECTION, SOLUTION INTRAVENOUS
Status: DISCONTINUED | OUTPATIENT
Start: 2025-05-08 | End: 2025-05-08

## 2025-05-08 RX ORDER — CEFAZOLIN SODIUM 1 G/3ML
2 INJECTION, POWDER, FOR SOLUTION INTRAMUSCULAR; INTRAVENOUS
Status: COMPLETED | OUTPATIENT
Start: 2025-05-08 | End: 2025-05-08

## 2025-05-08 RX ORDER — NALOXONE HCL 0.4 MG/ML
0.04 VIAL (ML) INJECTION
Status: DISCONTINUED | OUTPATIENT
Start: 2025-05-08 | End: 2025-05-12 | Stop reason: HOSPADM

## 2025-05-08 RX ORDER — DEXMEDETOMIDINE HYDROCHLORIDE 100 UG/ML
INJECTION, SOLUTION INTRAVENOUS
Status: DISCONTINUED | OUTPATIENT
Start: 2025-05-08 | End: 2025-05-08

## 2025-05-08 RX ORDER — OXYTOCIN-SODIUM CHLORIDE 0.9% IV SOLN 30 UNIT/500ML 30-0.9/5 UT/ML-%
95 SOLUTION INTRAVENOUS CONTINUOUS PRN
Status: DISCONTINUED | OUTPATIENT
Start: 2025-05-08 | End: 2025-05-12 | Stop reason: HOSPADM

## 2025-05-08 RX ORDER — BUPIVACAINE HYDROCHLORIDE 7.5 MG/ML
INJECTION, SOLUTION EPIDURAL; RETROBULBAR
Status: COMPLETED | OUTPATIENT
Start: 2025-05-08 | End: 2025-05-08

## 2025-05-08 RX ORDER — ADHESIVE BANDAGE
30 BANDAGE TOPICAL 2 TIMES DAILY PRN
Status: DISCONTINUED | OUTPATIENT
Start: 2025-05-09 | End: 2025-05-12 | Stop reason: HOSPADM

## 2025-05-08 RX ORDER — PRENATAL WITH FERROUS FUM AND FOLIC ACID 3080; 920; 120; 400; 22; 1.84; 3; 20; 10; 1; 12; 200; 27; 25; 2 [IU]/1; [IU]/1; MG/1; [IU]/1; MG/1; MG/1; MG/1; MG/1; MG/1; MG/1; UG/1; MG/1; MG/1; MG/1; MG/1
1 TABLET ORAL DAILY
Status: DISCONTINUED | OUTPATIENT
Start: 2025-05-08 | End: 2025-05-12 | Stop reason: HOSPADM

## 2025-05-08 RX ORDER — DOCUSATE SODIUM 100 MG/1
200 CAPSULE, LIQUID FILLED ORAL 2 TIMES DAILY
Status: DISCONTINUED | OUTPATIENT
Start: 2025-05-08 | End: 2025-05-12 | Stop reason: HOSPADM

## 2025-05-08 RX ORDER — SODIUM CHLORIDE, SODIUM LACTATE, POTASSIUM CHLORIDE, CALCIUM CHLORIDE 600; 310; 30; 20 MG/100ML; MG/100ML; MG/100ML; MG/100ML
INJECTION, SOLUTION INTRAVENOUS CONTINUOUS
Status: DISCONTINUED | OUTPATIENT
Start: 2025-05-08 | End: 2025-05-10

## 2025-05-08 RX ORDER — EPHEDRINE SULFATE 50 MG/ML
10 INJECTION, SOLUTION INTRAVENOUS
Status: ACTIVE | OUTPATIENT
Start: 2025-05-08 | End: 2025-05-08

## 2025-05-08 RX ORDER — MORPHINE SULFATE 4 MG/ML
4 INJECTION, SOLUTION INTRAMUSCULAR; INTRAVENOUS EVERY 4 HOURS PRN
Status: DISCONTINUED | OUTPATIENT
Start: 2025-05-08 | End: 2025-05-12 | Stop reason: HOSPADM

## 2025-05-08 RX ORDER — OXYTOCIN-SODIUM CHLORIDE 0.9% IV SOLN 30 UNIT/500ML 30-0.9/5 UT/ML-%
10 SOLUTION INTRAVENOUS ONCE AS NEEDED
Status: DISCONTINUED | OUTPATIENT
Start: 2025-05-08 | End: 2025-05-08

## 2025-05-08 RX ORDER — PROCHLORPERAZINE EDISYLATE 5 MG/ML
5 INJECTION INTRAMUSCULAR; INTRAVENOUS EVERY 6 HOURS PRN
Status: DISCONTINUED | OUTPATIENT
Start: 2025-05-08 | End: 2025-05-12 | Stop reason: HOSPADM

## 2025-05-08 RX ORDER — SODIUM CHLORIDE 0.9 % (FLUSH) 0.9 %
10 SYRINGE (ML) INJECTION
Status: DISCONTINUED | OUTPATIENT
Start: 2025-05-08 | End: 2025-05-12 | Stop reason: HOSPADM

## 2025-05-08 RX ORDER — MORPHINE SULFATE 1 MG/ML
INJECTION, SOLUTION EPIDURAL; INTRATHECAL; INTRAVENOUS
Status: DISCONTINUED | OUTPATIENT
Start: 2025-05-08 | End: 2025-05-08

## 2025-05-08 RX ORDER — DIPHENHYDRAMINE HCL 25 MG
25 CAPSULE ORAL EVERY 4 HOURS PRN
Status: DISCONTINUED | OUTPATIENT
Start: 2025-05-08 | End: 2025-05-12 | Stop reason: HOSPADM

## 2025-05-08 RX ORDER — OXYTOCIN-SODIUM CHLORIDE 0.9% IV SOLN 30 UNIT/500ML 30-0.9/5 UT/ML-%
10 SOLUTION INTRAVENOUS ONCE AS NEEDED
Status: DISCONTINUED | OUTPATIENT
Start: 2025-05-08 | End: 2025-05-12 | Stop reason: HOSPADM

## 2025-05-08 RX ORDER — CARBOPROST TROMETHAMINE 250 UG/ML
250 INJECTION, SOLUTION INTRAMUSCULAR
Status: DISCONTINUED | OUTPATIENT
Start: 2025-05-08 | End: 2025-05-12 | Stop reason: HOSPADM

## 2025-05-08 RX ORDER — OXYTOCIN-SODIUM CHLORIDE 0.9% IV SOLN 30 UNIT/500ML 30-0.9/5 UT/ML-%
95 SOLUTION INTRAVENOUS ONCE AS NEEDED
Status: DISCONTINUED | OUTPATIENT
Start: 2025-05-08 | End: 2025-05-12 | Stop reason: HOSPADM

## 2025-05-08 RX ORDER — AMOXICILLIN 250 MG
1 CAPSULE ORAL NIGHTLY PRN
Status: DISCONTINUED | OUTPATIENT
Start: 2025-05-08 | End: 2025-05-12 | Stop reason: HOSPADM

## 2025-05-08 RX ORDER — PHENYLEPHRINE HCL IN 0.9% NACL 1 MG/10 ML
SYRINGE (ML) INTRAVENOUS
Status: DISCONTINUED | OUTPATIENT
Start: 2025-05-08 | End: 2025-05-08

## 2025-05-08 RX ORDER — KETOROLAC TROMETHAMINE 30 MG/ML
30 INJECTION, SOLUTION INTRAMUSCULAR; INTRAVENOUS EVERY 6 HOURS
Status: COMPLETED | OUTPATIENT
Start: 2025-05-08 | End: 2025-05-08

## 2025-05-08 RX ORDER — SODIUM CITRATE AND CITRIC ACID MONOHYDRATE 334; 500 MG/5ML; MG/5ML
30 SOLUTION ORAL
Status: DISCONTINUED | OUTPATIENT
Start: 2025-05-08 | End: 2025-05-12 | Stop reason: HOSPADM

## 2025-05-08 RX ORDER — MISOPROSTOL 100 UG/1
800 TABLET ORAL
Status: DISCONTINUED | OUTPATIENT
Start: 2025-05-08 | End: 2025-05-12 | Stop reason: HOSPADM

## 2025-05-08 RX ORDER — SODIUM CITRATE AND CITRIC ACID MONOHYDRATE 334; 500 MG/5ML; MG/5ML
30 SOLUTION ORAL ONCE
Status: DISCONTINUED | OUTPATIENT
Start: 2025-05-08 | End: 2025-05-12 | Stop reason: HOSPADM

## 2025-05-08 RX ORDER — ONDANSETRON HYDROCHLORIDE 2 MG/ML
INJECTION, SOLUTION INTRAVENOUS
Status: DISCONTINUED | OUTPATIENT
Start: 2025-05-08 | End: 2025-05-08

## 2025-05-08 RX ORDER — NALBUPHINE HYDROCHLORIDE 10 MG/ML
2.5 INJECTION INTRAMUSCULAR; INTRAVENOUS; SUBCUTANEOUS ONCE AS NEEDED
Status: DISCONTINUED | OUTPATIENT
Start: 2025-05-08 | End: 2025-05-12 | Stop reason: HOSPADM

## 2025-05-08 RX ORDER — ACETAMINOPHEN 10 MG/ML
INJECTION, SOLUTION INTRAVENOUS
Status: DISCONTINUED | OUTPATIENT
Start: 2025-05-08 | End: 2025-05-08

## 2025-05-08 RX ORDER — DIPHENOXYLATE HYDROCHLORIDE AND ATROPINE SULFATE 2.5; .025 MG/1; MG/1
2 TABLET ORAL EVERY 6 HOURS PRN
Status: DISCONTINUED | OUTPATIENT
Start: 2025-05-08 | End: 2025-05-12 | Stop reason: HOSPADM

## 2025-05-08 RX ORDER — MUPIROCIN 20 MG/G
OINTMENT TOPICAL 2 TIMES DAILY
Status: DISCONTINUED | OUTPATIENT
Start: 2025-05-08 | End: 2025-05-12 | Stop reason: HOSPADM

## 2025-05-08 RX ORDER — ONDANSETRON HYDROCHLORIDE 2 MG/ML
4 INJECTION, SOLUTION INTRAVENOUS EVERY 12 HOURS PRN
Status: DISCONTINUED | OUTPATIENT
Start: 2025-05-08 | End: 2025-05-12 | Stop reason: HOSPADM

## 2025-05-08 RX ORDER — FENTANYL CITRATE 50 UG/ML
INJECTION, SOLUTION INTRAMUSCULAR; INTRAVENOUS
Status: DISCONTINUED | OUTPATIENT
Start: 2025-05-08 | End: 2025-05-08

## 2025-05-08 RX ORDER — ONDANSETRON 4 MG/1
8 TABLET, ORALLY DISINTEGRATING ORAL EVERY 8 HOURS PRN
Status: DISCONTINUED | OUTPATIENT
Start: 2025-05-08 | End: 2025-05-08

## 2025-05-08 RX ORDER — OXYTOCIN-SODIUM CHLORIDE 0.9% IV SOLN 30 UNIT/500ML 30-0.9/5 UT/ML-%
SOLUTION INTRAVENOUS
Status: DISCONTINUED | OUTPATIENT
Start: 2025-05-08 | End: 2025-05-08

## 2025-05-08 RX ORDER — METHYLERGONOVINE MALEATE 0.2 MG/ML
200 INJECTION INTRAVENOUS ONCE AS NEEDED
Status: DISCONTINUED | OUTPATIENT
Start: 2025-05-08 | End: 2025-05-12 | Stop reason: HOSPADM

## 2025-05-08 RX ORDER — METHYLERGONOVINE MALEATE 0.2 MG/ML
200 INJECTION INTRAVENOUS
Status: DISCONTINUED | OUTPATIENT
Start: 2025-05-08 | End: 2025-05-12 | Stop reason: HOSPADM

## 2025-05-08 RX ORDER — FAMOTIDINE 10 MG/ML
20 INJECTION, SOLUTION INTRAVENOUS
Status: DISCONTINUED | OUTPATIENT
Start: 2025-05-08 | End: 2025-05-12 | Stop reason: HOSPADM

## 2025-05-08 RX ORDER — SIMETHICONE 80 MG
1 TABLET,CHEWABLE ORAL EVERY 6 HOURS PRN
Status: DISCONTINUED | OUTPATIENT
Start: 2025-05-08 | End: 2025-05-12 | Stop reason: HOSPADM

## 2025-05-08 RX ADMIN — KETOROLAC TROMETHAMINE 30 MG: 30 INJECTION, SOLUTION INTRAMUSCULAR; INTRAVENOUS at 07:05

## 2025-05-08 RX ADMIN — BUPIVACAINE HYDROCHLORIDE 1.6 ML: 7.5 INJECTION, SOLUTION EPIDURAL; RETROBULBAR at 01:05

## 2025-05-08 RX ADMIN — Medication 500 ML: at 01:05

## 2025-05-08 RX ADMIN — ONDANSETRON 8 MG: 2 INJECTION INTRAMUSCULAR; INTRAVENOUS at 01:05

## 2025-05-08 RX ADMIN — Medication 300 MCG: at 01:05

## 2025-05-08 RX ADMIN — KETOROLAC TROMETHAMINE 30 MG: 30 INJECTION, SOLUTION INTRAMUSCULAR; INTRAVENOUS at 06:05

## 2025-05-08 RX ADMIN — SODIUM CHLORIDE, SODIUM LACTATE, POTASSIUM CHLORIDE, CALCIUM CHLORIDE: 20; 30; 600; 310 INJECTION, SOLUTION INTRAVENOUS at 01:05

## 2025-05-08 RX ADMIN — ACETAMINOPHEN 1000 MG: 10 INJECTION, SOLUTION INTRAVENOUS at 01:05

## 2025-05-08 RX ADMIN — OXYTOCIN 10 UNITS: 10 INJECTION, SOLUTION INTRAMUSCULAR; INTRAVENOUS at 01:05

## 2025-05-08 RX ADMIN — CEFAZOLIN 2 G: 330 INJECTION, POWDER, FOR SOLUTION INTRAMUSCULAR; INTRAVENOUS at 01:05

## 2025-05-08 RX ADMIN — DEXMEDETOMIDINE 30 MCG: 200 INJECTION, SOLUTION INTRAVENOUS at 01:05

## 2025-05-08 RX ADMIN — KETOROLAC TROMETHAMINE 30 MG: 30 INJECTION, SOLUTION INTRAMUSCULAR; INTRAVENOUS at 02:05

## 2025-05-08 RX ADMIN — AZITHROMYCIN MONOHYDRATE 500 MG: 500 INJECTION, POWDER, LYOPHILIZED, FOR SOLUTION INTRAVENOUS at 01:05

## 2025-05-08 RX ADMIN — DOCUSATE SODIUM 200 MG: 100 CAPSULE, LIQUID FILLED ORAL at 07:05

## 2025-05-08 RX ADMIN — DEXMEDETOMIDINE 20 MCG: 200 INJECTION, SOLUTION INTRAVENOUS at 02:05

## 2025-05-08 RX ADMIN — EPHEDRINE SULFATE 50 MG: 50 INJECTION INTRAVENOUS at 01:05

## 2025-05-08 RX ADMIN — Medication 200 MCG: at 01:05

## 2025-05-08 RX ADMIN — FENTANYL CITRATE 10 MCG: 50 INJECTION, SOLUTION INTRAMUSCULAR; INTRAVENOUS at 01:05

## 2025-05-08 RX ADMIN — DOCUSATE SODIUM 200 MG: 100 CAPSULE, LIQUID FILLED ORAL at 09:05

## 2025-05-08 RX ADMIN — MORPHINE SULFATE 0.1 MG: 1 INJECTION, SOLUTION EPIDURAL; INTRATHECAL; INTRAVENOUS at 01:05

## 2025-05-08 RX ADMIN — PRENATAL VITAMINS-IRON FUMARATE 27 MG IRON-FOLIC ACID 0.8 MG TABLET 1 TABLET: at 09:05

## 2025-05-08 NOTE — ED PROVIDER NOTES
DESHAWN NOTE     Admit Date: 2025  DESHAWN Physician: Gareth Steward  Primary OBGYN: No Attending/UNASSIGNED,    Chief Complaint   Patient presents with    Leaking fluid     IUP 33.3 mo/di twins, c/o gush of fluid around 2305, clear, no bleeding. Sees Dr Gibson and Dr Caro       [facility] Course:  Rocky Burch is a 34 y.o.  at 33w3d presents complaining of spontaneous rupture of membranes proximally 11:00 p.m..  Patient is not experiencing regular uterine contractions which are becoming more frequent and painful.    This IUP is complicated by known twin gestation.  Patient desires  section for delivery.    Patient denies vaginal bleeding, headache, vision changes, RUQ pain, dysuria, fever, and nausea/vomiting.  Fetal Movement: normal.      /75   Pulse 94   Temp 98.3 °F (36.8 °C)   Resp 18   LMP 2024 (Exact Date)   Temp:  [98.3 °F (36.8 °C)] 98.3 °F (36.8 °C)  Pulse:  [94] 94  Resp:  [18] 18  BP: (120-132)/(68-75) 132/75    General: in no respiratory distress and acyanotic  Cardiovascular: regular rate and rhythm no murmurs  Respiratory: clear to auscultation, no wheezes, rales or rhonchi, symmetric air entry  Abdominal: FHT present  Back: lumbar tenderness present CVA tenderness none  Extremeties no redness or tenderness in the calves or thighs    SSE:   SVE:  3-4 cm, 70%, minus 2, clear fluid noted, vertex presentation      FHT: Category 1  TOCO:  Irregular uterine contractions are noted 4-5 minutes apart    Medical Decision Making:  The patient has ruptured membranes and uterine contractions consistent with labor.  She has elected to proceed with  delivery.  Alternatives, risks, and complications were discussed with her.  Questions were answered to her satisfaction.  Operative consents have been signed.    LABS:     Recent Results (from the past 24 hours)   POCT Urinalysis, Dipstick, Automated, W/O Scope    Collection Time: 25  1:16 PM   Result Value Ref Range    POC  Blood, Urine Negative Negative    POC Bilirubin, Urine Negative Negative    POC Urobilinogen, Urine 0.2 0.1 - 1.1    POC Ketones, Urine Positive (A) Negative    POC Protein, Urine Negative Negative    POC Nitrates, Urine Negative Negative    POC Glucose, Urine Negative Negative    pH, UA 6.5     POC Specific Gravity, Urine 1.030 (A) 1.003 - 1.029    POC Leukocytes, Urine Negative Negative     [unfilled]     Imaging Results    None          ASSESMENT: Rocky Burch is a 34 y.o.   at 33w3d with twin gestation, ruptured membranes, uterine contractions consistent with labor.    Patient has elected to proceed with  section for delivery.    Discharge Diagnosis/Clinical Impression:  Pregnancy 33 weeks.  Uterine inertia.  Ruptured membranes.  Twin gestation.    Status:Stable    Disposition:  admitted to OB service    Medications:       Patient Instructions:   Current Discharge Medication List        CONTINUE these medications which have NOT CHANGED    Details   busPIRone (BUSPAR) 5 MG Tab Take 5 mg by mouth 2 (two) times daily as needed.      calcium carbonate (OS-DEMETRI) 500 mg calcium (1,250 mg) tablet Take 2 tablets (1,000 mg total) by mouth 2 (two) times daily.  Qty: 60 tablet, Refills: 5    Associated Diagnoses: Monochorionic diamniotic twin gestation in first trimester      docusate sodium (COLACE) 100 MG capsule Take 100 mg by mouth 2 (two) times daily.      ferrous sulfate 324 mg (65 mg iron) TbEC Take 325 mg by mouth once daily.      lancets 28 gauge Misc USE TO CHECK BLOOD SUGAR 5 TIMES A DAY AS DIRECTED BY THE PROVIDER      prenatal vit no.124/iron/folic (PRENATAL VITAMIN ORAL) Take 1 tablet by mouth once daily.      TRUE METRIX AIR GLUCOSE METER Misc 5 (five) times daily.      TRUE METRIX GLUCOSE TEST STRIP Strp 5 (five) times daily.      ZINC ORAL Take by mouth.               No discharge procedures on file.    Gareth Steward MD  OB-GYN Hospitalist       Gareth Steward MD  25 0040

## 2025-05-08 NOTE — PLAN OF CARE
Problem: Diabetes in Pregnancy  Goal: Optimal Coping  Outcome: Progressing  Goal: Blood Glucose Level Within Targeted Range  Outcome: Progressing     Problem:  Fall Injury Risk  Goal: Absence of Fall, Infant Drop and Related Injury  Outcome: Progressing     Problem: Infection  Goal: Absence of Infection Signs and Symptoms  Outcome: Progressing     Problem: Adult Inpatient Plan of Care  Goal: Plan of Care Review  Outcome: Progressing  Goal: Patient-Specific Goal (Individualized)  Outcome: Progressing  Goal: Absence of Hospital-Acquired Illness or Injury  Outcome: Progressing  Goal: Optimal Comfort and Wellbeing  Outcome: Progressing  Goal: Readiness for Transition of Care  Outcome: Progressing     Problem: Wound  Goal: Optimal Coping  Outcome: Progressing  Goal: Optimal Functional Ability  Outcome: Progressing  Goal: Absence of Infection Signs and Symptoms  Outcome: Progressing  Goal: Improved Oral Intake  Outcome: Progressing  Goal: Optimal Pain Control and Function  Outcome: Progressing  Goal: Skin Health and Integrity  Outcome: Progressing  Goal: Optimal Wound Healing  Outcome: Progressing     Problem: Breastfeeding  Goal: Effective Breastfeeding  Outcome: Progressing     Problem: Postpartum ( Delivery)  Goal: Successful Parent Role Transition  Outcome: Progressing  Goal: Hemostasis  Outcome: Progressing  Goal: Effective Bowel Elimination  Outcome: Progressing  Goal: Fluid and Electrolyte Balance  Outcome: Progressing  Goal: Absence of Infection Signs and Symptoms  Outcome: Progressing  Goal: Anesthesia/Sedation Recovery  Outcome: Progressing  Goal: Optimal Pain Control and Function  Outcome: Progressing  Goal: Nausea and Vomiting Relief  Outcome: Progressing  Goal: Effective Urinary Elimination  Outcome: Progressing  Goal: Effective Oxygenation and Ventilation  Outcome: Progressing     Problem: Pain Acute  Goal: Optimal Pain Control and Function  Outcome: Progressing

## 2025-05-08 NOTE — ANESTHESIA PROCEDURE NOTES
Spinal    Diagnosis: Repeat  Section  Patient location during procedure: OB  Start time: 2025 1:17 AM  Timeout: 2025 1:15 AM  End time: 2025 1:22 AM    Staffing  Authorizing Provider: Sylvain Scott MD  Performing Provider: Sabino Shahid CRNA    Staffing  Performed by: Sabino Shahid CRNA  Authorized by: Sylvain Scott MD    Preanesthetic Checklist  Completed: patient identified, IV checked, site marked, risks and benefits discussed, surgical consent, monitors and equipment checked, pre-op evaluation and timeout performed  Spinal Block  Patient position: sitting  Prep: ChloraPrep  Patient monitoring: heart rate, continuous pulse ox and frequent blood pressure checks  Approach: midline  Location: L2-3  Injection technique: single shot  CSF Fluid: clear free-flowing CSF  Needle  Needle type: pencil-tip   Needle gauge: 25 G  Needle length: 3.5 in  Additional Documentation: negative aspiration for heme, no paresthesia on injection and incremental injection  Needle localization: anatomical landmarks  Assessment  Sensory level: T4   Dermatomal levels determined by pinch or prick  Ease of block: easy  Patient's tolerance of the procedure: comfortable throughout block and no complaints  Medications:    Medications: bupivacaine (pf) (MARCAINE) injection 0.75% - Intrathecal   1.6 mL - 2025 1:22:00 AM

## 2025-05-08 NOTE — ANESTHESIA POSTPROCEDURE EVALUATION
Anesthesia Post Evaluation    Patient: Rocky Burch    Procedure(s) Performed: Procedure(s) (LRB):   SECTION (N/A)    Final Anesthesia Type: regional (Regional comprises either epidural or spinal, and conversion to General if required)      Patient location during evaluation: labor & delivery  Patient participation: Yes- Able to Participate  Post-procedure mental status: @ baseline.  Post-procedure vital signs: reviewed and stable  Pain management: adequate  AQI66 TONIA Mitigation: See pacu RN note for any measures applied.  PONV status: See postop meds for drugs used to control n/v if any.  Anesthetic complications: no      Cardiovascular status: stable  Respiratory status: @ baseline.            No complaints at this time.      Vitals Value Taken Time   /63 25 08:12   Temp 36.8 °C (98.3 °F) 25 08:12   Pulse 81 25 08:12   Resp 16 25 03:30   SpO2 96 % 25 08:12         Event Time   Out of Recovery 03:30:00         Pain/Polo Score: Pain Rating Prior to Med Admin: 6 (2025  2:33 PM)  Polo Score: 10 (2025  3:30 AM)

## 2025-05-08 NOTE — H&P
HISTORY AND PHYSICAL     Admit Date: 2025  DESHAWN Physician: Gareth Steward  Primary OBGYN: No Attending/UNASSIGNED,    Chief Complaint   Patient presents with    Leaking fluid     IUP 33.3 mo/di twins, c/o gush of fluid around 2305, clear, no bleeding. Sees Dr Gibson and Dr Caro       [facility] Course:  Rocky Burch is a 34 y.o.  at 33w3d presents complaining of spontaneous rupture of membranes proximally 11:00 p.m..  Patient is not experiencing regular uterine contractions which are becoming more frequent and painful.    This IUP is complicated by known twin gestation.  Patient desires  section for delivery.    Patient denies vaginal bleeding, headache, vision changes, RUQ pain, dysuria, fever, and nausea/vomiting.  Fetal Movement: normal.      /75   Pulse 94   Temp 98.3 °F (36.8 °C)   Resp 18   LMP 2024 (Exact Date)   Temp:  [98.3 °F (36.8 °C)] 98.3 °F (36.8 °C)  Pulse:  [94] 94  Resp:  [18] 18  BP: (120-132)/(68-75) 132/75    General: in no respiratory distress and acyanotic  Cardiovascular: regular rate and rhythm no murmurs  Respiratory: clear to auscultation, no wheezes, rales or rhonchi, symmetric air entry  Abdominal: FHT present  Back: lumbar tenderness present CVA tenderness none  Extremeties no redness or tenderness in the calves or thighs    SSE:   SVE:  3-4 cm, 70%, minus 2, clear fluid noted, vertex presentation      FHT: Category 1  TOCO:  Irregular uterine contractions are noted 4-5 minutes apart    Medical Decision Making:  The patient has ruptured membranes and uterine contractions consistent with labor.  She has elected to proceed with  delivery.  Alternatives, risks, and complications were discussed with her.  Questions were answered to her satisfaction.  Operative consents have been signed.    LABS:     Recent Results (from the past 24 hours)   POCT Urinalysis, Dipstick, Automated, W/O Scope    Collection Time: 25  1:16 PM   Result Value Ref  Range    POC Blood, Urine Negative Negative    POC Bilirubin, Urine Negative Negative    POC Urobilinogen, Urine 0.2 0.1 - 1.1    POC Ketones, Urine Positive (A) Negative    POC Protein, Urine Negative Negative    POC Nitrates, Urine Negative Negative    POC Glucose, Urine Negative Negative    pH, UA 6.5     POC Specific Gravity, Urine 1.030 (A) 1.003 - 1.029    POC Leukocytes, Urine Negative Negative     [unfilled]     Imaging Results    None          ASSESMENT: Rocky Burch is a 34 y.o.   at 33w3d with twin gestation, ruptured membranes, uterine contractions consistent with labor.    Patient has elected to proceed with  section for delivery.    Discharge Diagnosis/Clinical Impression:  Pregnancy 33 weeks.  Uterine inertia.  Ruptured membranes.  Twin gestation.    Status:Stable    Disposition:  admitted to OB service    Medications:       Patient Instructions:   Current Discharge Medication List        CONTINUE these medications which have NOT CHANGED    Details   busPIRone (BUSPAR) 5 MG Tab Take 5 mg by mouth 2 (two) times daily as needed.      calcium carbonate (OS-DEMETRI) 500 mg calcium (1,250 mg) tablet Take 2 tablets (1,000 mg total) by mouth 2 (two) times daily.  Qty: 60 tablet, Refills: 5    Associated Diagnoses: Monochorionic diamniotic twin gestation in first trimester      docusate sodium (COLACE) 100 MG capsule Take 100 mg by mouth 2 (two) times daily.      ferrous sulfate 324 mg (65 mg iron) TbEC Take 325 mg by mouth once daily.      lancets 28 gauge Misc USE TO CHECK BLOOD SUGAR 5 TIMES A DAY AS DIRECTED BY THE PROVIDER      prenatal vit no.124/iron/folic (PRENATAL VITAMIN ORAL) Take 1 tablet by mouth once daily.      TRUE METRIX AIR GLUCOSE METER Misc 5 (five) times daily.      TRUE METRIX GLUCOSE TEST STRIP Strp 5 (five) times daily.      ZINC ORAL Take by mouth.               No discharge procedures on file.    Gareth Steward MD  OB-GYN Hospitalist       Gareth Steward MD  25  0043

## 2025-05-08 NOTE — PROGRESS NOTES
Post  Delivery Progress Note:     Subjective  Rocky Burch is a 34 y.o.  s/p 1LTCS in setting PPROM with MCDA twins @ 33wga  on 2025, POD # 1. Doing well postoperatively.    The patient had no complains overnight and feels well this morning. Pain is well controlled on current meds. S/p lozano. Ambulating excellently around the halls and room. Patient in great spirits and upbeat.  Flatus has passed. Tolerating a regular diet. Minimal lochia.    Denies fevers, chills, headache, blurry vision, nausea, vomiting, dizziness, or syncope.   Denies chest pain, shortness of breath, RUQ pain, or calf pain.     Objective:  Vital signs in last 24 hours:     Vitals:    25 0449 25 0557 25 0657 25 0812   BP: 126/69 116/65 112/62 107/63   Pulse: 74 77 94 81   Resp:       Temp:    98.3 °F (36.8 °C)   TempSrc:    Oral   SpO2:    96%     Physical Exam:  General: Alert and oriented, in no acute distress   Lungs: Clear to auscultation bilaterally   Heart:: Regular rate and rhythm, S1, S2 normal, no murmur   Abdomen Soft, non-distended   Bowel Sounds: Active   Uterine Fundus:  Firm, below the umbilicus   Incision: incision clean, dry, intact   Lochia Appropriate   DVT Evaluation: No cords or calf tenderness.  No significant calf/ankle edema.   Extremities: Normal, atraumatic, non-edematous, SCDs in place     Labs  Trended Lab Data:  Recent Labs   Lab 25  0046 25  0514   WBC 12.14* 15.01*   HGB 12.2 11.0*   HCT 37.5 33.6*    119*   MCV 89.7 89.8   RDW 15.2 15.0       Medications      docusate sodium  200 mg Oral BID    ketorolac  30 mg Intravenous Q6H    lactated ringers  1,000 mL Intravenous Once    mupirocin   Nasal BID    prenatal vitamin  1 tablet Oral Daily    sodium citrate-citric acid 500-334 mg/5 ml  30 mL Oral Once         lactated ringers   Intravenous Continuous        oxytocin  95 don-units/min Intravenous Continuous PRN            Current Facility-Administered  Medications:     bisacodyL, 10 mg, Rectal, Once PRN    carboprost, 250 mcg, Intramuscular, Q15 Min PRN    diphenhydrAMINE, 25 mg, Oral, Q4H PRN    diphenoxylate-atropine 2.5-0.025 mg, 2 tablet, Oral, Q6H PRN    famotidine (PF), 20 mg, Intravenous, On Call Procedure    lactated ringers, 1,000 mL, Intravenous, PRN    lanolin, , Topical (Top), PRN    [START ON 2025] magnesium hydroxide 400 mg/5 ml, 30 mL, Oral, BID PRN    measles, mumps and rubella vaccine, 0.5 mL, Subcutaneous, vaccine x 1 dose    methylergonovine, 200 mcg, Intramuscular, On Call Procedure    methylergonovine, 200 mcg, Intramuscular, Once PRN    miSOPROStoL, 800 mcg, Rectal, On Call Procedure    miSOPROStoL, 800 mcg, Rectal, Once PRN    miSOPROStoL, 800 mcg, Oral, Once PRN    morphine, 2 mg, Intravenous, Q3H PRN    morphine, 4 mg, Intravenous, Q4H PRN    mupirocin, , Nasal, On Call Procedure    nalbuphine, 2.5 mg, Intravenous, Once PRN    naloxone, 0.04 mg, Intravenous, PRN    ondansetron, 4 mg, Intravenous, Q12H PRN    oxytocin, 95 don-units/min, Intravenous, Continuous PRN    oxytocin, 95 don-units/min, Intravenous, Once PRN    oxytocin, 10 Units, Intravenous, Once PRN    oxytocin, 10 Units, Intramuscular, Once PRN    prochlorperazine, 5 mg, Intravenous, Q6H PRN    rho(D) immune globulin, 300 mcg, Intravenous, vaccine x 1 dose    senna-docusate, 1 tablet, Oral, Nightly PRN    simethicone, 1 tablet, Oral, Q6H PRN    sodium chloride 0.9%, 10 mL, Intravenous, PRN    sodium citrate-citric acid 500-334 mg/5 ml, 30 mL, Oral, On Call Procedure    DIPH,PERTUSS(ACELL),TET VACCINE (ADULT)(BOOSTRIX,ADACEL), 0.5 mL, Intramuscular, vaccine x 1 dose    tranexamic acid (CYKLOKAPRON) infusion, 1,000 mg, Intravenous, Q30 Min PRN    Assessment/Plan  Rocky Burch is a 34 y.o.  s/p 1LTCS in setting PPROM with MCDA twins on 2025, POD # 1. Clinically stable.    Doing well. Continue routine postpartum/postop care. Advance today.  Encourage  ambulation.  Antepartum H/H 12.2/37.5 --> QBL 860mL --> postpartum H/H *pending, ordered* .   Asymptomatic. Not tachycardic.  Continue to monitor for symptoms of anemia.  Rh negative, s/p rhogam   GDMA1, 2hr OGTT 6 weeks postpartum   Breastfeeding, continue to encourage.  Pain: Controlled with current pain regimen.  PPBCM: Undecided  Dispo: continue to monitor and anticipate discharge on POD 3-4 if meeting all discharge criteria.    Patient and plan discussed with staff.    Tiana Bright MD   PGY2, Obstetrics & Gynecology   [facility]

## 2025-05-08 NOTE — L&D DELIVERY NOTE
[facility] - 2nd Floor Mother/Baby Unit   Section   Operative Note    SUMMARY     Date of Procedure: 2025     Procedure: Procedure(s) (LRB):   SECTION (N/A)    Surgeons and Role:     * Gareth Steward MD - Primary    Assisting Surgeon: None    Pre-Operative Diagnosis:  premature rupture of membranes (PPROM) with unknown onset of labor [O42.919] mono chorionic diamniotic twin gestation.    Post-Operative Diagnosis: Post-Op Diagnosis Codes:     *  premature rupture of membranes (PPROM) with unknown onset of labor [O42.919] same    Anesthesia: Spinal/Epidural    Technical Procedures Used:            Description of the Findings of the Procedure:  The patient is a 34-year-old  3 para 1 at 33.3 weeks estimated gestational age who presented to [facility] with complaints of spontaneous rupture of membranes.  Patient was followed by Maternal-Fetal Medicine for twin gestation and was known to be in the vertex breech presentation.  Patient had already discussed and decided upon  section as means of delivery.    Significant Surgical Tasks Conducted by the Assistant(s), if Applicable:  None    Complications: No    Blood Loss: * No values recorded between 2025  1:13 AM and 2025  2:26 AM * 600 cc     Procedure Details   The patient was seen in the OB Emergency Department Room. The risks, benefits, complications, treatment options, and expected outcomes were discussed with the patient.  The patient concurred with the proposed plan, giving informed consent.  The site of surgery properly noted/marked. The patient was taken to Operating Room, identified as Rocky Burch and the procedure verified as primary  Delivery. A Time Out was held and the above information confirmed.    After induction of anesthesia, the patient was prepped and draped in the usual sterile manner while placed in a dorsal supine position with a left lateral tilt.  A lozano  catheter was also placed per nursing. Preoperative antibiotics per above were administered and a test was performed yielding adequate anesthesia.  A Pfannenstiel incision was made and carried down through the subcutaneous tissue to the fascia. Fascial incision was made and extended transversely. The fascia was grasped with [facility] clamps and  from the underlying rectus tissue superiorly and inferiorly. The peritoneum was identified, found to be free of adherent bowl and entered without complication. The vesico-uterine peritoneum was incised transversely and the bladder flap was bluntly freed from the lower uterine segment. The bladder blade was reinserted to keep the bladder out of the operative field. A low transverse uterine incision was made with knife and extended bilaterly. The amniotic sac was ruptured similarly and the infant was noted to be in vertex position. The patient delivered a viable male infant without difficulty .   After the umbilical cord was clamped and cut the infant was handed to the pediatric team in attendance.  The 2nd twin was also delivered from the vertex presentation without difficulty.  This was a male infant.  After the cord was clamped and cut the infant was handed to the pediatric team in attendance.  Samples of  cord blood was obtained for evaluation. The placenta was removed intact and appeared normal. The uterus was exteriorized. The uterine outline, tubes and ovaries appeared normal. The uterine incision was closed with running locked sutures of 1. Chromic.  The incision was then imbricated with a Lembert stitch of 1. Chromic.. Hemostasis was observed. The uterus was returned to the abdominal cavity. Incision was reinspected and good hemostasis was noted. The abdominal cavity was irrigated to remove clots.  Adequate hemostasis noted the fascia was reapproximated with 0 Vicryl in running locking fashion.  The subcutaneous area was irrigated and closed with 3-0 plain.  The  "skin was closed with a subcutaneous stitch of 4-0 Vicryl.      Instrument, sponge, and needle counts were correct prior the abdominal closure and at the conclusion of the case. Murphy draining clear yellow urine.     Patient tolerated procedure well and was stable and in good condition after the procedure.                     Specimens:   Specimen (24h ago, onward)       Start     Ordered    25  Specimen to Pathology Gynecology and Obstetrics  Once        References:    Click here for ordering Quick Tip   Question Answer Comment   Service Line: Gynecology and Obstetrics    Specimen Source Placenta    Specimen Class: Routine/Screening    Procedure Type: OB - Delivery    Clinical Information: 33 weesk adn 3 days PPROM twin delivery    Release to patient Immediate        25                    Condition: Stable    VTE Risk Mitigation (From admission, onward)           Ordered     Place sequential compression device  Until discontinued         25     IP VTE HIGH RISK PATIENT  Once         25     Place sequential compression device  Until discontinued         25                    Disposition: PACU - hemodynamically stable.    Attestation: JANKI Chinchilla [[id number]]   Delivery Information for A Baljinder Burch    Birth information:  YOB: 2023   Time of birth: 1:43 AM   Sex: male   Head Delivery Date/Time: 2025  1:43 AM   Delivery type: , Low Transverse   Gestational Age: 33w3d       Delivery Providers    Delivering clinician: Gareth Steward MD   Provider Role    Nneka Wilkerson RN Registered Nurse    Honey Valente RN Registered Nurse              Measurements    Weight: 2150 g  Weight (lbs): 4 lb 11.8 oz  Length: 44.5 cm  Length (in): 17.52"  Head circumference: 31.5 cm  Abdominal girth: 22.5 cm         Apgars    Living status: Living  Apgar Component Scores:  1 min.:  5 min.:  10 min.:  15 min.:  20 min.:    Skin " color:  0  1       Heart rate:  2  2       Reflex irritability:  2  2       Muscle tone:  2  2       Respiratory effort:  2  2       Total:  8  9                Operative Delivery    Forceps attempted?: No  Vacuum extractor attempted?: No         Shoulder Dystocia    Shoulder dystocia present?: No           Presentation    Presentation: Vertex  Position: Middle           Interventions/Resuscitation    Method: Tactile Stimulation       Cord    Vessels: 3 vessels  Complications: None  Cord Blood Disposition: Sent with Baby       Placenta    Placenta delivery date/time: 2025 01:45  Placenta removal: Manual removal  Placenta appearance: Intact  Placenta disposition: Discarded           Labor Events:       labor:       Labor Onset Date/Time:         Dilation Complete Date/Time:         Start Pushing Date/Time:       Rupture Date/Time: 25        Rupture type: PPROM (Premature Prolonged Rupture)        Fluid Amount:       Fluid Color: Yellow      steroids:       Antibiotics given for GBS:       Induction:       Indications for induction:        Augmentation:       Indications for augmentation:       Labor complications:       Additional complications:          Cervical ripening:                     Delivery:      Episiotomy:       Indication for Episiotomy:       Perineal Lacerations:   Repaired:      Periurethral Laceration:   Repaired:     Labial Laceration:   Repaired:     Sulcus Laceration:   Repaired:     Vaginal Laceration:   Repaired:     Cervical Laceration:   Repaired:     Repair suture:       Repair # of packets:       Last Value - EBL - Nursing (mL):       Sum - EBL - Nursing (mL): 0     Last Value - EBL - Anesthesia (mL):      Calculated QBL (mL): 860     Running total QBL (mL): 860     Vaginal Sweep Performed:       Surgicount Correct:         Other providers:       Anesthesia    Method: Spinal          Details (if applicable):  Trial of Labor No     Categorization: Primary    Priority: Routine   Indications for : Multiple Gestation   Incision Type: low transverse     Additional  information:  Forceps:    Vacuum:    Breech:    Observed anomalies    Other (Comments):            GABY Burch [[id number]]   Delivery Information for GABY Burch    Birth information:  YOB: 2023   Time of birth: 1:45 AM   Sex: male   Head Delivery Date/Time: 2025  1:45 AM   Delivery type: , Low Transverse   Gestational Age: 33w3d       Delivery Providers    Delivering clinician: Gareth Steward MD   Provider Role    Nneka Wilkerson RN Registered Nurse    Honey Valente RN Registered Nurse              Measurements    Weight:  g  Weight (lbs): 4 lb 7 oz  Length:          Apgars    Living status: Living  Apgar Component Scores:  1 min.:  5 min.:  10 min.:  15 min.:  20 min.:    Skin color:  0  1       Heart rate:  2  2       Reflex irritability:  2  2       Muscle tone:  2  2       Respiratory effort:  2  2       Total:  8  9                Operative Delivery    Forceps attempted?: No  Vacuum extractor attempted?: No         Shoulder Dystocia    Shoulder dystocia present?: No           Presentation    Presentation: Vertex  Position: Middle           Interventions/Resuscitation    Method: Tactile Stimulation       Cord    Vessels: 3 vessels  Complications: None  Cord Blood Disposition: Sent with Baby       Placenta    Placenta delivery date/time: 2025 01:45  Placenta removal: Manual removal  Placenta appearance: Intact  Placenta disposition: Discarded           Labor Events:       labor:       Labor Onset Date/Time:         Dilation Complete Date/Time:         Start Pushing Date/Time:       Rupture Date/Time: 25 2305        Rupture type: PPROM (Premature Prolonged Rupture)        Fluid Amount:       Fluid Color: Yellow      steroids:       Antibiotics given for GBS:       Induction:       Indications for  induction:        Augmentation:       Indications for augmentation:       Labor complications:       Additional complications:          Cervical ripening:                     Delivery:      Episiotomy:       Indication for Episiotomy:       Perineal Lacerations:   Repaired:      Periurethral Laceration:   Repaired:     Labial Laceration:   Repaired:     Sulcus Laceration:   Repaired:     Vaginal Laceration:   Repaired:     Cervical Laceration:   Repaired:     Repair suture:       Repair # of packets:       Last Value - EBL - Nursing (mL):       Sum - EBL - Nursing (mL): 0     Last Value - EBL - Anesthesia (mL):      Calculated QBL (mL): 860     Running total QBL (mL): 860     Vaginal Sweep Performed:       Surgicount Correct:         Other providers:       Anesthesia    Method: Spinal          Details (if applicable):  Trial of Labor No    Categorization: Primary    Priority: Routine   Indications for : Multiple Gestation   Incision Type: low transverse     Additional  information:  Forceps:    Vacuum:    Breech:    Observed anomalies    Other (Comments):

## 2025-05-08 NOTE — ANESTHESIA PREPROCEDURE EVALUATION
[facility]  Anesthesiology   Section    Patient Name: Rocky Burch  MRN: [id number]  Admission Date: 2025  Primary Care Provider: Aga, Primary Doctor  Attending Physician: Gareth Steward MD    Subjective:   In labor, twins, breech presentation.    OB History    Para Term  AB Living   3 1 0 1 1 0   SAB IAB Ectopic Multiple Live Births   1 0 0 0 0      # Outcome Date GA Lbr José Miguel/2nd Weight Sex Type Anes PTL Lv   3 Current            2 SAB 23     SAB         Birth Comments: molar pregnancy   1  23 33w2d 02:25 / 00:18 0.959 kg (2 lb 1.8 oz) F Vag-Spont None  FD      Complications: Trisomy 13, unspecified      Name: SIMONE BURCH FD      Apgar1: 0  Apgar5: 0     Past Medical History:   Diagnosis Date    Anxiety disorder, unspecified     Constipation     Fetal demise, greater than 22 weeks, antepartum, fetus 1 2023    Maternal care for (suspected) chromosomal abnormality in fetus, trisomy 18, not applicable or unspecified 2023    Molar pregnancy     Rh negative state in antepartum period, third trimester 2023    Stillborn, abnormal      Past Surgical History:   Procedure Laterality Date    DILATION AND CURETTAGE OF UTERUS USING SUCTION N/A 2023    Procedure: DILATION AND CURETTAGE, UTERUS, USING SUCTION;  Surgeon: Fidel Gibson MD;  Location: [facility];  Service: OB/GYN;  Laterality: N/A;    WISDOM TOOTH EXTRACTION         (Not in a hospital admission)      Review of patient's allergies indicates:  No Known Allergies     Tobacco Use    Smoking status: Never     Passive exposure: Never    Smokeless tobacco: Never   Substance and Sexual Activity    Alcohol use: Not Currently     Comment: social    Drug use: Never    Sexual activity: Yes     Partners: Male     Birth control/protection: None     Review of Systems   Endocrine:        Gestational DM   Psychiatric/Behavioral:          Anxiety      Objective:     Vital  Signs (Most Recent):  Temp: 36.8 °C (98.3 °F) (25)  Pulse: 94 (25 002)  Resp: 18 (25)  BP: 132/75 (25 002) Vital Signs (24h Range):  Temp:  [36.8 °C (98.3 °F)] 36.8 °C (98.3 °F)  Pulse:  [94] 94  Resp:  [18] 18  BP: (120-132)/(68-75) 132/75        There is no height or weight on file to calculate BMI.    Significant Labs:  Lab Results   Component Value Date    WBC 12.88 (H) 2025    HGB 11.2 (L) 2025    HCT 32.9 (L) 2025    MCV 87.5 2025     2025       Assessment/Plan:     34 y.o. female  at 33w3d for:  section    Sabino Shahid CRNA  Anesthesiology  [facility] - Emergency Dept (OB)          Pre-op Assessment    I have reviewed the Patient Summary Reports.     I have reviewed the Nursing Notes. I have reviewed the NPO Status.   I have reviewed the Medications.     Review of Systems  Anesthesia Hx:             Denies Family Hx of Anesthesia complications.    Denies Personal Hx of Anesthesia complications.                    Hematology/Oncology:  Hematology Normal   Oncology Normal                                   EENT/Dental:  EENT/Dental Normal           Cardiovascular:  Cardiovascular Normal                                              Pulmonary:  Pulmonary Normal                       Renal/:  Renal/ Normal                 Hepatic/GI:  Hepatic/GI Normal                    Musculoskeletal:  Musculoskeletal Normal                Neurological:  Neurology Normal                                      Endocrine:  Endocrine Normal            Dermatological:  Skin Normal    Psych:  Psychiatric History anxiety                 Physical Exam  General: Well nourished, Cooperative, Oriented and Alert    Airway:  Mallampati: II   Mouth Opening: Normal  TM Distance: Normal  Tongue: Normal  Neck ROM: Normal ROM    Dental:  Intact    Chest/Lungs:  Clear to auscultation    Heart:  Rhythm: Regular Rhythm        Anesthesia  Plan  Type of Anesthesia, risks & benefits discussed:    Anesthesia Type: Spinal  Intra-op Monitoring Plan: Standard ASA Monitors  Post Op Pain Control Plan: multimodal analgesia, IV/PO Opioids PRN and intrathecal opioid  Informed Consent: Informed consent signed with the Patient and all parties understand the risks and agree with anesthesia plan.  All questions answered.   ASA Score: 2  Day of Surgery Review of History & Physical: H&P Update referred to the surgeon/provider.    Ready For Surgery From Anesthesia Perspective.     .

## 2025-05-08 NOTE — TRANSFER OF CARE
Anesthesia Transfer of Care Note    Patient: Rocky Burch    Procedure(s) Performed: Procedure(s) (LRB):   SECTION (N/A)    Patient location: Labor and Delivery    Anesthesia Type: spinal    Transport from OR: Transported from OR on room air with adequate spontaneous ventilation    Post pain: adequate analgesia    Post assessment: no apparent anesthetic complications    Post vital signs: stable    Level of consciousness: awake, alert and oriented    Nausea/Vomiting: no nausea/vomiting    Complications: none    Transfer of care protocol was followed      Last vitals: Visit Vitals  /75   Pulse 94   Temp 36.8 °C (98.3 °F)   Resp 18   LMP 2024 (Exact Date)

## 2025-05-09 LAB — RH IMMUNE GLOBULIN: NORMAL

## 2025-05-09 PROCEDURE — 25000003 PHARM REV CODE 250: Performed by: SPECIALIST

## 2025-05-09 PROCEDURE — 25000003 PHARM REV CODE 250: Performed by: STUDENT IN AN ORGANIZED HEALTH CARE EDUCATION/TRAINING PROGRAM

## 2025-05-09 PROCEDURE — 25000003 PHARM REV CODE 250: Performed by: OBSTETRICS & GYNECOLOGY

## 2025-05-09 PROCEDURE — 63600519 RHOGAM PHARM REV CODE 636 ALT 250 W HCPCS: Performed by: SPECIALIST

## 2025-05-09 PROCEDURE — 11000001 HC ACUTE MED/SURG PRIVATE ROOM

## 2025-05-09 PROCEDURE — 3E0334Z INTRODUCTION OF SERUM, TOXOID AND VACCINE INTO PERIPHERAL VEIN, PERCUTANEOUS APPROACH: ICD-10-PCS | Performed by: SPECIALIST

## 2025-05-09 RX ORDER — OXYCODONE AND ACETAMINOPHEN 5; 325 MG/1; MG/1
1 TABLET ORAL EVERY 4 HOURS PRN
Refills: 0 | Status: DISCONTINUED | OUTPATIENT
Start: 2025-05-09 | End: 2025-05-12 | Stop reason: HOSPADM

## 2025-05-09 RX ORDER — ONDANSETRON 4 MG/1
8 TABLET, ORALLY DISINTEGRATING ORAL EVERY 8 HOURS PRN
Status: DISCONTINUED | OUTPATIENT
Start: 2025-05-09 | End: 2025-05-12 | Stop reason: HOSPADM

## 2025-05-09 RX ORDER — IBUPROFEN 600 MG/1
600 TABLET, FILM COATED ORAL EVERY 6 HOURS PRN
Status: DISCONTINUED | OUTPATIENT
Start: 2025-05-09 | End: 2025-05-12 | Stop reason: HOSPADM

## 2025-05-09 RX ADMIN — HUMAN RHO(D) IMMUNE GLOBULIN 300 MCG: 1500 SOLUTION INTRAMUSCULAR; INTRAVENOUS at 05:05

## 2025-05-09 RX ADMIN — ONDANSETRON 8 MG: 4 TABLET, ORALLY DISINTEGRATING ORAL at 08:05

## 2025-05-09 RX ADMIN — IBUPROFEN 600 MG: 600 TABLET ORAL at 12:05

## 2025-05-09 RX ADMIN — DOCUSATE SODIUM 200 MG: 100 CAPSULE, LIQUID FILLED ORAL at 08:05

## 2025-05-09 RX ADMIN — DOCUSATE SODIUM 200 MG: 100 CAPSULE, LIQUID FILLED ORAL at 09:05

## 2025-05-09 RX ADMIN — IBUPROFEN 600 MG: 600 TABLET ORAL at 05:05

## 2025-05-09 RX ADMIN — OXYCODONE HYDROCHLORIDE AND ACETAMINOPHEN 1 TABLET: 5; 325 TABLET ORAL at 04:05

## 2025-05-09 RX ADMIN — OXYCODONE HYDROCHLORIDE AND ACETAMINOPHEN 1 TABLET: 5; 325 TABLET ORAL at 08:05

## 2025-05-09 RX ADMIN — PRENATAL VITAMINS-IRON FUMARATE 27 MG IRON-FOLIC ACID 0.8 MG TABLET 1 TABLET: at 09:05

## 2025-05-09 NOTE — PROGRESS NOTES
Copied from babies' charts:    .. Admit Assessment    Patient Identification  A & B Boy Rocky Burch   :  2023  Admit Date:  2025  Attending Provider:  Dorita Gipson MD              Referral:   Pt was admitted to NICU with a diagnosis of Prematurity, 2,000-2,499 grams, 33-34 completed weeks, and was admitted this hospital stay due to Premature infant of 33 weeks gestation [P07.36].   is involved and patient was referred to the Social Work Department via Routine NICU consult.        Verified her face sheet information:      Living Situation:      Resides at [address] [place] [address]  [place] [address] [place] [address], phone: [phone] (home).        Met with mother and FOB/ in postpartum room to complete new NICU admit social assessment, both parents were interactive and appropriate. Baby A's Name: Wilbert Burch; Baby B's name: David Morgan. FOB/: Nicholas Burch (involved). Parents report to living together at above confirmed address, this is their first children. Provided parents with [facility] contact info along with parent resource packet.       OB: Dr Paige Kim: undecided at current     Confirmed Supplies: confirmed to having a carseat and safe sleep for each twin     History/Current Symptoms of Anxiety/Depression: mother reports hx of anxiety and depression, prescribed Buspar PRN, [facility]   Discussed PPD and identifying symptoms and provided mom with PPD counseling resources and symptom brochure.       Identified Support:  reports sufficient support system     History/Current Substance Use:  no indications     Indications of Abuse/Neglect:   no indications         Emotional/Behavioral/Cognitive Issues: none observed at time of assessment      Current RX Prescriptions: buspar      Adequate Discharge/Visiting Transportation: yes     ROSA Signed/Filed: yes     Qualifies:   Early steps: yes  SSI: o     NICU Assessment  completed in Flowsheets: yes            Plan: will follow family throughout baby's stay in NICU for any needs      25 1109   NICU Assessment   Assessment Type Discharge Planning Assessment   Source of Information family   Verified Demographic and Insurance Information Yes   Insurance Commercial   Commercial BCBS OOS   Lives With mother;father   Name(s) of People in Home Nicholas Naranjo   Number people in home 4 including  twins   Relationship Status of Parents    Primary Source of Support/Comfort parent   Other children (include names and ages) twin brother   Father's Involvement Fully Involved   Is Father signing the birth certificate Yes   Father Name and  Nicholas Burch   Family Involvement Moderate   Primary Contact Name and Number Rocky Burch [phone]   Other Contacts Names and Numbers Nicholas Burch [phone]   Infant Feeding Plan breastfeeding   Does baby have crib or safe sleep space? Yes   Do you have a car seat? Yes   Resource/Environmental Concerns none   Environment Concerns none   Resources/Education Provided Preparing for Your Baby's Discharge Home;Support Resources for NICU Families;Early Intervention Program;Post Partum Depression   DME Needed Upon Discharge  none   DCFS No indications (Indicators for Report)   Discharge Plan A Home with family

## 2025-05-10 LAB
BASOPHILS # BLD AUTO: 0.08 X10(3)/MCL
BASOPHILS NFR BLD AUTO: 0.6 %
EOSINOPHIL # BLD AUTO: 0.45 X10(3)/MCL (ref 0–0.9)
EOSINOPHIL NFR BLD AUTO: 3.6 %
ERYTHROCYTE [DISTWIDTH] IN BLOOD BY AUTOMATED COUNT: 15.2 % (ref 11.5–17)
HCT VFR BLD AUTO: 36.8 % (ref 37–47)
HGB BLD-MCNC: 11.6 G/DL (ref 12–16)
IMM GRANULOCYTES # BLD AUTO: 0.29 X10(3)/MCL (ref 0–0.04)
IMM GRANULOCYTES NFR BLD AUTO: 2.3 %
LYMPHOCYTES # BLD AUTO: 2.65 X10(3)/MCL (ref 0.6–4.6)
LYMPHOCYTES NFR BLD AUTO: 21.1 %
MCH RBC QN AUTO: 29.2 PG (ref 27–31)
MCHC RBC AUTO-ENTMCNC: 31.5 G/DL (ref 33–36)
MCV RBC AUTO: 92.7 FL (ref 80–94)
MONOCYTES # BLD AUTO: 1.11 X10(3)/MCL (ref 0.1–1.3)
MONOCYTES NFR BLD AUTO: 8.8 %
NEUTROPHILS # BLD AUTO: 8 X10(3)/MCL (ref 2.1–9.2)
NEUTROPHILS NFR BLD AUTO: 63.6 %
NRBC BLD AUTO-RTO: 0 %
PLATELET # BLD AUTO: 182 X10(3)/MCL (ref 130–400)
PMV BLD AUTO: 11.1 FL (ref 7.4–10.4)
RBC # BLD AUTO: 3.97 X10(6)/MCL (ref 4.2–5.4)
WBC # BLD AUTO: 12.58 X10(3)/MCL (ref 4.5–11.5)

## 2025-05-10 PROCEDURE — 25000003 PHARM REV CODE 250: Performed by: SPECIALIST

## 2025-05-10 PROCEDURE — 85025 COMPLETE CBC W/AUTO DIFF WBC: CPT

## 2025-05-10 PROCEDURE — 25000003 PHARM REV CODE 250: Performed by: STUDENT IN AN ORGANIZED HEALTH CARE EDUCATION/TRAINING PROGRAM

## 2025-05-10 PROCEDURE — 11000001 HC ACUTE MED/SURG PRIVATE ROOM

## 2025-05-10 PROCEDURE — 36415 COLL VENOUS BLD VENIPUNCTURE: CPT

## 2025-05-10 RX ADMIN — SIMETHICONE 80 MG: 80 TABLET, CHEWABLE ORAL at 04:05

## 2025-05-10 RX ADMIN — SIMETHICONE 80 MG: 80 TABLET, CHEWABLE ORAL at 06:05

## 2025-05-10 RX ADMIN — IBUPROFEN 600 MG: 600 TABLET ORAL at 12:05

## 2025-05-10 RX ADMIN — OXYCODONE HYDROCHLORIDE AND ACETAMINOPHEN 1 TABLET: 5; 325 TABLET ORAL at 03:05

## 2025-05-10 RX ADMIN — OXYCODONE HYDROCHLORIDE AND ACETAMINOPHEN 1 TABLET: 5; 325 TABLET ORAL at 06:05

## 2025-05-10 RX ADMIN — OXYCODONE HYDROCHLORIDE AND ACETAMINOPHEN 1 TABLET: 5; 325 TABLET ORAL at 10:05

## 2025-05-10 RX ADMIN — IBUPROFEN 600 MG: 600 TABLET ORAL at 08:05

## 2025-05-10 RX ADMIN — DOCUSATE SODIUM 200 MG: 100 CAPSULE, LIQUID FILLED ORAL at 06:05

## 2025-05-10 RX ADMIN — DOCUSATE SODIUM 200 MG: 100 CAPSULE, LIQUID FILLED ORAL at 07:05

## 2025-05-10 RX ADMIN — OXYCODONE HYDROCHLORIDE AND ACETAMINOPHEN 1 TABLET: 5; 325 TABLET ORAL at 12:05

## 2025-05-10 RX ADMIN — OXYCODONE HYDROCHLORIDE AND ACETAMINOPHEN 1 TABLET: 5; 325 TABLET ORAL at 07:05

## 2025-05-10 RX ADMIN — IBUPROFEN 600 MG: 600 TABLET ORAL at 07:05

## 2025-05-10 RX ADMIN — IBUPROFEN 600 MG: 600 TABLET ORAL at 01:05

## 2025-05-10 NOTE — PROGRESS NOTES
Post  Delivery Progress Note:     Subjective  Rocky Burch is a 34 y.o.  s/p 1LTCS in setting PPROM with MCDA twins @ 33wga  on 2025, POD # 2. Doing well postoperatively.    The patient had no complains overnight and feels well this morning. Pain is well controlled on current meds.  Ambulating well around the halls and room. Patient in somewhat emotional over the progress her twins have made, but not tearful.  Flatus has passed. Tolerating a regular diet. Minimal lochia reports less than menses.    Denies fevers, chills, headache, blurry vision, nausea, vomiting, dizziness, or syncope.   Denies chest pain, shortness of breath, RUQ pain, or calf pain.     Objective:  Vital signs in last 24 hours:     Vitals:    25 2030 05/10/25 0010 05/10/25 0031   BP: 115/73  108/68    Pulse: 80  76    Resp:  18  18   Temp: 97.6 °F (36.4 °C)  97.8 °F (36.6 °C)    TempSrc: Oral  Oral    SpO2: 99%  99%      Physical Exam:  General: Alert and oriented, in no acute distress   Lungs: Clear to auscultation bilaterally   Heart:: Regular rate and rhythm, S1, S2 normal, no murmur   Abdomen Soft, non-distended   Bowel Sounds: Active   Uterine Fundus:  Firm, below the umbilicus   Incision: incision clean, dry, intact   Lochia  Less than menses and improving   DVT Evaluation: No cords or calf tenderness.  No significant calf/ankle edema.   Extremities: Normal, atraumatic, non-edematous     Labs  Trended Lab Data:  Recent Labs   Lab 25  0046 25  0514   WBC 12.14* 15.01*   HGB 12.2 11.0*   HCT 37.5 33.6*    119*   MCV 89.7 89.8   RDW 15.2 15.0       Medications      docusate sodium  200 mg Oral BID    lactated ringers  1,000 mL Intravenous Once    mupirocin   Nasal BID    prenatal vitamin  1 tablet Oral Daily    sodium citrate-citric acid 500-334 mg/5 ml  30 mL Oral Once         lactated ringers   Intravenous Continuous        oxytocin  95 don-units/min Intravenous Continuous PRN             Current Facility-Administered Medications:     bisacodyL, 10 mg, Rectal, Once PRN    carboprost, 250 mcg, Intramuscular, Q15 Min PRN    diphenhydrAMINE, 25 mg, Oral, Q4H PRN    diphenoxylate-atropine 2.5-0.025 mg, 2 tablet, Oral, Q6H PRN    famotidine (PF), 20 mg, Intravenous, On Call Procedure    ibuprofen, 600 mg, Oral, Q6H PRN    lactated ringers, 1,000 mL, Intravenous, PRN    lanolin, , Topical (Top), PRN    magnesium hydroxide 400 mg/5 ml, 30 mL, Oral, BID PRN    measles, mumps and rubella vaccine, 0.5 mL, Subcutaneous, vaccine x 1 dose    methylergonovine, 200 mcg, Intramuscular, On Call Procedure    methylergonovine, 200 mcg, Intramuscular, Once PRN    miSOPROStoL, 800 mcg, Rectal, On Call Procedure    miSOPROStoL, 800 mcg, Rectal, Once PRN    miSOPROStoL, 800 mcg, Oral, Once PRN    morphine, 2 mg, Intravenous, Q3H PRN    morphine, 4 mg, Intravenous, Q4H PRN    mupirocin, , Nasal, On Call Procedure    nalbuphine, 2.5 mg, Intravenous, Once PRN    naloxone, 0.04 mg, Intravenous, PRN    ondansetron, 8 mg, Oral, Q8H PRN    ondansetron, 4 mg, Intravenous, Q12H PRN    oxyCODONE-acetaminophen, 1 tablet, Oral, Q4H PRN    oxytocin, 95 don-units/min, Intravenous, Continuous PRN    oxytocin, 95 don-units/min, Intravenous, Once PRN    oxytocin, 10 Units, Intravenous, Once PRN    oxytocin, 10 Units, Intramuscular, Once PRN    prochlorperazine, 5 mg, Intravenous, Q6H PRN    senna-docusate, 1 tablet, Oral, Nightly PRN    simethicone, 1 tablet, Oral, Q6H PRN    sodium chloride 0.9%, 10 mL, Intravenous, PRN    sodium citrate-citric acid 500-334 mg/5 ml, 30 mL, Oral, On Call Procedure    DIPH,PERTUSS(ACELL),TET VACCINE (ADULT)(BOOSTRIX,ADACEL), 0.5 mL, Intramuscular, vaccine x 1 dose    tranexamic acid (CYKLOKAPRON) infusion, 1,000 mg, Intravenous, Q30 Min PRN    Assessment/Plan  Rocky Burch is a 34 y.o.  s/p 1LTCS in setting PPROM with MCDA twins on 2025, POD # 2. Clinically stable.    Doing well.  Continue routine postop care. Advance today.  Encourage ambulation.  Antepartum H/H 12.2/37.5 --> QBL 860mL --> postpartum H/H 11.6/36.8 .   Asymptomatic. Not tachycardic.  Continue to monitor for symptoms of anemia.  Rh negative, s/p rhogam   GDMA1, 2hr OGTT 6 weeks postpartum   Breastfeeding, continue to encourage.  Pain: Controlled with current pain regimen.  PPBCM: Undecided  Dispo: continue to monitor and anticipate discharge on POD 4 if meeting all discharge criteria.    Patient and plan discussed with staff.    Gabe Acuna DO  [facility] Resident, [facility]

## 2025-05-11 PROCEDURE — 25000003 PHARM REV CODE 250: Performed by: STUDENT IN AN ORGANIZED HEALTH CARE EDUCATION/TRAINING PROGRAM

## 2025-05-11 PROCEDURE — 25000003 PHARM REV CODE 250: Performed by: SPECIALIST

## 2025-05-11 PROCEDURE — 11000001 HC ACUTE MED/SURG PRIVATE ROOM

## 2025-05-11 RX ADMIN — OXYCODONE HYDROCHLORIDE AND ACETAMINOPHEN 1 TABLET: 5; 325 TABLET ORAL at 11:05

## 2025-05-11 RX ADMIN — IBUPROFEN 600 MG: 600 TABLET ORAL at 09:05

## 2025-05-11 RX ADMIN — IBUPROFEN 600 MG: 600 TABLET ORAL at 06:05

## 2025-05-11 RX ADMIN — OXYCODONE HYDROCHLORIDE AND ACETAMINOPHEN 1 TABLET: 5; 325 TABLET ORAL at 12:05

## 2025-05-11 RX ADMIN — SIMETHICONE 80 MG: 80 TABLET, CHEWABLE ORAL at 12:05

## 2025-05-11 RX ADMIN — IBUPROFEN 600 MG: 600 TABLET ORAL at 12:05

## 2025-05-11 RX ADMIN — SIMETHICONE 80 MG: 80 TABLET, CHEWABLE ORAL at 11:05

## 2025-05-11 RX ADMIN — OXYCODONE HYDROCHLORIDE AND ACETAMINOPHEN 1 TABLET: 5; 325 TABLET ORAL at 10:05

## 2025-05-11 RX ADMIN — OXYCODONE HYDROCHLORIDE AND ACETAMINOPHEN 1 TABLET: 5; 325 TABLET ORAL at 06:05

## 2025-05-11 RX ADMIN — DOCUSATE SODIUM 200 MG: 100 CAPSULE, LIQUID FILLED ORAL at 09:05

## 2025-05-11 RX ADMIN — DOCUSATE SODIUM 200 MG: 100 CAPSULE, LIQUID FILLED ORAL at 10:05

## 2025-05-11 NOTE — PROGRESS NOTES
Post  Delivery Progress Note:     Subjective  Rocky Burch is a 34 y.o.  s/p 1LTCS in setting PPROM with MCDA twins @ 33wga  on 2025, POD # 3. Doing well postoperatively.    The patient had no complains overnight and feels well this morning. Pain is well controlled on current meds.  Pt with complaint of swelling to bilateral lower legs. Vitals stable overnight.  Flatus has passed. Tolerating a regular diet. Minimal lochia reports less than menses.    Denies fevers, chills, headache, blurry vision, nausea, vomiting, dizziness, or syncope.   Denies chest pain, shortness of breath, RUQ pain, or calf pain.     Objective:  Vital signs in last 24 hours:     Vitals:    25 0025 25 0036 25 0601 25 0800   BP: 129/83   119/79   BP Location:    Right arm   Patient Position:    Sitting   Pulse: 85   82   Resp:  18 18    Temp: 97.7 °F (36.5 °C)   98.9 °F (37.2 °C)   TempSrc: Oral      SpO2: 97%   99%     Physical Exam:  General: Alert and oriented, in no acute distress   Lungs: Clear to auscultation bilaterally   Heart:: Regular rate and rhythm, S1, S2 normal, no murmur   Abdomen Soft, non-distended   Bowel Sounds: Active   Uterine Fundus:  Firm, below the umbilicus   Incision: incision clean, dry, intact   Lochia  Less than menses    DVT Evaluation: No cords or calf tenderness.  No significant calf/ankle edema.   Extremities: Normal, atraumatic, non-edematous     Labs  Trended Lab Data:  Recent Labs   Lab 25  0046 25  0514 05/10/25  0607   WBC 12.14* 15.01* 12.58*   HGB 12.2 11.0* 11.6*   HCT 37.5 33.6* 36.8*    119* 182   MCV 89.7 89.8 92.7   RDW 15.2 15.0 15.2       Medications      docusate sodium  200 mg Oral BID    lactated ringers  1,000 mL Intravenous Once    mupirocin   Nasal BID    prenatal vitamin  1 tablet Oral Daily    sodium citrate-citric acid 500-334 mg/5 ml  30 mL Oral Once         oxytocin  95 don-units/min Intravenous Continuous PRN             Current Facility-Administered Medications:     bisacodyL, 10 mg, Rectal, Once PRN    carboprost, 250 mcg, Intramuscular, Q15 Min PRN    diphenhydrAMINE, 25 mg, Oral, Q4H PRN    diphenoxylate-atropine 2.5-0.025 mg, 2 tablet, Oral, Q6H PRN    famotidine (PF), 20 mg, Intravenous, On Call Procedure    ibuprofen, 600 mg, Oral, Q6H PRN    lactated ringers, 1,000 mL, Intravenous, PRN    lanolin, , Topical (Top), PRN    magnesium hydroxide 400 mg/5 ml, 30 mL, Oral, BID PRN    measles, mumps and rubella vaccine, 0.5 mL, Subcutaneous, vaccine x 1 dose    methylergonovine, 200 mcg, Intramuscular, On Call Procedure    methylergonovine, 200 mcg, Intramuscular, Once PRN    miSOPROStoL, 800 mcg, Rectal, On Call Procedure    miSOPROStoL, 800 mcg, Rectal, Once PRN    miSOPROStoL, 800 mcg, Oral, Once PRN    morphine, 2 mg, Intravenous, Q3H PRN    morphine, 4 mg, Intravenous, Q4H PRN    mupirocin, , Nasal, On Call Procedure    nalbuphine, 2.5 mg, Intravenous, Once PRN    naloxone, 0.04 mg, Intravenous, PRN    ondansetron, 8 mg, Oral, Q8H PRN    ondansetron, 4 mg, Intravenous, Q12H PRN    oxyCODONE-acetaminophen, 1 tablet, Oral, Q4H PRN    oxytocin, 95 don-units/min, Intravenous, Continuous PRN    oxytocin, 95 don-units/min, Intravenous, Once PRN    oxytocin, 10 Units, Intravenous, Once PRN    oxytocin, 10 Units, Intramuscular, Once PRN    prochlorperazine, 5 mg, Intravenous, Q6H PRN    senna-docusate, 1 tablet, Oral, Nightly PRN    simethicone, 1 tablet, Oral, Q6H PRN    sodium chloride 0.9%, 10 mL, Intravenous, PRN    sodium citrate-citric acid 500-334 mg/5 ml, 30 mL, Oral, On Call Procedure    DIPH,PERTUSS(ACELL),TET VACCINE (ADULT)(BOOSTRIX,ADACEL), 0.5 mL, Intramuscular, vaccine x 1 dose    tranexamic acid (CYKLOKAPRON) infusion, 1,000 mg, Intravenous, Q30 Min PRN    Assessment/Plan  Rocky Burch is a 34 y.o.  s/p 1LTCS in setting PPROM with MCDA twins on 2025, POD #3. Clinically stable.    Doing well.  Continue routine postop care. Advance today.  Encourage ambulation.  Antepartum H/H 12.2/37.5 --> QBL 860mL --> postpartum H/H 11.6/36.8 .   Asymptomatic. Not tachycardic.  Continue to monitor for symptoms of anemia.  Rh negative, s/p rhogam   GDMA1, 2hr OGTT 6 weeks postpartum   Breastfeeding, continue to encourage.  Pain: Controlled with current pain regimen.  PPBCM: Undecided  Dispo: continue to monitor and anticipate discharge on POD 4    Will follow up with Primary OB dr. Gibson upon discharge.    Patient and plan discussed with staff.    Gabe Acuna DO  [facility] Resident, -II

## 2025-05-12 ENCOUNTER — TELEPHONE (OUTPATIENT)
Dept: OBSTETRICS AND GYNECOLOGY | Facility: CLINIC | Age: 35
End: 2025-05-12
Payer: COMMERCIAL

## 2025-05-12 VITALS
OXYGEN SATURATION: 99 % | RESPIRATION RATE: 18 BRPM | TEMPERATURE: 99 F | HEART RATE: 57 BPM | SYSTOLIC BLOOD PRESSURE: 127 MMHG | DIASTOLIC BLOOD PRESSURE: 73 MMHG

## 2025-05-12 PROBLEM — Z98.891 S/P CESAREAN SECTION: Status: ACTIVE | Noted: 2025-05-12

## 2025-05-12 PROBLEM — O42.919 PRETERM PREMATURE RUPTURE OF MEMBRANES (PPROM) WITH UNKNOWN ONSET OF LABOR: Status: ACTIVE | Noted: 2025-05-12

## 2025-05-12 LAB — PSYCHE PATHOLOGY RESULT: NORMAL

## 2025-05-12 PROCEDURE — 25000003 PHARM REV CODE 250: Performed by: SPECIALIST

## 2025-05-12 RX ORDER — OXYCODONE AND ACETAMINOPHEN 5; 325 MG/1; MG/1
1 TABLET ORAL EVERY 4 HOURS PRN
Qty: 20 TABLET | Refills: 0 | Status: SHIPPED | OUTPATIENT
Start: 2025-05-12

## 2025-05-12 RX ORDER — IBUPROFEN 600 MG/1
600 TABLET, FILM COATED ORAL EVERY 6 HOURS
Qty: 80 TABLET | Refills: 0 | Status: SHIPPED | OUTPATIENT
Start: 2025-05-12 | End: 2025-06-01

## 2025-05-12 RX ADMIN — IBUPROFEN 600 MG: 600 TABLET ORAL at 01:05

## 2025-05-12 RX ADMIN — IBUPROFEN 600 MG: 600 TABLET ORAL at 05:05

## 2025-05-12 RX ADMIN — DOCUSATE SODIUM 200 MG: 100 CAPSULE, LIQUID FILLED ORAL at 01:05

## 2025-05-12 NOTE — TELEPHONE ENCOUNTER
Returned pt. Call.  confirmed. States after appoint. 25 water broke at 11 pm and twins delivered 25 via . States all is going well,babies are in nicu but doing okay. Pt. will call office today after discharge for post-op appoint.

## 2025-05-12 NOTE — TELEPHONE ENCOUNTER
----- Message from Colleen sent at 5/12/2025  8:23 AM CDT -----  Regarding: Call back  Pt called asking to speak to you. Said she wanted to talk to you about something.

## 2025-05-12 NOTE — DISCHARGE SUMMARY
Delivery Discharge Summary  [facility]      Admission date: 2025  Discharge date: 2025    PCP: None    Admit Dx:   Patient Active Problem List   Diagnosis    - Monochorionic diamniotic twin gestation in third trimester    - Prior poor obstetrical history, antepartum    - Abnormal prenatal test    - Twin B: EIF    - Diet controlled gestational diabetes mellitus (GDM) in third trimester     premature rupture of membranes (PPROM) with unknown onset of labor    S/P  section      Discharge Dx:    Patient Active Problem List   Diagnosis    - Monochorionic diamniotic twin gestation in third trimester    - Prior poor obstetrical history, antepartum    - Abnormal prenatal test    - Twin B: EIF    - Diet controlled gestational diabetes mellitus (GDM) in third trimester     premature rupture of membranes (PPROM) with unknown onset of labor    S/P  section     Procedure: , due to  premature rupture of membranes    Hospital Course   Rocky Burch is a 34 y.o. now  female who was admitted on 2025 for delivery. She had the benefit of an epidural, and external fetal monitoring. Patient and infant tolerated labor well with pitocin. Patient delivered a viable  via CS at 33^3 WGA on 25. Apgars 8/9 for both babies. There was 860 mL blood loss. Please see delivery note for further details. Patient was in stable condition post delivery and was transferred to the Mother-Baby Unit. Her postpartum course was uncomplicated. On the date of discharge, patient's pain is controlled with oral pain medications. No fever or chills. She is ambulating without SOB or CP, and tolerating PO diet without N/V. Good urinary and bowel function. Reported lochia is within the normal range. Patient has had no complaints or questions that were not addressed during the duration of her stay. Patient in stable condition and ready for discharge on POD 4.     Physical Exam   "    Vitals:    25 2138 25 2354 25 0000 25 0733   BP: 131/76  125/74 127/73   BP Location:       Patient Position:       Pulse: 66  62 (!) 57   Resp: 18 18     Temp: 98.1 °F (36.7 °C)  97.5 °F (36.4 °C) 98.7 °F (37.1 °C)   TempSrc: Oral  Oral Oral   SpO2: 97%  97% 99%     Gen: AAO x 3, NAD  HEENT: NCAT, EOMI  CV: RRR, no murmurs; S1/S2  Resp: Clear to auscultation bilaterally with no wheezing, stridor, or upper airways sounds, good air movement, nontender chest  Abd: soft, +bowel sounds, incision C/D/I  : uterus firm below umbilicus  Ext: no edema, DP/Radial 2+     Pertinent Studies   Postpartum CBC  Lab Results   Component Value Date    WBC 12.58 (H) 05/10/2025    HGB 11.6 (L) 05/10/2025    HCT 36.8 (L) 05/10/2025    MCV 92.7 05/10/2025     05/10/2025        Marcin, JANKI [place] [[id number]]     Delivery:    Episiotomy:     Lacerations:     Repair suture:     Repair # of packets:     Blood loss (ml):       Birth information:  YOB: 2023   Time of birth: 1:43 AM   Sex: male   Delivery type: , Low Transverse   Gestational Age: 33w3d     Measurements    Weight: 2150 g  Weight (lbs): 4 lb 11.8 oz  Length: 44.5 cm  Length (in): 17.52"  Head circumference: 31.5 cm  Abdominal girth: 22.5 cm         Delivery Clinician: Delivery Providers    Delivering clinician: Gareth Steward MD   Provider Role    Nneka Wilkerson RN Registered Nurse    Honey Valente RN Registered Nurse             Additional  information:  Forceps:    Vacuum:    Breech:    Observed anomalies      Living?:     Apgars    Living status: Living  Apgar Component Scores:  1 min.:  5 min.:  10 min.:  15 min.:  20 min.:    Skin color:  0  1       Heart rate:  2  2       Reflex irritability:  2  2       Muscle tone:  2  2       Respiratory effort:  2  2       Total:  8  9                Placenta: Delivered:       appearance     GABY Burch [[id number]]     Delivery:    Episiotomy:     Lacerations:     Repair " suture:     Repair # of packets:     Blood loss (ml):       Birth information:  YOB: 2023   Time of birth: 1:45 AM   Sex: male   Delivery type: , Low Transverse   Gestational Age: 33w3d     Measurements    Weight: 2014 g  Weight (lbs): 4 lb 7 oz  Length:          Delivery Clinician: Delivery Providers    Delivering clinician: Gareth Steward MD   Provider Role    Nneka Wilkerson RN Registered Nurse    Honey Valente RN Registered Nurse             Additional  information:  Forceps:    Vacuum:    Breech:    Observed anomalies      Living?:     Apgars    Living status: Living  Apgar Component Scores:  1 min.:  5 min.:  10 min.:  15 min.:  20 min.:    Skin color:  0  1       Heart rate:  2  2       Reflex irritability:  2  2       Muscle tone:  2  2       Respiratory effort:  2  2       Total:  8  9                Placenta: Delivered:       appearance    Labs: No AM labs. Prenatal labs reviewed - Mother is Rh-positive, rubella immune, varicella immune. H&H: 11.6/36.8    Disposition   To home, self care    Follow Up    Primary OB Dr. Gibson in 1-2 weeks for incision check and again in 6 weeks    Patient Instructions     1. Report to OB ED or call clinic for any bleeding >2 pads/hour for >2 hours, temperature >100.4, foul smelling discharge, pain uncontrolled with medications, incision with warmth or drainage,or or for any other concerns.  2. Pelvic rest x6 weeks and no tub baths x 2 weeks  3. Rx for Percocet, Motrin and Colace provided. No driving while on narcotics.  4. Post partum contraception: Undecided     Current Discharge Medication List        START taking these medications    Details   ibuprofen (ADVIL,MOTRIN) 600 MG tablet Take 1 tablet (600 mg total) by mouth every 6 (six) hours. for 20 days  Qty: 80 tablet, Refills: 0      oxyCODONE-acetaminophen (PERCOCET) 5-325 mg per tablet Take 1 tablet by mouth every 4 (four) hours as needed for Pain.  Qty: 20 tablet, Refills: 0    Comments:  Quantity prescribed more than 7 day supply? No  Associated Diagnoses: S/P  section           CONTINUE these medications which have NOT CHANGED    Details   busPIRone (BUSPAR) 5 MG Tab Take 5 mg by mouth 2 (two) times daily as needed.      calcium carbonate (OS-DEMETRI) 500 mg calcium (1,250 mg) tablet Take 2 tablets (1,000 mg total) by mouth 2 (two) times daily.  Qty: 60 tablet, Refills: 5    Associated Diagnoses: Monochorionic diamniotic twin gestation in first trimester      docusate sodium (COLACE) 100 MG capsule Take 100 mg by mouth 2 (two) times daily.      ferrous sulfate 324 mg (65 mg iron) TbEC Take 325 mg by mouth once daily.      lancets 28 gauge Misc USE TO CHECK BLOOD SUGAR 5 TIMES A DAY AS DIRECTED BY THE PROVIDER      prenatal vit no.124/iron/folic (PRENATAL VITAMIN ORAL) Take 1 tablet by mouth once daily.      TRUE METRIX AIR GLUCOSE METER Misc 5 (five) times daily.      TRUE METRIX GLUCOSE TEST STRIP Strp 5 (five) times daily.      ZINC ORAL Take by mouth.             Time spent on discharge: 60 min     Jesi Lopez DO  [facility]

## 2025-05-14 NOTE — PROGRESS NOTES
Subjective:      Rocky Burch is a 34 y.o.  who presents to the clinic 2 weeks status post CSECTION. The patient is not having any pain. Post-op Evaluation  Twins are doing well. Patient is having normal bowel and bladder function.     Past Medical History:   Diagnosis Date    Anxiety disorder, unspecified     Constipation     Fetal demise, greater than 22 weeks, antepartum, fetus 1 2023    Maternal care for (suspected) chromosomal abnormality in fetus, trisomy 18, not applicable or unspecified 2023    Molar pregnancy     Rh negative state in antepartum period, third trimester 2023    Stillborn, abnormal      Past Surgical History:   Procedure Laterality Date     SECTION N/A 2025    Procedure:  SECTION;  Surgeon: Gareth Steward MD;  Location: [facility];  Service: OB/GYN;  Laterality: N/A;    DILATION AND CURETTAGE OF UTERUS USING SUCTION N/A 2023    Procedure: DILATION AND CURETTAGE, UTERUS, USING SUCTION;  Surgeon: Fidel Gibson MD;  Location: [facility] OR;  Service: OB/GYN;  Laterality: N/A;    WISDOM TOOTH EXTRACTION       Review of patient's allergies indicates:  No Known Allergies  OB History    Para Term  AB Living   3 2  2 1 2   SAB IAB Ectopic Multiple Live Births   1 0  1 2      # Outcome Date GA Lbr José Miguel/2nd Weight Sex Type Anes PTL Lv   3A  25 33w3d  2.15 kg (4 lb 11.8 oz) M CS-LTranv Spinal  REYNALDO   3B  25 33w3d  2.014 kg (4 lb 7 oz) M CS-LTranv Spinal  REYNALDO   2 SAB 23     SAB         Birth Comments: molar pregnancy   1  23 33w2d 02:25 / 00:18 0.959 kg (2 lb 1.8 oz) F Vag-Spont None  FD      Complications: Trisomy 13, unspecified     Social History[1]  Family History   Problem Relation Name Age of Onset    Depression Father      Hypertension Father      Gestational diabetes Mother       Current Medications[2]    A comprehensive review of symptoms was completed and negative except as noted  above.  Objective:   /70 (BP Location: Left arm, Patient Position: Sitting)   Pulse 92   Ht 5' (1.524 m)   Wt 65.9 kg (145 lb 3.2 oz)   LMP 09/16/2024 (Exact Date)   Breastfeeding Yes   BMI 28.36 kg/m²   General Appearance: alert, in no acute distress, normal, well nourished.  Breast:  Right: Inspection/palpation: no discharge, no masses present, no nipple retraction, no skin changes, no skin dimpling, no tenderness, no lymphadenopathy, no axillary mass, no axillary tenderness.  Left: Inspection/palpation: no discharge, no masses present, no nipple retraction, no skin changes, no skin dimpling, no tenderness, no lymphadenopathy, no axillary mass, no axillary tenderness.  Gastrointestinal:  Abdomen: no masses. no tender, nondistended.  Liver and spleen: normal  Hernias: no hernias present, no inguinal adenopathy.  Wound Site: clean, dry and intact without erythema or induration. Incision healing well.   Assessment:   Postop check       No results found for this or any previous visit (from the past 24 hours).    Plan:   Continue any current medications.  Wound care discussed.  Follow up in about 2 weeks (around 6/5/2025) for POST PARTUM . In addition to her scheduled follow up, the pt has also been instructed to follow up on as needed basis.     This note was transcribed by Margie Slater CMA. There may be transcription errors as a result, however minimal. Effort has been made to ensure accuracy of transcription, but any obvious errors or omissions should be clarified with the author of the document.       I agree with the above documentation.            [1]   Social History  Tobacco Use    Smoking status: Never     Passive exposure: Never    Smokeless tobacco: Never   Substance Use Topics    Alcohol use: Not Currently     Comment: social    Drug use: Never   [2]   Current Outpatient Medications:     docusate sodium (COLACE) 100 MG capsule, Take 100 mg by mouth 2 (two) times daily., Disp: , Rfl:      prenatal vit no.124/iron/folic (PRENATAL VITAMIN ORAL), Take 1 tablet by mouth once daily., Disp: , Rfl:

## 2025-05-15 ENCOUNTER — LACTATION ENCOUNTER (OUTPATIENT)
Dept: INTENSIVE CARE | Facility: HOSPITAL | Age: 35
End: 2025-05-15

## 2025-05-15 NOTE — LACTATION NOTE
This note was copied from a baby's chart.  Visited with Rocky regarding her diet.She stated has been eating a lot of dairy, drinking more caffeine, and has resumed taking Buspar regularly. Encouraged to limit dairy, caffeine, spicy& gassy foods. Consider taking less of Buspar as it can cause feeding problems in infants per [place] recommendations. Notified MD of conversation.

## 2025-05-22 ENCOUNTER — OFFICE VISIT (OUTPATIENT)
Dept: OBSTETRICS AND GYNECOLOGY | Facility: CLINIC | Age: 35
End: 2025-05-22
Payer: COMMERCIAL

## 2025-05-22 VITALS
WEIGHT: 145.19 LBS | SYSTOLIC BLOOD PRESSURE: 100 MMHG | HEIGHT: 60 IN | BODY MASS INDEX: 28.51 KG/M2 | DIASTOLIC BLOOD PRESSURE: 70 MMHG | HEART RATE: 92 BPM

## 2025-05-22 DIAGNOSIS — Z09 POSTOP CHECK: Primary | ICD-10-CM

## 2025-05-22 PROCEDURE — 3078F DIAST BP <80 MM HG: CPT | Mod: CPTII,,, | Performed by: OBSTETRICS & GYNECOLOGY

## 2025-05-22 PROCEDURE — 3074F SYST BP LT 130 MM HG: CPT | Mod: CPTII,,, | Performed by: OBSTETRICS & GYNECOLOGY

## 2025-05-22 PROCEDURE — 3008F BODY MASS INDEX DOCD: CPT | Mod: CPTII,,, | Performed by: OBSTETRICS & GYNECOLOGY

## 2025-05-22 PROCEDURE — 99024 POSTOP FOLLOW-UP VISIT: CPT | Mod: ,,, | Performed by: OBSTETRICS & GYNECOLOGY

## 2025-06-05 NOTE — PROGRESS NOTES
Subjective:      Rocky Burch is a 34 y.o.  who presents for a postpartum visit.    She is status post  for twins 5 weeks ago.    Her hospitalization was not complicated.    She is breastfeeding.    She desires no method for contraception.  But is considering IUD.  She denies signs and symptoms of postpartum depression.   Her last pap was on 2024.   NO C/O'S.    Past Medical History:   Diagnosis Date    Anxiety disorder, unspecified     Constipation     Fetal demise, greater than 22 weeks, antepartum, fetus 1 2023    Maternal care for (suspected) chromosomal abnormality in fetus, trisomy 18, not applicable or unspecified 2023    Molar pregnancy     Rh negative state in antepartum period, third trimester 2023    Stillborn, abnormal      Past Surgical History:   Procedure Laterality Date     SECTION N/A 2025    Procedure:  SECTION;  Surgeon: Gareth Steward MD;  Location: [facility];  Service: OB/GYN;  Laterality: N/A;    DILATION AND CURETTAGE OF UTERUS USING SUCTION N/A 2023    Procedure: DILATION AND CURETTAGE, UTERUS, USING SUCTION;  Surgeon: Fidel Gibson MD;  Location: [facility];  Service: OB/GYN;  Laterality: N/A;    WISDOM TOOTH EXTRACTION       Social History[1]  Family History   Problem Relation Name Age of Onset    Depression Father      Hypertension Father      Gestational diabetes Mother       OB History    Para Term  AB Living   3 2  2 1 2   SAB IAB Ectopic Multiple Live Births   1 0  1 2      # Outcome Date GA Lbr José Miguel/2nd Weight Sex Type Anes PTL Lv   3A  25 33w3d  2.15 kg (4 lb 11.8 oz) M CS-LTranv Spinal  REYNALDO   3B  25 33w3d  2.014 kg (4 lb 7 oz) M CS-LTranv Spinal  REYNALDO   2 SAB 23     SAB         Birth Comments: molar pregnancy   1  23 33w2d 02:25 / 00:18 0.959 kg (2 lb 1.8 oz) F Vag-Spont None  FD      Complications: Trisomy 13, unspecified     Current Medications[2]   Review  the Delivery Report for details.     ROS:   Review of Systems  A comprehensive review of symptoms was completed and negative except as noted above.    Objective:     /68   Pulse 82   Wt 62.6 kg (138 lb)   Breastfeeding Yes   BMI 26.95 kg/m²   Constitutional:  General Appearance : alert, in no acute distress, normal, well nourished.  Breast:  Right: Inspection/palpation: no discharge, no masses present, no nipple retraction, no skin changes, no skin dimpling, no tenderness, no lymphadenopathy, no axillary mass, no axillary tenderness.  Left: Inspection/palpation: no discharge, no masses present, no nipple retraction, no skin changes, no skin dimpling, no tenderness, no lymphadenopathy, no axillary mass, no axillary tenderness.  Abdomen: incision C/D/I, healing well.  Genitourinary:  External Genitalia: normal, no lesions.  Vagina: normal appearance, no abnormal discharge, no lesions.  Bladder: no mass, nontender.  Urethra: no erythema or lesions present.  Cervix: no lesions, non tender.   Uterus: nontender, normal contour, normal mobility, normal size.   Adnexa: no masses, no tenderness.  Anus and Perineum: visually normal.     Chaperone Present  Assessment:     Postpartum exam    Screening for STD (sexually transmitted disease)  -     MDL Sendout Test      Plan:     Follow up in about 5 months (around 11/20/2025) for ANNUAL. In addition to their scheduled follow up, the patient has also been instructed to follow up on as needed basis.     This note was transcribed by Margie Slater CMA. There may be transcription errors as a result, however minimal. Effort has been made to ensure accuracy of transcription, but any obvious errors or omissions should be clarified with the author of the document.     I agree with the above documentation.            [1]   Social History  Tobacco Use    Smoking status: Never     Passive exposure: Never    Smokeless tobacco: Never   Substance Use Topics    Alcohol use: Not  Currently     Comment: social    Drug use: Never   [2]   Current Outpatient Medications:     docusate sodium (COLACE) 100 MG capsule, Take 100 mg by mouth 2 (two) times daily., Disp: , Rfl:     prenatal vit no.124/iron/folic (PRENATAL VITAMIN ORAL), Take 1 tablet by mouth once daily., Disp: , Rfl:

## 2025-06-11 ENCOUNTER — POSTPARTUM VISIT (OUTPATIENT)
Dept: OBSTETRICS AND GYNECOLOGY | Facility: CLINIC | Age: 35
End: 2025-06-11
Payer: COMMERCIAL

## 2025-06-11 VITALS
DIASTOLIC BLOOD PRESSURE: 68 MMHG | HEART RATE: 82 BPM | WEIGHT: 138 LBS | BODY MASS INDEX: 26.95 KG/M2 | SYSTOLIC BLOOD PRESSURE: 110 MMHG

## 2025-06-11 DIAGNOSIS — Z11.3 SCREENING FOR STD (SEXUALLY TRANSMITTED DISEASE): ICD-10-CM

## 2025-06-18 ENCOUNTER — RESULTS FOLLOW-UP (OUTPATIENT)
Dept: OBSTETRICS AND GYNECOLOGY | Facility: CLINIC | Age: 35
End: 2025-06-18

## 2025-07-16 ENCOUNTER — PROCEDURE VISIT (OUTPATIENT)
Dept: OBSTETRICS AND GYNECOLOGY | Facility: CLINIC | Age: 35
End: 2025-07-16
Payer: COMMERCIAL

## 2025-07-16 VITALS
HEART RATE: 76 BPM | DIASTOLIC BLOOD PRESSURE: 80 MMHG | BODY MASS INDEX: 26.62 KG/M2 | WEIGHT: 136.31 LBS | SYSTOLIC BLOOD PRESSURE: 104 MMHG

## 2025-07-16 DIAGNOSIS — Z30.430 ENCOUNTER FOR IUD INSERTION: ICD-10-CM

## 2025-07-16 LAB
B-HCG UR QL: NEGATIVE
CTP QC/QA: YES

## 2025-07-16 PROCEDURE — 58300 INSERT INTRAUTERINE DEVICE: CPT | Mod: ,,, | Performed by: OBSTETRICS & GYNECOLOGY

## 2025-07-16 PROCEDURE — 99499 UNLISTED E&M SERVICE: CPT | Mod: ,,, | Performed by: OBSTETRICS & GYNECOLOGY

## 2025-07-16 PROCEDURE — 81025 URINE PREGNANCY TEST: CPT | Mod: ,,, | Performed by: OBSTETRICS & GYNECOLOGY

## 2025-07-16 RX ORDER — BUPROPION HYDROCHLORIDE 150 MG/1
150 TABLET ORAL EVERY MORNING
Qty: 30 TABLET | Refills: 3 | Status: SHIPPED | OUTPATIENT
Start: 2025-07-16

## 2025-07-16 NOTE — PROCEDURES
Insertion of IUD    Date/Time: 7/16/2025 8:30 AM    Performed by: Fidel Gibson MD  Authorized by: Fidel Gibson MD    Consent:     Consent obtained:  Prior to procedure the appropriate consent was completed and verified    Consent given by:  Patient    Procedure risks and benefits discussed: yes      Patient questions answered: yes      Patient agrees, verbalizes understanding, and wants to proceed: yes     Device to be inserted was verified by patient: yes  Insertion Procedure:   1 Intra Uterine Device levonorgestreL 52 mg       Pelvic exam performed: yes      Negative GC/chlamydia test: yes      Negative urine pregnancy test: yes      Cervix cleaned and prepped: yes      Speculum placed in vagina: yes      Tenaculum applied to cervix: yes      Uterus sounded: yes      Uterus sound depth (cm):  8    IUD inserted with no complications: yes      IUD type:  Mirena    Strings trimmed: yes    Post-procedure:     Patient tolerated procedure well: yes      Patient will follow up after next period: yes

## 2025-08-19 ENCOUNTER — OFFICE VISIT (OUTPATIENT)
Dept: OBSTETRICS AND GYNECOLOGY | Facility: CLINIC | Age: 35
End: 2025-08-19
Payer: COMMERCIAL

## 2025-08-19 VITALS
BODY MASS INDEX: 26.56 KG/M2 | DIASTOLIC BLOOD PRESSURE: 60 MMHG | SYSTOLIC BLOOD PRESSURE: 112 MMHG | HEART RATE: 76 BPM | WEIGHT: 136 LBS

## 2025-08-19 PROCEDURE — 3078F DIAST BP <80 MM HG: CPT | Mod: CPTII,,, | Performed by: OBSTETRICS & GYNECOLOGY

## 2025-08-19 PROCEDURE — 1160F RVW MEDS BY RX/DR IN RCRD: CPT | Mod: CPTII,,, | Performed by: OBSTETRICS & GYNECOLOGY

## 2025-08-19 PROCEDURE — 3008F BODY MASS INDEX DOCD: CPT | Mod: CPTII,,, | Performed by: OBSTETRICS & GYNECOLOGY

## 2025-08-19 PROCEDURE — 99213 OFFICE O/P EST LOW 20 MIN: CPT | Mod: ,,, | Performed by: OBSTETRICS & GYNECOLOGY

## 2025-08-19 PROCEDURE — 1159F MED LIST DOCD IN RCRD: CPT | Mod: CPTII,,, | Performed by: OBSTETRICS & GYNECOLOGY

## 2025-08-19 PROCEDURE — 3074F SYST BP LT 130 MM HG: CPT | Mod: CPTII,,, | Performed by: OBSTETRICS & GYNECOLOGY

## (undated) DEVICE — SUT 3/0 36IN COATED VICRYL

## (undated) DEVICE — SUT 0 36IN PDS II VIO MONO

## (undated) DEVICE — SEE MEDLINE ITEM 156931

## (undated) DEVICE — SOL WATER STRL IRR 1000ML

## (undated) DEVICE — DRESSING TELFA N ADH 3X8

## (undated) DEVICE — SUT CHROMIC GUT 2-0 CT-1 27IN

## (undated) DEVICE — DRAPE UND BUTT W/POUCH 40X44IN

## (undated) DEVICE — DRAPE HALF SURGICAL 40X58IN

## (undated) DEVICE — GLOVE 8 PROTEXIS PI BLUE

## (undated) DEVICE — CAP BABY BEANIE

## (undated) DEVICE — CATH URETHRAL 16FR RED

## (undated) DEVICE — SUT CTD VICRYL 0 UND BR CT

## (undated) DEVICE — PAD SANITARY OB STERILE

## (undated) DEVICE — TRAY CATH 1-LYR URIMTR 16FR

## (undated) DEVICE — SOL NACL IRR 1000ML BTL

## (undated) DEVICE — SEE MEDLINE ITEM 157117

## (undated) DEVICE — BINDER ABDOM 4PANEL 12IN LG/XL

## (undated) DEVICE — SCRUB HIBICLENS 4% CHG 4OZ

## (undated) DEVICE — CURETTE UTERINE BERKELEY 7MM

## (undated) DEVICE — GOWN SMARTGOWN 3XL XLONG

## (undated) DEVICE — KIT MAJOR SINGLE BASIN

## (undated) DEVICE — SUT 2-0 VICRYL / CT-1

## (undated) DEVICE — BULB SYRINGE EAR IRRIGATION

## (undated) DEVICE — JELLY SURGILUBE LUBE PKT 3GM

## (undated) DEVICE — TRAY DRY SKIN SCRUB PREP

## (undated) DEVICE — PAD CURITY MATERNITY PERI

## (undated) DEVICE — PAD UNDERPAD 30X30

## (undated) DEVICE — ELECTRODE REM PLYHSV RETURN 9

## (undated) DEVICE — PACK BASIC